# Patient Record
Sex: FEMALE | Race: WHITE | Employment: OTHER | URBAN - METROPOLITAN AREA
[De-identification: names, ages, dates, MRNs, and addresses within clinical notes are randomized per-mention and may not be internally consistent; named-entity substitution may affect disease eponyms.]

---

## 2017-05-23 ENCOUNTER — LAB REQUISITION (OUTPATIENT)
Dept: LAB | Facility: HOSPITAL | Age: 72
End: 2017-05-23
Payer: MEDICARE

## 2017-05-23 DIAGNOSIS — E78.00 PURE HYPERCHOLESTEROLEMIA: ICD-10-CM

## 2017-05-23 DIAGNOSIS — D51.8 OTHER VITAMIN B12 DEFICIENCY ANEMIAS: ICD-10-CM

## 2017-05-23 DIAGNOSIS — E55.9 VITAMIN D DEFICIENCY: ICD-10-CM

## 2017-05-23 DIAGNOSIS — R73.01 IMPAIRED FASTING GLUCOSE: ICD-10-CM

## 2017-05-23 PROCEDURE — 82306 VITAMIN D 25 HYDROXY: CPT | Performed by: INTERNAL MEDICINE

## 2017-05-23 PROCEDURE — 83036 HEMOGLOBIN GLYCOSYLATED A1C: CPT | Performed by: INTERNAL MEDICINE

## 2017-05-23 PROCEDURE — 85025 COMPLETE CBC W/AUTO DIFF WBC: CPT | Performed by: INTERNAL MEDICINE

## 2017-05-23 PROCEDURE — 80061 LIPID PANEL: CPT | Performed by: INTERNAL MEDICINE

## 2017-05-23 PROCEDURE — 84443 ASSAY THYROID STIM HORMONE: CPT | Performed by: INTERNAL MEDICINE

## 2017-05-23 PROCEDURE — 80053 COMPREHEN METABOLIC PANEL: CPT | Performed by: INTERNAL MEDICINE

## 2017-05-23 PROCEDURE — 82607 VITAMIN B-12: CPT | Performed by: INTERNAL MEDICINE

## 2017-05-30 ENCOUNTER — HOSPITAL ENCOUNTER (OUTPATIENT)
Dept: BONE DENSITY | Facility: HOSPITAL | Age: 72
Discharge: HOME | End: 2017-05-30
Attending: INTERNAL MEDICINE
Payer: MEDICARE

## 2017-05-30 DIAGNOSIS — M85.80 OSTEOPENIA: ICD-10-CM

## 2017-05-30 DIAGNOSIS — C50.411 BREAST CANCER OF UPPER-OUTER QUADRANT OF RIGHT FEMALE BREAST (HCC): ICD-10-CM

## 2017-05-30 DIAGNOSIS — Z78.0 ASYMPTOMATIC POSTMENOPAUSAL STATUS: ICD-10-CM

## 2017-05-30 PROCEDURE — 77080 DXA BONE DENSITY AXIAL: CPT | Performed by: INTERNAL MEDICINE

## 2017-06-16 ENCOUNTER — TELEPHONE (OUTPATIENT)
Dept: HEMATOLOGY/ONCOLOGY | Facility: HOSPITAL | Age: 72
End: 2017-06-16

## 2017-06-16 DIAGNOSIS — C50.411 BREAST CANCER OF UPPER-OUTER QUADRANT OF RIGHT FEMALE BREAST (HCC): Primary | ICD-10-CM

## 2017-06-16 NOTE — TELEPHONE ENCOUNTER
Patient needs order for bras faxed to [facility]  Fx# [phone].      It should say  :  prosthetic bras  QTY  6

## 2017-08-30 ENCOUNTER — TELEPHONE (OUTPATIENT)
Dept: HEMATOLOGY/ONCOLOGY | Facility: HOSPITAL | Age: 72
End: 2017-08-30

## 2017-08-30 DIAGNOSIS — C50.411 BREAST CANCER OF UPPER-OUTER QUADRANT OF RIGHT FEMALE BREAST (HCC): Primary | ICD-10-CM

## 2017-08-30 NOTE — TELEPHONE ENCOUNTER
Alberto Bains is asking for a mammogram order to be placed. She will have it in October but she weston like to schedule now.  Thanks

## 2017-09-06 ENCOUNTER — TELEPHONE (OUTPATIENT)
Dept: NEUROLOGY | Facility: CLINIC | Age: 72
End: 2017-09-06

## 2017-09-08 ENCOUNTER — OFFICE VISIT (OUTPATIENT)
Dept: NEUROLOGY | Facility: CLINIC | Age: 72
End: 2017-09-08

## 2017-09-08 DIAGNOSIS — G56.02 CARPAL TUNNEL SYNDROME OF LEFT WRIST: Primary | ICD-10-CM

## 2017-09-08 PROCEDURE — 95886 MUSC TEST DONE W/N TEST COMP: CPT | Performed by: OTHER

## 2017-09-08 PROCEDURE — 95910 NRV CNDJ TEST 7-8 STUDIES: CPT | Performed by: OTHER

## 2017-09-08 NOTE — PROCEDURES
[address]  Phone: [phone]  Fax: [phone] REPORT          Patient: Julissa Alatorre YOB: 1945  Patient ID: [id number] Hand Dominance: EMG–nerve conduction study reviewed marked prolongation of median motor distal latency absent median palmar sensory, absent median antidromic sensory responses and moderate chronic neurogenic motor unit potential changes in left APB muscle.   This is consis

## 2017-09-17 NOTE — H&P
[facility]    PATIENT'S NAME: Alycia Fine   ATTENDING PHYSICIAN: Mark Johnson MD   PATIENT ACCOUNT#:   [id number]    [facility]  MEDICAL RECORD #:   [id number]       YOB: 1945  ADMISSION DATE:       09/18/2017 endoscopies. MEDICATIONS:  Current home medications:  Aspirin 325 mg daily, Singulair 10 mg daily, Aleve 220 mg p.r.n., omeprazole 40 mg daily, Symbicort 160/4.5 inhaled b.i.d., Luvena prebiotic lubricant, VESIcare 5 mg daily.   Supplements:  Vitamin B12 Mark Johnson MD  d: 09/17/2017 11:50:07  t: 09/17/2017 13:36:02  [facility] [id number]  RICHI/

## 2017-09-18 ENCOUNTER — ANESTHESIA (OUTPATIENT)
Dept: SURGERY | Facility: HOSPITAL | Age: 72
End: 2017-09-18
Payer: MEDICARE

## 2017-09-18 ENCOUNTER — ANESTHESIA EVENT (OUTPATIENT)
Dept: SURGERY | Facility: HOSPITAL | Age: 72
End: 2017-09-18
Payer: MEDICARE

## 2017-09-18 ENCOUNTER — HOSPITAL ENCOUNTER (OUTPATIENT)
Facility: HOSPITAL | Age: 72
Setting detail: HOSPITAL OUTPATIENT SURGERY
Discharge: HOME | End: 2017-09-18
Attending: ORTHOPAEDIC SURGERY | Admitting: ORTHOPAEDIC SURGERY
Payer: MEDICARE

## 2017-09-18 ENCOUNTER — SURGERY (OUTPATIENT)
Age: 72
End: 2017-09-18

## 2017-09-18 VITALS
OXYGEN SATURATION: 93 % | BODY MASS INDEX: 44.9 KG/M2 | HEIGHT: 62 IN | TEMPERATURE: 97 F | SYSTOLIC BLOOD PRESSURE: 148 MMHG | DIASTOLIC BLOOD PRESSURE: 67 MMHG | RESPIRATION RATE: 18 BRPM | HEART RATE: 86 BPM | WEIGHT: 244 LBS

## 2017-09-18 DIAGNOSIS — G56.02 LEFT CARPAL TUNNEL SYNDROME: Primary | ICD-10-CM

## 2017-09-18 PROCEDURE — 01N50ZZ RELEASE MEDIAN NERVE, OPEN APPROACH: ICD-10-PCS | Performed by: ORTHOPAEDIC SURGERY

## 2017-09-18 RX ORDER — ONDANSETRON 2 MG/ML
INJECTION INTRAMUSCULAR; INTRAVENOUS AS NEEDED
Status: DISCONTINUED | OUTPATIENT
Start: 2017-09-18 | End: 2017-09-18 | Stop reason: SURG

## 2017-09-18 RX ORDER — MORPHINE SULFATE 2 MG/ML
2 INJECTION, SOLUTION INTRAMUSCULAR; INTRAVENOUS EVERY 10 MIN PRN
Status: DISCONTINUED | OUTPATIENT
Start: 2017-09-18 | End: 2017-09-18

## 2017-09-18 RX ORDER — SODIUM CHLORIDE, SODIUM LACTATE, POTASSIUM CHLORIDE, CALCIUM CHLORIDE 600; 310; 30; 20 MG/100ML; MG/100ML; MG/100ML; MG/100ML
INJECTION, SOLUTION INTRAVENOUS CONTINUOUS
Status: DISCONTINUED | OUTPATIENT
Start: 2017-09-18 | End: 2017-09-18

## 2017-09-18 RX ORDER — NALOXONE HYDROCHLORIDE 0.4 MG/ML
80 INJECTION, SOLUTION INTRAMUSCULAR; INTRAVENOUS; SUBCUTANEOUS AS NEEDED
Status: DISCONTINUED | OUTPATIENT
Start: 2017-09-18 | End: 2017-09-18

## 2017-09-18 RX ORDER — METOCLOPRAMIDE 10 MG/1
10 TABLET ORAL ONCE
Status: COMPLETED | OUTPATIENT
Start: 2017-09-18 | End: 2017-09-18

## 2017-09-18 RX ORDER — GLYCOPYRROLATE 0.2 MG/ML
INJECTION INTRAMUSCULAR; INTRAVENOUS AS NEEDED
Status: DISCONTINUED | OUTPATIENT
Start: 2017-09-18 | End: 2017-09-18 | Stop reason: SURG

## 2017-09-18 RX ORDER — HYDROMORPHONE HYDROCHLORIDE 1 MG/ML
0.6 INJECTION, SOLUTION INTRAMUSCULAR; INTRAVENOUS; SUBCUTANEOUS EVERY 5 MIN PRN
Status: DISCONTINUED | OUTPATIENT
Start: 2017-09-18 | End: 2017-09-18

## 2017-09-18 RX ORDER — HALOPERIDOL 5 MG/ML
0.25 INJECTION INTRAMUSCULAR ONCE AS NEEDED
Status: DISCONTINUED | OUTPATIENT
Start: 2017-09-18 | End: 2017-09-18

## 2017-09-18 RX ORDER — HYDROMORPHONE HYDROCHLORIDE 1 MG/ML
0.4 INJECTION, SOLUTION INTRAMUSCULAR; INTRAVENOUS; SUBCUTANEOUS EVERY 5 MIN PRN
Status: DISCONTINUED | OUTPATIENT
Start: 2017-09-18 | End: 2017-09-18

## 2017-09-18 RX ORDER — HYDROCODONE BITARTRATE AND ACETAMINOPHEN 5; 325 MG/1; MG/1
2 TABLET ORAL AS NEEDED
Status: DISCONTINUED | OUTPATIENT
Start: 2017-09-18 | End: 2017-09-18

## 2017-09-18 RX ORDER — HYDROMORPHONE HYDROCHLORIDE 1 MG/ML
0.2 INJECTION, SOLUTION INTRAMUSCULAR; INTRAVENOUS; SUBCUTANEOUS EVERY 5 MIN PRN
Status: DISCONTINUED | OUTPATIENT
Start: 2017-09-18 | End: 2017-09-18

## 2017-09-18 RX ORDER — MORPHINE SULFATE 10 MG/ML
6 INJECTION, SOLUTION INTRAMUSCULAR; INTRAVENOUS EVERY 10 MIN PRN
Status: DISCONTINUED | OUTPATIENT
Start: 2017-09-18 | End: 2017-09-18

## 2017-09-18 RX ORDER — MORPHINE SULFATE 4 MG/ML
4 INJECTION, SOLUTION INTRAMUSCULAR; INTRAVENOUS EVERY 10 MIN PRN
Status: DISCONTINUED | OUTPATIENT
Start: 2017-09-18 | End: 2017-09-18

## 2017-09-18 RX ORDER — DEXAMETHASONE SODIUM PHOSPHATE 4 MG/ML
VIAL (ML) INJECTION AS NEEDED
Status: DISCONTINUED | OUTPATIENT
Start: 2017-09-18 | End: 2017-09-18 | Stop reason: SURG

## 2017-09-18 RX ORDER — KETOROLAC TROMETHAMINE 30 MG/ML
INJECTION, SOLUTION INTRAMUSCULAR; INTRAVENOUS AS NEEDED
Status: DISCONTINUED | OUTPATIENT
Start: 2017-09-18 | End: 2017-09-18 | Stop reason: SURG

## 2017-09-18 RX ORDER — HYDROCODONE BITARTRATE AND ACETAMINOPHEN 5; 325 MG/1; MG/1
1 TABLET ORAL AS NEEDED
Status: DISCONTINUED | OUTPATIENT
Start: 2017-09-18 | End: 2017-09-18

## 2017-09-18 RX ORDER — LIDOCAINE HYDROCHLORIDE 5 MG/ML
INJECTION, SOLUTION INFILTRATION; INTRAVENOUS AS NEEDED
Status: DISCONTINUED | OUTPATIENT
Start: 2017-09-18 | End: 2017-09-18 | Stop reason: SURG

## 2017-09-18 RX ORDER — CHOLECALCIFEROL (VITAMIN D3) 125 MCG
500 CAPSULE ORAL DAILY
Status: CANCELLED | OUTPATIENT
Start: 2017-09-18

## 2017-09-18 RX ORDER — LIDOCAINE HYDROCHLORIDE 10 MG/ML
INJECTION, SOLUTION EPIDURAL; INFILTRATION; INTRACAUDAL; PERINEURAL AS NEEDED
Status: DISCONTINUED | OUTPATIENT
Start: 2017-09-18 | End: 2017-09-18 | Stop reason: SURG

## 2017-09-18 RX ORDER — MIDAZOLAM HYDROCHLORIDE 1 MG/ML
INJECTION INTRAMUSCULAR; INTRAVENOUS AS NEEDED
Status: DISCONTINUED | OUTPATIENT
Start: 2017-09-18 | End: 2017-09-18 | Stop reason: SURG

## 2017-09-18 RX ORDER — ONDANSETRON 2 MG/ML
4 INJECTION INTRAMUSCULAR; INTRAVENOUS ONCE AS NEEDED
Status: DISCONTINUED | OUTPATIENT
Start: 2017-09-18 | End: 2017-09-18

## 2017-09-18 RX ORDER — FAMOTIDINE 20 MG/1
20 TABLET ORAL ONCE
Status: DISCONTINUED | OUTPATIENT
Start: 2017-09-18 | End: 2017-09-18 | Stop reason: HOSPADM

## 2017-09-18 RX ADMIN — MIDAZOLAM HYDROCHLORIDE 2 MG: 1 INJECTION INTRAMUSCULAR; INTRAVENOUS at 10:48:00

## 2017-09-18 RX ADMIN — SODIUM CHLORIDE, SODIUM LACTATE, POTASSIUM CHLORIDE, CALCIUM CHLORIDE: 600; 310; 30; 20 INJECTION, SOLUTION INTRAVENOUS at 10:48:00

## 2017-09-18 RX ADMIN — DEXAMETHASONE SODIUM PHOSPHATE 4 MG: 4 MG/ML VIAL (ML) INJECTION at 11:19:00

## 2017-09-18 RX ADMIN — ONDANSETRON 4 MG: 2 INJECTION INTRAMUSCULAR; INTRAVENOUS at 11:35:00

## 2017-09-18 RX ADMIN — SODIUM CHLORIDE, SODIUM LACTATE, POTASSIUM CHLORIDE, CALCIUM CHLORIDE: 600; 310; 30; 20 INJECTION, SOLUTION INTRAVENOUS at 11:48:00

## 2017-09-18 RX ADMIN — GLYCOPYRROLATE 0.2 MG: 0.2 INJECTION INTRAMUSCULAR; INTRAVENOUS at 10:48:00

## 2017-09-18 RX ADMIN — LIDOCAINE HYDROCHLORIDE 50 MG: 10 INJECTION, SOLUTION EPIDURAL; INFILTRATION; INTRACAUDAL; PERINEURAL at 10:54:00

## 2017-09-18 RX ADMIN — KETOROLAC TROMETHAMINE 15 MG: 30 INJECTION, SOLUTION INTRAMUSCULAR; INTRAVENOUS at 11:35:00

## 2017-09-18 RX ADMIN — LIDOCAINE HYDROCHLORIDE 50 ML: 5 INJECTION, SOLUTION INFILTRATION; INTRAVENOUS at 11:01:00

## 2017-09-18 NOTE — ANESTHESIA PROCEDURE NOTES
Peripheral Block    Anesthesiologist:  Ken Garg  CRNA:  José Luis Llamas  Performed by:  CRNA and anesthesiologist  Patient Location:  OR  Start Time:  9/18/2017 10:50 AM  End Time:  9/18/2017 11:05 AM  Site Identification: surface landmarks    Hardik

## 2017-09-18 NOTE — ANESTHESIA PREPROCEDURE EVALUATION
Anesthesia PreOp Note    HPI:     Pancho Lemon is a 80year old female who presents for preoperative consultation requested by: Axel Diallo MD    Date of Surgery: 9/18/2017    Procedure(s):  WRIST CARPAL TUNNEL RELEASE  Indication: Left carpal tunne respiratory depressants including alcohol and sedatives   • Visual impairment        Past Surgical History:  1992: CHEMOTHERAPY Right  No date: CHOLECYSTECTOMY  05/25/2005: COLONOSCOPY      Comment: Perales's/constipation;                EGD/colonoscopy;di 9/18/2017 at Unknown time   VESICARE 5 MG Oral Tab Take 5 mg by mouth once daily. Disp:  Rfl: 1 9/18/2017 at Unknown time   Calcium Carbonate-Vitamin D 600-200 MG-UNIT Oral Cap Take  by mouth.  Disp:  Rfl:  9/13/2017   Naproxen Sodium (ALEVE) 220 MG Oral Ta 2\") and weight is 110.7 kg (244 lb). Her oral temperature is 97.9 °F (36.6 °C). Her blood pressure is 156/76 and her pulse is 74. Her respiration is 16 and oxygen saturation is 95%.     09/14/17  1126 09/18/17  0918   BP:  156/76   BP Location:  Left arm

## 2017-09-18 NOTE — BRIEF OP NOTE
[address] UNIT  Brief Op Note     [facility] Location: OR   [facility] [id number] MRN [id number]   Admission Date 9/18/2017 Operation Date 9/18/2017   Attending Physician Minal Guzman MD Operating Physician Ortega Stover MD

## 2017-09-18 NOTE — OPERATIVE REPORT
[facility]    PATIENT'S NAME: Colleen Umesh   ATTENDING PHYSICIAN: Mark Johnson MD   OPERATING PHYSICIAN: Mark Johnson MD   PATIENT ACCOUNT#:   [de-identified]    LOCATION:  [facility] 6 [facility] 10  MEDICAL RECORD #:   [id number]       DATE Buel Sprain was incised. A self-retaining retractor was placed. The transverse carpal ligament was promptly identified.   It was incised with a #15 knife blade to the point of entry into the carpal tunnel followed then by placement of a curved mosquito hemostat throu deep to the distal aspect of the volar forearm fascia shows that it was completely relaxed and was free of any significant compression.     The wound was then closed with 2 vertical mattress sutures of 3-0 nylon material.  The remaining closure was done wit

## 2017-09-18 NOTE — INTERVAL H&P NOTE
Pre-op Diagnosis: Left carpal tunnel syndrome     The above referenced H&P was reviewed by Tr Anderson MD on 9/18/2017, the patient was examined and no significant changes have occurred in the patient's condition since the H&P was performed.   I discussed

## 2017-09-18 NOTE — ANESTHESIA POSTPROCEDURE EVALUATION
Patient: Kavin Andrews    Procedure Summary     Date:  09/18/17 Room / Location:  [address] [place] [address] MAIN OR 11 / [address] [place] [address] MAIN OR    Anesthesia Start:  2634 Anesthesia Stop:  1878    Procedure:  WRIST CARPAL TUNNEL RELEASE (Left Wrist) Diagnosis:  (Left carpal tunnel synd

## 2017-10-19 ENCOUNTER — HOSPITAL ENCOUNTER (OUTPATIENT)
Dept: MAMMOGRAPHY | Facility: HOSPITAL | Age: 72
Discharge: HOME | End: 2017-10-19
Attending: INTERNAL MEDICINE
Payer: MEDICARE

## 2017-10-19 ENCOUNTER — HOSPITAL ENCOUNTER (OUTPATIENT)
Dept: ULTRASOUND IMAGING | Facility: HOSPITAL | Age: 72
Discharge: HOME | End: 2017-10-19
Attending: INTERNAL MEDICINE
Payer: MEDICARE

## 2017-10-19 DIAGNOSIS — C50.411 BREAST CANCER OF UPPER-OUTER QUADRANT OF RIGHT FEMALE BREAST (HCC): ICD-10-CM

## 2017-10-19 PROCEDURE — 77065 DX MAMMO INCL CAD UNI: CPT | Performed by: INTERNAL MEDICINE

## 2017-10-19 PROCEDURE — 76642 ULTRASOUND BREAST LIMITED: CPT | Performed by: INTERNAL MEDICINE

## 2017-10-25 ENCOUNTER — OFFICE VISIT (OUTPATIENT)
Dept: HEMATOLOGY/ONCOLOGY | Facility: HOSPITAL | Age: 72
End: 2017-10-25
Attending: INTERNAL MEDICINE
Payer: MEDICARE

## 2017-10-25 VITALS
HEART RATE: 95 BPM | RESPIRATION RATE: 16 BRPM | DIASTOLIC BLOOD PRESSURE: 50 MMHG | WEIGHT: 241 LBS | TEMPERATURE: 97 F | SYSTOLIC BLOOD PRESSURE: 133 MMHG | HEIGHT: 62 IN | BODY MASS INDEX: 44.35 KG/M2

## 2017-10-25 DIAGNOSIS — Z17.1 MALIGNANT NEOPLASM OF UPPER-OUTER QUADRANT OF RIGHT BREAST IN FEMALE, ESTROGEN RECEPTOR NEGATIVE (HCC): Primary | ICD-10-CM

## 2017-10-25 DIAGNOSIS — M15.9 PRIMARY OSTEOARTHRITIS INVOLVING MULTIPLE JOINTS: ICD-10-CM

## 2017-10-25 DIAGNOSIS — M85.80 OSTEOPENIA, UNSPECIFIED LOCATION: ICD-10-CM

## 2017-10-25 DIAGNOSIS — C50.411 MALIGNANT NEOPLASM OF UPPER-OUTER QUADRANT OF RIGHT BREAST IN FEMALE, ESTROGEN RECEPTOR NEGATIVE (HCC): Primary | ICD-10-CM

## 2017-10-25 PROCEDURE — 99214 OFFICE O/P EST MOD 30 MIN: CPT | Performed by: INTERNAL MEDICINE

## 2017-10-25 PROCEDURE — G0463 HOSPITAL OUTPT CLINIC VISIT: HCPCS | Performed by: INTERNAL MEDICINE

## 2017-10-25 NOTE — PROGRESS NOTES
LELAND Zimmerman is a 71year old female with a history of hormone receptor positive, infiltrating ductal, pT4 N1 M0 right breast cancer in 1992.   Patient was treated with mastectomy and node dissection, CAF x 6 cycles, radiation therapy, and hormo Gastrointestinal: Negative for constipation, diarrhea, nausea and vomiting. Per Dr. David Bennett - mercedes Perales's   Genitourinary: Negative for dysuria and hematuria. Musculoskeletal: Positive for arthralgias and joint swelling.  Negative for neck pain and • Arrhythmia     hx of rapid heartbeat    • Perales's esophagus 04/30/2012   • Perales's esophagus 2007    path consistent; EGD 07/17/2007; [facility]ison   • [facility] 04/30/2012 1992 - [id number]; Mastectomy/Chemo/Radiation. ,right breast   • Cheli 05/09/2012: GENERAL SLEEP STUDY      Comment: obesity, hypersomnolence snoring; sleep study;               obstructive sleep apnea, favorable response to                CPAP 15 cm of water / Vera Minus f/u 6/6/12  1993: [facility] NEEDLE LOCALIZATION W/ SPECIME Eyes: Conjunctivae and EOM are normal. Pupils are equal, round, and reactive to light. No scleral icterus. Neck: Normal range of motion. Neck supple.    Skin lesion removed anterior right neck   Cardiovascular: Normal rate, regular rhythm, normal heart so Patient has complaints of joint pains that do not suggest bone pain secondary to metastatic disease.   Patient received steroid injections in the both knees this week, will seek additional injections as needed in [place] because she is not ready to have TKA Scattered breast density is seen on prior studies. No definite focal mass identified. Parenchymal tissue primarily in   the retroareolar and upper outer quadrant.  There is a subtle subcentimeter area of increased density in the   retroareolar region when c TECHNIQUE:    Measurement of bone mineral density of the lumbar spine and hip was performed on a Hologic dual energy x-ray absorptiometry scanner. FINDINGS:          LEFT FEMORAL NECK               BMD:    0.641 gm/sq. cm.            T SCORE:         -1. 9 Bone mineral content in the lumbar spine is still in the normal range, significantly  increased 11.8% since the prior study. The FRAX calculated 10 year risk of any major osteoporotic fracture is 9.2%, hip fracture 1.5%.         Component <130 mg/dL 137 (H)   LDL Cholesterol Calc      0 - 99 mg/dL 126 (H)   HEMOGLOBIN A1c      4.0 - 6.0 % 5.5   Vitamin B12      181 - 914 pg/mL 860   TSH      0.45 - 5.33 uIU/mL 2.75   Vitamin D, 25OH, Total      ng/mL 35.9

## 2018-02-27 ENCOUNTER — TELEPHONE (OUTPATIENT)
Dept: HEMATOLOGY/ONCOLOGY | Facility: HOSPITAL | Age: 73
End: 2018-02-27

## 2018-02-27 DIAGNOSIS — Z17.1 MALIGNANT NEOPLASM OF UPPER-OUTER QUADRANT OF RIGHT BREAST IN FEMALE, ESTROGEN RECEPTOR NEGATIVE (HCC): Primary | ICD-10-CM

## 2018-02-27 DIAGNOSIS — C50.411 MALIGNANT NEOPLASM OF UPPER-OUTER QUADRANT OF RIGHT BREAST IN FEMALE, ESTROGEN RECEPTOR NEGATIVE (HCC): Primary | ICD-10-CM

## 2018-02-27 DIAGNOSIS — R92.8 FOLLOW-UP EXAMINATION OF ABNORMAL MAMMOGRAM: ICD-10-CM

## 2018-04-20 ENCOUNTER — HOSPITAL ENCOUNTER (OUTPATIENT)
Dept: MAMMOGRAPHY | Facility: HOSPITAL | Age: 73
Discharge: HOME | End: 2018-04-20
Attending: INTERNAL MEDICINE
Payer: MEDICARE

## 2018-04-20 DIAGNOSIS — Z17.1 MALIGNANT NEOPLASM OF UPPER-OUTER QUADRANT OF RIGHT BREAST IN FEMALE, ESTROGEN RECEPTOR NEGATIVE (HCC): ICD-10-CM

## 2018-04-20 DIAGNOSIS — C50.411 MALIGNANT NEOPLASM OF UPPER-OUTER QUADRANT OF RIGHT BREAST IN FEMALE, ESTROGEN RECEPTOR NEGATIVE (HCC): ICD-10-CM

## 2018-04-20 PROCEDURE — 77065 DX MAMMO INCL CAD UNI: CPT | Performed by: INTERNAL MEDICINE

## 2018-05-01 ENCOUNTER — LAB REQUISITION (OUTPATIENT)
Dept: LAB | Facility: HOSPITAL | Age: 73
End: 2018-05-01
Payer: MEDICARE

## 2018-05-01 DIAGNOSIS — R73.01 IMPAIRED FASTING GLUCOSE: ICD-10-CM

## 2018-05-01 DIAGNOSIS — D51.8 OTHER VITAMIN B12 DEFICIENCY ANEMIAS: ICD-10-CM

## 2018-05-01 DIAGNOSIS — Z72.89 OTHER PROBLEMS RELATED TO LIFESTYLE: ICD-10-CM

## 2018-05-01 DIAGNOSIS — E55.9 VITAMIN D DEFICIENCY: ICD-10-CM

## 2018-05-01 DIAGNOSIS — E78.00 PURE HYPERCHOLESTEROLEMIA: ICD-10-CM

## 2018-05-01 PROCEDURE — 86803 HEPATITIS C AB TEST: CPT | Performed by: INTERNAL MEDICINE

## 2018-05-01 PROCEDURE — 80053 COMPREHEN METABOLIC PANEL: CPT | Performed by: INTERNAL MEDICINE

## 2018-05-01 PROCEDURE — 85025 COMPLETE CBC W/AUTO DIFF WBC: CPT | Performed by: INTERNAL MEDICINE

## 2018-05-01 PROCEDURE — 82306 VITAMIN D 25 HYDROXY: CPT | Performed by: INTERNAL MEDICINE

## 2018-05-01 PROCEDURE — 80061 LIPID PANEL: CPT | Performed by: INTERNAL MEDICINE

## 2018-05-01 PROCEDURE — 84443 ASSAY THYROID STIM HORMONE: CPT | Performed by: INTERNAL MEDICINE

## 2018-05-01 PROCEDURE — 83036 HEMOGLOBIN GLYCOSYLATED A1C: CPT | Performed by: INTERNAL MEDICINE

## 2018-05-01 PROCEDURE — 82607 VITAMIN B-12: CPT | Performed by: INTERNAL MEDICINE

## 2018-07-19 ENCOUNTER — TELEPHONE (OUTPATIENT)
Dept: NEUROLOGY | Facility: CLINIC | Age: 73
End: 2018-07-19

## 2018-07-19 ENCOUNTER — PROCEDURE VISIT (OUTPATIENT)
Dept: NEUROLOGY | Facility: CLINIC | Age: 73
End: 2018-07-19
Payer: MEDICARE

## 2018-07-19 VITALS — HEIGHT: 62 IN | WEIGHT: 230 LBS | BODY MASS INDEX: 42.33 KG/M2

## 2018-07-19 DIAGNOSIS — G56.01 CARPAL TUNNEL SYNDROME OF RIGHT WRIST: Primary | ICD-10-CM

## 2018-07-19 PROCEDURE — 95909 NRV CNDJ TST 5-6 STUDIES: CPT | Performed by: OTHER

## 2018-07-19 PROCEDURE — 95886 MUSC TEST DONE W/N TEST COMP: CPT | Performed by: OTHER

## 2018-07-19 NOTE — TELEPHONE ENCOUNTER
Pt came in today for an EMG appointment. She states that she was here to see Dr. Maite Boss. After informing Pt that Dr. Maite Boss is in [address] [facility] today and her appointment was actually for Dr. Souza Chel she became very agitated.  She stated that when she originally

## 2018-07-19 NOTE — PROCEDURES
HISTORY:    Kaila Delacruz is a 71year old female with a complaint of numbness in the right hand, mostly involving the first 3 fingers. Especially at night. She already had history of carpal tunnel syndrome on the left that was operated on.     ERICA Wrist APB 4.79 6.9 100 5.73 Wrist - APB 8        Elbow APB 9.06 6.5 93.4 5.89 Elbow - Wrist 21 4.27 49   R ULNAR - ADM      Wrist ADM 3.02 10.2 100 6.51 Wrist - ADM 8        B. Elbow ADM 5.73 9.0 88.9 6.41 B. Elbow - Wrist 15.5 2.71 57      A. Elbow ADM 7.

## 2018-07-23 RX ORDER — ASPIRIN 81 MG/1
81 TABLET, CHEWABLE ORAL DAILY
Status: ON HOLD | COMMUNITY
End: 2018-08-01

## 2018-07-23 RX ORDER — TOLTERODINE TARTRATE 1 MG/1
40 TABLET, EXTENDED RELEASE ORAL DAILY
Status: ON HOLD | COMMUNITY
End: 2018-08-01 | Stop reason: CLARIF

## 2018-08-01 ENCOUNTER — SURGERY (OUTPATIENT)
Age: 73
End: 2018-08-01

## 2018-08-01 ENCOUNTER — ANESTHESIA EVENT (OUTPATIENT)
Dept: SURGERY | Facility: HOSPITAL | Age: 73
End: 2018-08-01
Payer: MEDICARE

## 2018-08-01 ENCOUNTER — HOSPITAL ENCOUNTER (OUTPATIENT)
Facility: HOSPITAL | Age: 73
Setting detail: HOSPITAL OUTPATIENT SURGERY
Discharge: HOME | End: 2018-08-01
Attending: ORTHOPAEDIC SURGERY | Admitting: ORTHOPAEDIC SURGERY
Payer: MEDICARE

## 2018-08-01 ENCOUNTER — ANESTHESIA (OUTPATIENT)
Dept: SURGERY | Facility: HOSPITAL | Age: 73
End: 2018-08-01
Payer: MEDICARE

## 2018-08-01 VITALS
DIASTOLIC BLOOD PRESSURE: 43 MMHG | SYSTOLIC BLOOD PRESSURE: 138 MMHG | OXYGEN SATURATION: 93 % | HEIGHT: 62 IN | WEIGHT: 243.81 LBS | RESPIRATION RATE: 16 BRPM | TEMPERATURE: 98 F | HEART RATE: 80 BPM | BODY MASS INDEX: 44.87 KG/M2

## 2018-08-01 DIAGNOSIS — G56.01 RIGHT CARPAL TUNNEL SYNDROME: Primary | ICD-10-CM

## 2018-08-01 PROCEDURE — 01N50ZZ RELEASE MEDIAN NERVE, OPEN APPROACH: ICD-10-PCS | Performed by: ORTHOPAEDIC SURGERY

## 2018-08-01 RX ORDER — ACETAMINOPHEN 325 MG/1
650 TABLET ORAL EVERY 4 HOURS PRN
Status: DISCONTINUED | OUTPATIENT
Start: 2018-08-01 | End: 2018-08-01

## 2018-08-01 RX ORDER — LIDOCAINE HYDROCHLORIDE 10 MG/ML
INJECTION, SOLUTION EPIDURAL; INFILTRATION; INTRACAUDAL; PERINEURAL AS NEEDED
Status: DISCONTINUED | OUTPATIENT
Start: 2018-08-01 | End: 2018-08-01 | Stop reason: SURG

## 2018-08-01 RX ORDER — MORPHINE SULFATE 4 MG/ML
4 INJECTION, SOLUTION INTRAMUSCULAR; INTRAVENOUS EVERY 10 MIN PRN
Status: DISCONTINUED | OUTPATIENT
Start: 2018-08-01 | End: 2018-08-01

## 2018-08-01 RX ORDER — SODIUM CHLORIDE, SODIUM LACTATE, POTASSIUM CHLORIDE, CALCIUM CHLORIDE 600; 310; 30; 20 MG/100ML; MG/100ML; MG/100ML; MG/100ML
INJECTION, SOLUTION INTRAVENOUS CONTINUOUS
Status: DISCONTINUED | OUTPATIENT
Start: 2018-08-01 | End: 2018-08-01

## 2018-08-01 RX ORDER — ONDANSETRON 2 MG/ML
4 INJECTION INTRAMUSCULAR; INTRAVENOUS EVERY 6 HOURS PRN
Status: DISCONTINUED | OUTPATIENT
Start: 2018-08-01 | End: 2018-08-01

## 2018-08-01 RX ORDER — MIDAZOLAM HYDROCHLORIDE 1 MG/ML
INJECTION INTRAMUSCULAR; INTRAVENOUS AS NEEDED
Status: DISCONTINUED | OUTPATIENT
Start: 2018-08-01 | End: 2018-08-01 | Stop reason: SURG

## 2018-08-01 RX ORDER — MORPHINE SULFATE 10 MG/ML
6 INJECTION, SOLUTION INTRAMUSCULAR; INTRAVENOUS EVERY 10 MIN PRN
Status: DISCONTINUED | OUTPATIENT
Start: 2018-08-01 | End: 2018-08-01

## 2018-08-01 RX ORDER — METOCLOPRAMIDE 10 MG/1
10 TABLET ORAL ONCE
Status: COMPLETED | OUTPATIENT
Start: 2018-08-01 | End: 2018-08-01

## 2018-08-01 RX ORDER — FAMOTIDINE 20 MG/1
20 TABLET ORAL ONCE
Status: DISCONTINUED | OUTPATIENT
Start: 2018-08-01 | End: 2018-08-01 | Stop reason: HOSPADM

## 2018-08-01 RX ORDER — HALOPERIDOL 5 MG/ML
0.25 INJECTION INTRAMUSCULAR ONCE AS NEEDED
Status: DISCONTINUED | OUTPATIENT
Start: 2018-08-01 | End: 2018-08-01

## 2018-08-01 RX ORDER — MORPHINE SULFATE 4 MG/ML
2 INJECTION, SOLUTION INTRAMUSCULAR; INTRAVENOUS EVERY 10 MIN PRN
Status: DISCONTINUED | OUTPATIENT
Start: 2018-08-01 | End: 2018-08-01

## 2018-08-01 RX ORDER — HYDROCODONE BITARTRATE AND ACETAMINOPHEN 5; 325 MG/1; MG/1
1 TABLET ORAL AS NEEDED
Status: DISCONTINUED | OUTPATIENT
Start: 2018-08-01 | End: 2018-08-01

## 2018-08-01 RX ORDER — HYDROCODONE BITARTRATE AND ACETAMINOPHEN 5; 325 MG/1; MG/1
2 TABLET ORAL AS NEEDED
Status: DISCONTINUED | OUTPATIENT
Start: 2018-08-01 | End: 2018-08-01

## 2018-08-01 RX ORDER — HYDROCODONE BITARTRATE AND ACETAMINOPHEN 5; 325 MG/1; MG/1
2 TABLET ORAL EVERY 4 HOURS PRN
Status: DISCONTINUED | OUTPATIENT
Start: 2018-08-01 | End: 2018-08-01

## 2018-08-01 RX ORDER — HYDROCODONE BITARTRATE AND ACETAMINOPHEN 5; 325 MG/1; MG/1
1 TABLET ORAL EVERY 4 HOURS PRN
Status: DISCONTINUED | OUTPATIENT
Start: 2018-08-01 | End: 2018-08-01

## 2018-08-01 RX ORDER — ONDANSETRON 2 MG/ML
4 INJECTION INTRAMUSCULAR; INTRAVENOUS ONCE AS NEEDED
Status: DISCONTINUED | OUTPATIENT
Start: 2018-08-01 | End: 2018-08-01

## 2018-08-01 RX ORDER — NALOXONE HYDROCHLORIDE 0.4 MG/ML
80 INJECTION, SOLUTION INTRAMUSCULAR; INTRAVENOUS; SUBCUTANEOUS AS NEEDED
Status: DISCONTINUED | OUTPATIENT
Start: 2018-08-01 | End: 2018-08-01

## 2018-08-01 RX ADMIN — MIDAZOLAM HYDROCHLORIDE 2 MG: 1 INJECTION INTRAMUSCULAR; INTRAVENOUS at 07:27:00

## 2018-08-01 RX ADMIN — SODIUM CHLORIDE, SODIUM LACTATE, POTASSIUM CHLORIDE, CALCIUM CHLORIDE: 600; 310; 30; 20 INJECTION, SOLUTION INTRAVENOUS at 07:27:00

## 2018-08-01 RX ADMIN — LIDOCAINE HYDROCHLORIDE 40 MG: 10 INJECTION, SOLUTION EPIDURAL; INFILTRATION; INTRACAUDAL; PERINEURAL at 07:36:00

## 2018-08-01 NOTE — OPERATIVE REPORT
[facility]    PATIENT'S NAME: Cindy Colindres   ATTENDING PHYSICIAN: Mark Johnson MD   OPERATING PHYSICIAN: Mark Johnson MD   PATIENT ACCOUNT#:   [de-identified]    LOCATION:  [address] 10  MEDICAL RECORD #:   [id number]       DATE Lazarus Heys outlined with a skin marker. Incision was made in line with skin markings, carried down through a thin layer of subcutaneous fat tissue. Bleeding points encountered were controlled by electrocautery.   The palmar fascia was divided and gave prompt iden a short-arm wrist splint with Velcro closures was applied. The IV regional anesthetic was reversed as dressings were applied. Time of total tourniquet inflation was 33 minutes. There were no specimens obtained or referred to Pathology.   The estimated

## 2018-08-01 NOTE — ANESTHESIA PROCEDURE NOTES
Peripheral Block    Anesthesiologist:  Estefania Enriquez  Performed by:   Anesthesiologist  Patient Location:  OR  Start Time:  8/1/2018 7:35 AM  End Time:  8/1/2018 7:43 AM  Preanesthetic Checklist: 2 patient identifers, IV checked, risks and benefits discu

## 2018-08-01 NOTE — ANESTHESIA PREPROCEDURE EVALUATION
Anesthesia PreOp Note    HPI:     Peggy Zimmerman is a 71year old female who presents for preoperative consultation requested by: Deandre Le MD    Date of Surgery: 8/1/2018    Procedure(s):  WRIST CARPAL TUNNEL RELEASE  Indication: Right carpal tunne Sleep apnea 06/06/2012    CPAP titration; weight reduction, CPAP 15 cm of water, aboidance of respiratory depressants including alcohol and sedatives   • Visual impairment        Past Surgical History:  No date: [place]: CHEMOTHERAPY Ri every four days, per pt's medication list Disp:  Rfl:  7/28/2018   Omeprazole 40 MG Oral Capsule Delayed Release Take 40 mg by mouth daily. Disp:  Rfl:  8/1/2018 at 0500   aspirin 325 MG Oral Tab Take  by mouth.  Disp:  Rfl:  7/27/2018   Montelukast [place] per week         Comment: 3 drinks daily    Drug use: No    Sexual activity: Not on file     Other Topics Concern   None on file     Social History Narrative   None on file       Available pre-op labs reviewed. Vital Signs:   Body mass index is

## 2018-08-01 NOTE — ANESTHESIA POSTPROCEDURE EVALUATION
Patient: Amaya Mckeon    Procedure Summary     Date:  08/01/18 Room / Location:  [address] [place] [address] MAIN OR 05 / [address] [place] [address] MAIN OR    Anesthesia Start:  7687 Anesthesia Stop:  1837    Procedure:  WRIST CARPAL TUNNEL RELEASE (Right Wrist) Diagnosis:  (Right carpal tunnel sy

## 2018-08-01 NOTE — BRIEF OP NOTE
[address] UNIT  Brief Op Note     Kaila Delacruz Location: OR   CSN [id number] MRN [id number]   Admission Date 8/1/2018 Operation Date 8/1/2018   Attending Physician Rebecca Hendrix MD Operating Physician Geno Jimenes MD

## 2018-08-01 NOTE — H&P
History & Physical Examination    Patient Name: Grecia Wiley  MRN: [id number]  [facility]: [id number]  YOB: 1945    Diagnosis: RIGHT CARPAL TUNNEL SYNDROME    Present Illness: 72 Y/O FEMALE WITH PAINFUL NUMBNESS AND PARESTHESIAS OF THE RIGHT HA mg 20 mg Oral Once   Metoclopramide HCl (REGLAN) tab 10 mg 10 mg Oral Once       Allergies:   Adhesive Tape             Levaquin [Levofloxa*    RASH  Sulfa Antibiotics       FEVER  Morphine                NAUSEA ONLY    Comment:Other reaction(s): \"doesn't COLONOSCOPY      Comment: change in bowel habits / Perales's; EGD                colonoscopy biopsy; diverticula sigmoid colon,                internal hemorrhoids, mild gastritis, histal                hernia with reflux, mild esophagitis, no Any changes noted above.     Leticia Keller  8/1/2018  6:53 AM

## 2018-08-29 ENCOUNTER — HOSPITAL ENCOUNTER (OUTPATIENT)
Dept: GENERAL RADIOLOGY | Age: 73
Discharge: HOME | End: 2018-08-29
Attending: ORTHOPAEDIC SURGERY
Payer: MEDICARE

## 2018-08-29 ENCOUNTER — TELEPHONE (OUTPATIENT)
Dept: HEMATOLOGY/ONCOLOGY | Facility: HOSPITAL | Age: 73
End: 2018-08-29

## 2018-08-29 DIAGNOSIS — M25.569 KNEE PAIN: ICD-10-CM

## 2018-08-29 DIAGNOSIS — R92.8 CATEGORY 3 MAMMOGRAPHY RESULT WITH SHORT FOLLOW-UP INTERVAL SUGGESTED FOR PROBABLY BENIGN FINDING: ICD-10-CM

## 2018-08-29 DIAGNOSIS — C50.411 MALIGNANT NEOPLASM OF UPPER-OUTER QUADRANT OF RIGHT BREAST IN FEMALE, ESTROGEN RECEPTOR NEGATIVE (HCC): Primary | ICD-10-CM

## 2018-08-29 DIAGNOSIS — Z17.1 MALIGNANT NEOPLASM OF UPPER-OUTER QUADRANT OF RIGHT BREAST IN FEMALE, ESTROGEN RECEPTOR NEGATIVE (HCC): Primary | ICD-10-CM

## 2018-08-29 PROCEDURE — 73564 X-RAY EXAM KNEE 4 OR MORE: CPT | Performed by: ORTHOPAEDIC SURGERY

## 2018-08-29 NOTE — TELEPHONE ENCOUNTER
Spoke with Carol Ann Tenorio-- let her know that it was recommended she have diag mammo left breast in 6 mo from mammo on 4/20/18. Order placed. Carol Ann Tenorio verbalized understanding.

## 2018-08-29 NOTE — TELEPHONE ENCOUNTER
Lizet Khan would like to speak with the nurse concerning her mammogram. She needs clarification on when she should get her next mammography.  Lizet Khan can be reached at [phone] Please Advise Thanks

## 2018-09-10 ENCOUNTER — ORDER TRANSCRIPTION (OUTPATIENT)
Dept: SLEEP CENTER | Age: 73
End: 2018-09-10

## 2018-09-10 DIAGNOSIS — G47.33 OSA (OBSTRUCTIVE SLEEP APNEA): Primary | ICD-10-CM

## 2018-09-13 ENCOUNTER — OFFICE VISIT (OUTPATIENT)
Dept: SLEEP CENTER | Age: 73
End: 2018-09-13
Attending: INTERNAL MEDICINE
Payer: MEDICARE

## 2018-09-13 DIAGNOSIS — G47.33 OSA (OBSTRUCTIVE SLEEP APNEA): ICD-10-CM

## 2018-09-13 DIAGNOSIS — Z76.89 SLEEP CONCERN: Primary | ICD-10-CM

## 2018-09-13 PROCEDURE — 95811 POLYSOM 6/>YRS CPAP 4/> PARM: CPT

## 2018-09-17 NOTE — PROCEDURES
[address] [facility]  Accredited by the [facility] of [facility] Medicine ([facility])    PATIENT'S NAME: Onzhang Alcira   ATTENDING PHYSICIAN: Caio Kuhn MD   REFERRING PHYSICIAN: Walter De Luna.  Rashad Kuhn MD   PATIENT ACCOUNT #: [id number] LOCATION: S combined arousal index of 6.3 events per hour. There were no significant periodic limb movements. The lowest desaturation was to 84%.   The average heart rate was 83 beats per minute, and there was no significant bradycardia, asystole, nor atrial fibrilla of this study. Dictated By Chika Bermudez MD  d: 09/16/2018 18:18:19  t: 09/16/2018 19:11:08  [facility] [id number]  [facility]/    cc: Tristian Cm.  Tal Colindres MD

## 2018-10-09 ENCOUNTER — TELEPHONE (OUTPATIENT)
Dept: HEMATOLOGY/ONCOLOGY | Facility: HOSPITAL | Age: 73
End: 2018-10-09

## 2018-10-09 DIAGNOSIS — Z90.10 HISTORY OF MASTECTOMY: Primary | ICD-10-CM

## 2018-10-09 NOTE — TELEPHONE ENCOUNTER
Maria Antonia Deleon asking  prosthesis for right breast and 3 bras. Fax order to [phone] [facility].  alyce

## 2018-10-16 ENCOUNTER — LAB REQUISITION (OUTPATIENT)
Dept: LAB | Facility: HOSPITAL | Age: 73
End: 2018-10-16
Payer: MEDICARE

## 2018-10-16 DIAGNOSIS — R73.01 IMPAIRED FASTING GLUCOSE: ICD-10-CM

## 2018-10-16 DIAGNOSIS — E78.00 PURE HYPERCHOLESTEROLEMIA: ICD-10-CM

## 2018-10-16 PROCEDURE — 84460 ALANINE AMINO (ALT) (SGPT): CPT | Performed by: INTERNAL MEDICINE

## 2018-10-16 PROCEDURE — 80048 BASIC METABOLIC PNL TOTAL CA: CPT | Performed by: INTERNAL MEDICINE

## 2018-10-16 PROCEDURE — 80061 LIPID PANEL: CPT | Performed by: INTERNAL MEDICINE

## 2018-10-22 ENCOUNTER — HOSPITAL ENCOUNTER (OUTPATIENT)
Dept: MAMMOGRAPHY | Facility: HOSPITAL | Age: 73
Discharge: HOME | End: 2018-10-22
Attending: INTERNAL MEDICINE
Payer: MEDICARE

## 2018-10-22 DIAGNOSIS — R92.8 CATEGORY 3 MAMMOGRAPHY RESULT WITH SHORT FOLLOW-UP INTERVAL SUGGESTED FOR PROBABLY BENIGN FINDING: ICD-10-CM

## 2018-10-22 DIAGNOSIS — Z17.1 MALIGNANT NEOPLASM OF UPPER-OUTER QUADRANT OF RIGHT BREAST IN FEMALE, ESTROGEN RECEPTOR NEGATIVE (HCC): ICD-10-CM

## 2018-10-22 DIAGNOSIS — C50.411 MALIGNANT NEOPLASM OF UPPER-OUTER QUADRANT OF RIGHT BREAST IN FEMALE, ESTROGEN RECEPTOR NEGATIVE (HCC): ICD-10-CM

## 2018-10-22 PROCEDURE — 77061 BREAST TOMOSYNTHESIS UNI: CPT | Performed by: INTERNAL MEDICINE

## 2018-10-22 PROCEDURE — 77065 DX MAMMO INCL CAD UNI: CPT | Performed by: INTERNAL MEDICINE

## 2018-10-25 ENCOUNTER — OFFICE VISIT (OUTPATIENT)
Dept: HEMATOLOGY/ONCOLOGY | Facility: HOSPITAL | Age: 73
End: 2018-10-25
Attending: INTERNAL MEDICINE
Payer: MEDICARE

## 2018-10-25 VITALS
HEIGHT: 62 IN | DIASTOLIC BLOOD PRESSURE: 53 MMHG | BODY MASS INDEX: 44.9 KG/M2 | TEMPERATURE: 97 F | SYSTOLIC BLOOD PRESSURE: 120 MMHG | RESPIRATION RATE: 18 BRPM | HEART RATE: 95 BPM | WEIGHT: 244 LBS

## 2018-10-25 DIAGNOSIS — M15.9 PRIMARY OSTEOARTHRITIS INVOLVING MULTIPLE JOINTS: ICD-10-CM

## 2018-10-25 DIAGNOSIS — C50.411 MALIGNANT NEOPLASM OF UPPER-OUTER QUADRANT OF RIGHT BREAST IN FEMALE, ESTROGEN RECEPTOR NEGATIVE (HCC): Primary | ICD-10-CM

## 2018-10-25 DIAGNOSIS — Z17.1 MALIGNANT NEOPLASM OF UPPER-OUTER QUADRANT OF RIGHT BREAST IN FEMALE, ESTROGEN RECEPTOR NEGATIVE (HCC): Primary | ICD-10-CM

## 2018-10-25 DIAGNOSIS — M85.89 OSTEOPENIA OF MULTIPLE SITES: ICD-10-CM

## 2018-10-25 PROCEDURE — 99214 OFFICE O/P EST MOD 30 MIN: CPT | Performed by: INTERNAL MEDICINE

## 2018-11-05 NOTE — PROGRESS NOTES
LELAND Andrews is a 71year old female with a history of hormone receptor positive, infiltrating ductal, pT4 N1 M0 right breast cancer in 1992.   Patient was treated with mastectomy and node dissection, CAF x 6 cycles, radiation therapy, and hor Magnifying glasses - bifocal lens   Respiratory: Negative for cough, chest tightness and shortness of breath. Cardiovascular: Positive for leg swelling. Negative for chest pain and palpitations.    Gastrointestinal: Negative for constipation, diarrh OCUVITE (OCUVITE) Oral Tab Take 1 tablet by mouth daily with breakfast. Disp:  Rfl:      Allergies:     Sulfa Antibiotics       FEVER  Adhesive Tape             Levaquin [Levofloxa*    RASH  Morphine                NAUSEA ONLY    Comment:Other reaction(s): Perales's/constipation; EGD/colonoscopy;diverticulosis, internal hemorrhoids, gastritis, hiatal hernia, Perales's esophagus - continue PPI therapy    • COLONOSCOPY  06/28/2011    change in bowel habits / Perales's; EGD colonoscopy biopsy; diverticula sigm Other Topics      Concerns:        Not on file    Social History Narrative      Not on file    Family History   Problem Relation Age of Onset   • Heart Disease Other         grandfather   • Stroke Other         aunt   • Breast Cancer Sister 66        my ASSESSMENT/PLAN:   Malignant neoplasm of upper-outer quadrant of right breast in female, estrogen receptor negative (hcc)  (primary encounter diagnosis)  Osteopenia of multiple sites  Primary osteoarthritis involving multiple joints     Breast cancer o DESHAWN CHANCE DGTL UNI W CAD LT, 9/18/2015, 14:31. [facility]. [facility], [facility] MERON HOANGTL UNI W CAD LT, 9/11/2014, 13:28. [facility]. [facility], [facility] MERON DGTL UNI W CAD LT, 9/09/2013, 10:50.   [facility], DESHAWN CH[facility]   D The previously seen probably benign asymmetries in the left retroareolar breast are not significantly changed. Recommend continued followup in 12 months with left breast mammography to document 2 years of stability.        BI-RADS CATEGORY:     DIAGNOSTIC C Post menopausal women and men age 48 and older presenting with the following should be considered for treatment:  * A hip or vertebral (clinical or morphometric) fracture  * T score < -2.5 at the femoral neck or spine after appropriate evaluation to exclud Left knee: There is a marked medial and lateral joint compartment narrowing with sclerosis and spurring. This is more pronounced than on the right.  There is fairly marked retropatellar narrowing sclerosis and spurring of   b

## 2019-05-21 ENCOUNTER — LAB REQUISITION (OUTPATIENT)
Dept: LAB | Facility: HOSPITAL | Age: 74
End: 2019-05-21
Payer: MEDICARE

## 2019-05-21 DIAGNOSIS — R73.01 IMPAIRED FASTING GLUCOSE: ICD-10-CM

## 2019-05-21 DIAGNOSIS — E78.00 PURE HYPERCHOLESTEROLEMIA: ICD-10-CM

## 2019-05-21 DIAGNOSIS — E55.9 VITAMIN D DEFICIENCY: ICD-10-CM

## 2019-05-21 DIAGNOSIS — D51.8 OTHER VITAMIN B12 DEFICIENCY ANEMIAS: ICD-10-CM

## 2019-05-21 PROCEDURE — 82306 VITAMIN D 25 HYDROXY: CPT | Performed by: INTERNAL MEDICINE

## 2019-05-21 PROCEDURE — 80053 COMPREHEN METABOLIC PANEL: CPT | Performed by: INTERNAL MEDICINE

## 2019-05-21 PROCEDURE — 85025 COMPLETE CBC W/AUTO DIFF WBC: CPT | Performed by: INTERNAL MEDICINE

## 2019-05-21 PROCEDURE — 83036 HEMOGLOBIN GLYCOSYLATED A1C: CPT | Performed by: INTERNAL MEDICINE

## 2019-05-21 PROCEDURE — 82607 VITAMIN B-12: CPT | Performed by: INTERNAL MEDICINE

## 2019-05-21 PROCEDURE — 84443 ASSAY THYROID STIM HORMONE: CPT | Performed by: INTERNAL MEDICINE

## 2019-05-21 PROCEDURE — 80061 LIPID PANEL: CPT | Performed by: INTERNAL MEDICINE

## 2019-05-28 ENCOUNTER — LAB REQUISITION (OUTPATIENT)
Dept: LAB | Facility: HOSPITAL | Age: 74
End: 2019-05-28
Payer: MEDICARE

## 2019-05-28 DIAGNOSIS — D75.89 OTHER SPECIFIED DISEASES OF BLOOD AND BLOOD-FORMING ORGANS: ICD-10-CM

## 2019-05-28 PROCEDURE — 82746 ASSAY OF FOLIC ACID SERUM: CPT | Performed by: INTERNAL MEDICINE

## 2019-08-22 PROBLEM — G89.29 CHRONIC RIGHT SHOULDER PAIN: Status: ACTIVE | Noted: 2019-08-22

## 2019-08-22 PROBLEM — M75.41 IMPINGEMENT SYNDROME OF RIGHT SHOULDER: Status: ACTIVE | Noted: 2019-08-22

## 2019-08-22 PROBLEM — M25.511 CHRONIC RIGHT SHOULDER PAIN: Status: ACTIVE | Noted: 2019-08-22

## 2019-08-22 PROBLEM — M19.019 ARTHRITIS OF SHOULDER REGION: Status: ACTIVE | Noted: 2019-08-22

## 2019-09-16 PROBLEM — M79.89 SOFT TISSUE MASS: Status: ACTIVE | Noted: 2019-09-16

## 2019-10-10 ENCOUNTER — TELEPHONE (OUTPATIENT)
Dept: HEMATOLOGY/ONCOLOGY | Facility: HOSPITAL | Age: 74
End: 2019-10-10

## 2019-10-10 DIAGNOSIS — N64.59 ABNORMAL BREAST FINDING: ICD-10-CM

## 2019-10-10 DIAGNOSIS — R92.8 CATEGORY 3 MAMMOGRAPHY RESULT WITH SHORT FOLLOW-UP INTERVAL SUGGESTED FOR PROBABLY BENIGN FINDING: Primary | ICD-10-CM

## 2019-10-10 NOTE — TELEPHONE ENCOUNTER
Shirin Sharma called to ask that she get a mammo order put in by Dr. Jonathan Mckinney.  Please advise

## 2019-10-18 ENCOUNTER — LAB REQUISITION (OUTPATIENT)
Dept: LAB | Facility: HOSPITAL | Age: 74
End: 2019-10-18
Payer: MEDICARE

## 2019-10-18 DIAGNOSIS — R73.01 IMPAIRED FASTING GLUCOSE: ICD-10-CM

## 2019-10-18 DIAGNOSIS — E78.00 PURE HYPERCHOLESTEROLEMIA, UNSPECIFIED: ICD-10-CM

## 2019-10-18 PROCEDURE — 84460 ALANINE AMINO (ALT) (SGPT): CPT | Performed by: INTERNAL MEDICINE

## 2019-10-18 PROCEDURE — 80061 LIPID PANEL: CPT | Performed by: INTERNAL MEDICINE

## 2019-10-18 PROCEDURE — 83036 HEMOGLOBIN GLYCOSYLATED A1C: CPT | Performed by: INTERNAL MEDICINE

## 2019-10-18 PROCEDURE — 80048 BASIC METABOLIC PNL TOTAL CA: CPT | Performed by: INTERNAL MEDICINE

## 2019-10-23 ENCOUNTER — HOSPITAL ENCOUNTER (OUTPATIENT)
Dept: MAMMOGRAPHY | Facility: HOSPITAL | Age: 74
Discharge: HOME | End: 2019-10-23
Attending: INTERNAL MEDICINE
Payer: MEDICARE

## 2019-10-23 DIAGNOSIS — R92.8 CATEGORY 3 MAMMOGRAPHY RESULT WITH SHORT FOLLOW-UP INTERVAL SUGGESTED FOR PROBABLY BENIGN FINDING: ICD-10-CM

## 2019-10-23 DIAGNOSIS — N64.59 ABNORMAL BREAST FINDING: ICD-10-CM

## 2019-10-23 PROCEDURE — 77065 DX MAMMO INCL CAD UNI: CPT | Performed by: INTERNAL MEDICINE

## 2019-10-23 PROCEDURE — 77061 BREAST TOMOSYNTHESIS UNI: CPT | Performed by: INTERNAL MEDICINE

## 2019-10-24 ENCOUNTER — OFFICE VISIT (OUTPATIENT)
Dept: HEMATOLOGY/ONCOLOGY | Facility: HOSPITAL | Age: 74
End: 2019-10-24
Attending: INTERNAL MEDICINE
Payer: MEDICARE

## 2019-10-24 VITALS
WEIGHT: 233 LBS | HEIGHT: 62 IN | DIASTOLIC BLOOD PRESSURE: 48 MMHG | HEART RATE: 82 BPM | TEMPERATURE: 97 F | OXYGEN SATURATION: 96 % | BODY MASS INDEX: 42.88 KG/M2 | RESPIRATION RATE: 18 BRPM | SYSTOLIC BLOOD PRESSURE: 127 MMHG

## 2019-10-24 DIAGNOSIS — Z17.1 MALIGNANT NEOPLASM OF UPPER-OUTER QUADRANT OF RIGHT BREAST IN FEMALE, ESTROGEN RECEPTOR NEGATIVE (HCC): Primary | ICD-10-CM

## 2019-10-24 DIAGNOSIS — K21.9 GASTROESOPHAGEAL REFLUX DISEASE WITHOUT ESOPHAGITIS: ICD-10-CM

## 2019-10-24 DIAGNOSIS — M85.89 OSTEOPENIA OF MULTIPLE SITES: ICD-10-CM

## 2019-10-24 DIAGNOSIS — Z90.11 STATUS POST MASTECTOMY, RIGHT: ICD-10-CM

## 2019-10-24 DIAGNOSIS — C50.411 MALIGNANT NEOPLASM OF UPPER-OUTER QUADRANT OF RIGHT BREAST IN FEMALE, ESTROGEN RECEPTOR NEGATIVE (HCC): Primary | ICD-10-CM

## 2019-10-24 PROCEDURE — 99213 OFFICE O/P EST LOW 20 MIN: CPT | Performed by: INTERNAL MEDICINE

## 2019-10-25 ENCOUNTER — TELEPHONE (OUTPATIENT)
Dept: HEMATOLOGY/ONCOLOGY | Facility: HOSPITAL | Age: 74
End: 2019-10-25

## 2019-10-25 DIAGNOSIS — Z78.0 ASYMPTOMATIC MENOPAUSE: ICD-10-CM

## 2019-10-25 DIAGNOSIS — M85.80 OSTEOPENIA DETERMINED BY X-RAY: Primary | ICD-10-CM

## 2019-10-25 NOTE — TELEPHONE ENCOUNTER
Central scheduling called to say that the dexa scan was not passing medical necessity and they need a new order on.  Please advise

## 2019-10-26 PROBLEM — Z90.11 STATUS POST MASTECTOMY, RIGHT: Status: ACTIVE | Noted: 2019-10-26

## 2019-10-26 PROBLEM — M85.80 OSTEOPENIA DETERMINED BY X-RAY: Status: ACTIVE | Noted: 2019-10-26

## 2019-10-26 NOTE — PROGRESS NOTES
HPI   10/24/2019  Maria De Jesus Mcclure is a 68year old female with a history of hormone receptor positive, infiltrating ductal, pT4 N1 M0 right breast cancer in 1992.   Patient was treated with mastectomy and node dissection, CAF x 6 cycles, radiation therap Constitutional: Negative for activity change, appetite change, chills and fever. HENT: Negative for congestion and dental problem. Eyes: Positive for visual disturbance.         Magnifying glasses - bifocal lens   Respiratory: Negative for cough, chest Montelukast Sodium (SINGULAIR) 10 MG Oral Tab, Take  by mouth., Disp: , Rfl:   Calcium Carbonate-Vitamin D 600-200 MG-UNIT Oral Cap, Take  by mouth., Disp: , Rfl:   Naproxen Sodium (ALEVE) 220 MG Oral Tab, Take  by mouth., Disp: , Rfl:   SYMBICORT 160-4.5 CPAP titration; weight reduction, CPAP 15 cm of water, aboidance of respiratory depressants including alcohol and sedatives   • Visual impairment      Past Surgical History:   Procedure Laterality Date   • CARPAL TUNNEL RELEASE     • CHEMOTHERAPY Right 19 Tobacco comment: quit 25 yrs ago    Substance and Sexual Activity      Alcohol use:  Yes        Alcohol/week: 2.5 standard drinks        Types: 3 Standard drinks or equivalent per week        Comment: 3 drinks daily      Drug use: No      Sexual activit Neck: Normal range of motion. Neck supple. Skin lesion removed anterior right neck   Cardiovascular: Normal rate, regular rhythm, normal heart sounds and intact distal pulses.    Pulmonary/Chest: Effort normal and breath sounds normal. No respiratory dist Patient has complaints of joint pains that do not suggest bone pain secondary to metastatic disease.   Patient has received previous steroid injections in the both knees may seek additional injections in [place] because she is not ready to have TKA at this COMPARISON: EMA, X KNEE MN 3 VIEW RT, 9/22/2011, 14:32. INDICATIONS:  Chronic pain bilateral knees, no trauma. TECHNIQUE:     3 views were obtained.      FINDINGS:          BONES:             Right knee: When compared to the earlier study from 2011 ther

## 2019-11-21 ENCOUNTER — HOSPITAL ENCOUNTER (OUTPATIENT)
Dept: BONE DENSITY | Facility: HOSPITAL | Age: 74
Discharge: HOME | End: 2019-11-21
Attending: INTERNAL MEDICINE
Payer: MEDICARE

## 2019-11-21 DIAGNOSIS — Z78.0 ASYMPTOMATIC MENOPAUSE: ICD-10-CM

## 2019-11-21 DIAGNOSIS — M85.80 OSTEOPENIA DETERMINED BY X-RAY: ICD-10-CM

## 2019-11-21 PROCEDURE — 77080 DXA BONE DENSITY AXIAL: CPT | Performed by: INTERNAL MEDICINE

## 2019-12-11 ENCOUNTER — TELEPHONE (OUTPATIENT)
Dept: HEMATOLOGY/ONCOLOGY | Facility: HOSPITAL | Age: 74
End: 2019-12-11

## 2019-12-11 NOTE — TELEPHONE ENCOUNTER
Patient will be leaving to go to [place] Saturday she and her  remain healthy. She will increase her vitamin D by taking an additional thousand units with her Caltrate with D twice a day. She is aware of her bone density study results.   Unfortunat

## 2020-01-17 NOTE — PROGRESS NOTES
Dr. Pace Mask informed about the mastectomy on the right breast with lymph node involvement and start IV on the right The patient is a 20y Male complaining of testicular pain.

## 2020-08-08 ENCOUNTER — HOSPITAL ENCOUNTER (OUTPATIENT)
Dept: MRI IMAGING | Facility: HOSPITAL | Age: 75
Discharge: HOME | End: 2020-08-08
Attending: ORTHOPAEDIC SURGERY
Payer: MEDICARE

## 2020-08-08 DIAGNOSIS — G89.29 CHRONIC RIGHT SHOULDER PAIN: ICD-10-CM

## 2020-08-08 DIAGNOSIS — M75.41 IMPINGEMENT SYNDROME OF RIGHT SHOULDER: ICD-10-CM

## 2020-08-08 DIAGNOSIS — M25.511 CHRONIC RIGHT SHOULDER PAIN: ICD-10-CM

## 2020-08-08 PROCEDURE — 73221 MRI JOINT UPR EXTREM W/O DYE: CPT | Performed by: ORTHOPAEDIC SURGERY

## 2020-08-18 PROBLEM — M75.101 NONTRAUMATIC TEAR OF RIGHT ROTATOR CUFF: Status: ACTIVE | Noted: 2020-08-18

## 2020-09-18 ENCOUNTER — TELEPHONE (OUTPATIENT)
Dept: HEMATOLOGY/ONCOLOGY | Facility: HOSPITAL | Age: 75
End: 2020-09-18

## 2020-09-18 DIAGNOSIS — Z12.31 ENCOUNTER FOR SCREENING MAMMOGRAM FOR MALIGNANT NEOPLASM OF BREAST: Primary | ICD-10-CM

## 2020-10-24 ENCOUNTER — HOSPITAL ENCOUNTER (OUTPATIENT)
Dept: MAMMOGRAPHY | Facility: HOSPITAL | Age: 75
Discharge: HOME | End: 2020-10-24
Attending: INTERNAL MEDICINE
Payer: MEDICARE

## 2020-10-24 DIAGNOSIS — Z12.31 ENCOUNTER FOR SCREENING MAMMOGRAM FOR MALIGNANT NEOPLASM OF BREAST: ICD-10-CM

## 2020-10-24 PROCEDURE — 77063 BREAST TOMOSYNTHESIS BI: CPT | Performed by: INTERNAL MEDICINE

## 2020-10-24 PROCEDURE — 77067 SCR MAMMO BI INCL CAD: CPT | Performed by: INTERNAL MEDICINE

## 2020-10-26 ENCOUNTER — TELEPHONE (OUTPATIENT)
Dept: HEMATOLOGY/ONCOLOGY | Facility: HOSPITAL | Age: 75
End: 2020-10-26

## 2020-10-29 ENCOUNTER — OFFICE VISIT (OUTPATIENT)
Dept: HEMATOLOGY/ONCOLOGY | Facility: HOSPITAL | Age: 75
End: 2020-10-29
Attending: INTERNAL MEDICINE
Payer: MEDICARE

## 2020-10-29 VITALS
DIASTOLIC BLOOD PRESSURE: 57 MMHG | HEIGHT: 62 IN | BODY MASS INDEX: 39.38 KG/M2 | WEIGHT: 214 LBS | RESPIRATION RATE: 16 BRPM | OXYGEN SATURATION: 97 % | SYSTOLIC BLOOD PRESSURE: 123 MMHG | HEART RATE: 90 BPM

## 2020-10-29 DIAGNOSIS — E78.2 MIXED HYPERLIPIDEMIA: ICD-10-CM

## 2020-10-29 DIAGNOSIS — R53.83 FATIGUE, UNSPECIFIED TYPE: ICD-10-CM

## 2020-10-29 DIAGNOSIS — M85.89 OSTEOPENIA OF MULTIPLE SITES: ICD-10-CM

## 2020-10-29 DIAGNOSIS — C50.411 MALIGNANT NEOPLASM OF UPPER-OUTER QUADRANT OF RIGHT BREAST IN FEMALE, ESTROGEN RECEPTOR NEGATIVE (HCC): Primary | ICD-10-CM

## 2020-10-29 DIAGNOSIS — Z17.1 MALIGNANT NEOPLASM OF UPPER-OUTER QUADRANT OF RIGHT BREAST IN FEMALE, ESTROGEN RECEPTOR NEGATIVE (HCC): Primary | ICD-10-CM

## 2020-10-29 DIAGNOSIS — R00.2 PALPITATIONS: ICD-10-CM

## 2020-10-29 DIAGNOSIS — Z90.11 STATUS POST MASTECTOMY, RIGHT: ICD-10-CM

## 2020-10-29 DIAGNOSIS — Z92.21 PERSONAL HISTORY OF ANTINEOPLASTIC CHEMOTHERAPY: ICD-10-CM

## 2020-10-29 DIAGNOSIS — Z51.81 ENCOUNTER FOR THERAPEUTIC DRUG MONITORING: ICD-10-CM

## 2020-10-29 PROCEDURE — 99214 OFFICE O/P EST MOD 30 MIN: CPT | Performed by: INTERNAL MEDICINE

## 2020-10-29 RX ORDER — MULTIVIT-MIN/IRON/FOLIC ACID/K 18-600-40
1 CAPSULE ORAL DAILY
COMMUNITY

## 2020-11-02 ENCOUNTER — TELEPHONE (OUTPATIENT)
Dept: RADIATION ONCOLOGY | Facility: HOSPITAL | Age: 75
End: 2020-11-02

## 2020-11-02 NOTE — TELEPHONE ENCOUNTER
Alberto Bains called. She would like to request an order for an EKG. She would also like to request an order for a new prothesis. Alberto Bains would like that to be sent on to [facility] in [place]. Please call pt once the orders are in the system.

## 2020-11-02 NOTE — TELEPHONE ENCOUNTER
Order placed and sent for right breast prosthesis and bras and faxed to [facility] in [place]. Message sent to Dr Andriy Shore re order for EKG. Awaiting response. Patient advised.

## 2020-11-04 PROBLEM — R53.83 FATIGUE: Status: ACTIVE | Noted: 2020-11-04

## 2020-11-04 PROBLEM — Z51.81 ENCOUNTER FOR THERAPEUTIC DRUG MONITORING: Status: ACTIVE | Noted: 2020-11-04

## 2020-11-04 PROBLEM — Z92.21 PERSONAL HISTORY OF ANTINEOPLASTIC CHEMOTHERAPY: Status: ACTIVE | Noted: 2020-11-04

## 2020-11-04 PROBLEM — R00.2 PALPITATIONS: Status: ACTIVE | Noted: 2020-11-04

## 2020-11-04 NOTE — PROGRESS NOTES
HPI   10/29/2020  Miranda De La Cruz is a 69year old female with a history of hormone receptor positive, infiltrating ductal, pT4 N1 M0 right breast cancer in 1992.   Patient was treated with mastectomy and node dissection, CAF x 6 cycles, radiation therap supraclavicular 4500 cGY         Review of Systems:     Review of Systems   Constitutional: Negative for activity change, appetite change, chills and fever. HENT: Negative for congestion and dental problem. Eyes: Positive for visual disturbance. Oral Cap Take  by mouth. • Naproxen Sodium (ALEVE) 220 MG Oral Tab Take  by mouth. • SYMBICORT 160-4.5 MCG/ACT Inhalation Aerosol Inhale into the lungs 2 (two) times daily as needed.          Allergies:     Sulfa Antibiotics       FEVER  Adhesive Ta Past Surgical History:   Procedure Laterality Date   • CARPAL TUNNEL RELEASE     • CHEMOTHERAPY Right 1992   • CHOLECYSTECTOMY     • COLONOSCOPY  05/25/2005    Perales's/constipation; EGD/colonoscopy;diverticulosis, internal hemorrhoids, gastritis, hia Standard drinks or equivalent per week        Comment: 3 drinks daily      Drug use: No      Sexual activity: Not on file    Lifestyle      Physical activity        Days per week: Not on file        Minutes per session: Not on file      Stress: Not on file normal. No scleral icterus. Neck: Normal range of motion. Neck supple. Skin lesion removed anterior right neck   Cardiovascular: Normal rate, regular rhythm, normal heart sounds and intact distal pulses.    Pulmonary/Chest: Effort normal and breath soun intermittent palpitations and previous chemotherapy with CAF. Patient has complaints of joint pains that do not suggest bone pain secondary to metastatic disease. Bone density survey 6/18/14 revealed osteopenia.   She was advised by Dr. Johnson Garay to ta

## 2020-11-14 ENCOUNTER — APPOINTMENT (OUTPATIENT)
Dept: LAB | Facility: HOSPITAL | Age: 75
End: 2020-11-14
Attending: INTERNAL MEDICINE
Payer: MEDICARE

## 2020-11-14 ENCOUNTER — HOSPITAL ENCOUNTER (OUTPATIENT)
Dept: CV DIAGNOSTICS | Facility: HOSPITAL | Age: 75
Discharge: HOME | End: 2020-11-14
Attending: INTERNAL MEDICINE
Payer: MEDICARE

## 2020-11-14 DIAGNOSIS — Z92.21 PERSONAL HISTORY OF ANTINEOPLASTIC CHEMOTHERAPY: ICD-10-CM

## 2020-11-14 DIAGNOSIS — R00.2 PALPITATIONS: ICD-10-CM

## 2020-11-14 DIAGNOSIS — Z51.81 ENCOUNTER FOR THERAPEUTIC DRUG MONITORING: ICD-10-CM

## 2020-11-14 DIAGNOSIS — R53.83 FATIGUE, UNSPECIFIED TYPE: ICD-10-CM

## 2020-11-14 PROCEDURE — 36415 COLL VENOUS BLD VENIPUNCTURE: CPT

## 2020-11-14 PROCEDURE — 93306 TTE W/DOPPLER COMPLETE: CPT | Performed by: INTERNAL MEDICINE

## 2020-11-14 PROCEDURE — 80053 COMPREHEN METABOLIC PANEL: CPT

## 2020-11-14 PROCEDURE — 84443 ASSAY THYROID STIM HORMONE: CPT

## 2020-11-14 PROCEDURE — 80061 LIPID PANEL: CPT

## 2020-11-14 PROCEDURE — 85025 COMPLETE CBC W/AUTO DIFF WBC: CPT

## 2020-11-14 PROCEDURE — 93010 ELECTROCARDIOGRAM REPORT: CPT | Performed by: INTERNAL MEDICINE

## 2020-11-14 PROCEDURE — 93005 ELECTROCARDIOGRAM TRACING: CPT

## 2021-02-07 ENCOUNTER — IMMUNIZATION (OUTPATIENT)
Dept: LAB | Age: 76
End: 2021-02-07

## 2021-02-07 DIAGNOSIS — Z23 NEED FOR VACCINATION: Primary | ICD-10-CM

## 2021-02-07 PROCEDURE — 0001A COVID 19 PFIZER-BIONTECH: CPT

## 2021-02-07 PROCEDURE — 91300 COVID 19 PFIZER-BIONTECH: CPT

## 2021-02-28 ENCOUNTER — IMMUNIZATION (OUTPATIENT)
Dept: LAB | Age: 76
End: 2021-02-28

## 2021-02-28 DIAGNOSIS — Z23 NEED FOR VACCINATION: Primary | ICD-10-CM

## 2021-02-28 PROCEDURE — 91300 COVID 19 PFIZER-BIONTECH: CPT

## 2021-02-28 PROCEDURE — 0002A COVID 19 PFIZER-BIONTECH: CPT

## 2021-03-03 DIAGNOSIS — Z23 NEED FOR VACCINATION: ICD-10-CM

## 2021-03-08 ENCOUNTER — TELEPHONE (OUTPATIENT)
Dept: HEMATOLOGY/ONCOLOGY | Facility: HOSPITAL | Age: 76
End: 2021-03-08

## 2021-07-06 ENCOUNTER — LAB ENCOUNTER (OUTPATIENT)
Dept: LAB | Facility: HOSPITAL | Age: 76
End: 2021-07-06
Attending: INTERNAL MEDICINE
Payer: MEDICARE

## 2021-07-06 DIAGNOSIS — E78.00 PURE HYPERCHOLESTEROLEMIA: Primary | ICD-10-CM

## 2021-07-06 DIAGNOSIS — D51.8 OTHER VITAMIN B12 DEFICIENCY ANEMIA: ICD-10-CM

## 2021-07-06 DIAGNOSIS — N39.0 URINARY TRACT INFECTION, SITE NOT SPECIFIED: ICD-10-CM

## 2021-07-06 DIAGNOSIS — R73.01 IMPAIRED FASTING GLUCOSE: ICD-10-CM

## 2021-07-06 DIAGNOSIS — E55.9 VITAMIN D DEFICIENCY: ICD-10-CM

## 2021-07-06 LAB
ALBUMIN SERPL-MCNC: 3.6 G/DL (ref 3.4–5)
ALBUMIN/GLOB SERPL: 1.1 {RATIO} (ref 1–2)
ALP LIVER SERPL-CCNC: 77 U/L
ALT SERPL-CCNC: 21 U/L
ANION GAP SERPL CALC-SCNC: 5 MMOL/L (ref 0–18)
AST SERPL-CCNC: 16 U/L (ref 15–37)
BASOPHILS # BLD AUTO: 0.03 X10(3) UL (ref 0–0.2)
BASOPHILS NFR BLD AUTO: 0.5 %
BILIRUB SERPL-MCNC: 0.7 MG/DL (ref 0.1–2)
BILIRUB UR QL: NEGATIVE
BUN BLD-MCNC: 16 MG/DL (ref 7–18)
BUN/CREAT SERPL: 22.5 (ref 10–20)
CALCIUM BLD-MCNC: 9 MG/DL (ref 8.5–10.1)
CHLORIDE SERPL-SCNC: 104 MMOL/L (ref 98–112)
CHOLEST SMN-MCNC: 247 MG/DL (ref ?–200)
CLARITY UR: CLEAR
CO2 SERPL-SCNC: 30 MMOL/L (ref 21–32)
COLOR UR: YELLOW
CREAT BLD-MCNC: 0.71 MG/DL
DEPRECATED RDW RBC AUTO: 56.9 FL (ref 35.1–46.3)
EOSINOPHIL # BLD AUTO: 0.16 X10(3) UL (ref 0–0.7)
EOSINOPHIL NFR BLD AUTO: 2.8 %
ERYTHROCYTE [DISTWIDTH] IN BLOOD BY AUTOMATED COUNT: 14.6 % (ref 11–15)
EST. AVERAGE GLUCOSE BLD GHB EST-MCNC: 111 MG/DL (ref 68–126)
GLOBULIN PLAS-MCNC: 3.4 G/DL (ref 2.8–4.4)
GLUCOSE BLD-MCNC: 87 MG/DL (ref 70–99)
GLUCOSE UR-MCNC: NEGATIVE MG/DL
HBA1C MFR BLD HPLC: 5.5 % (ref ?–5.7)
HCT VFR BLD AUTO: 40.9 %
HDLC SERPL-MCNC: 96 MG/DL (ref 40–59)
HGB BLD-MCNC: 13.2 G/DL
HGB UR QL STRIP.AUTO: NEGATIVE
IMM GRANULOCYTES # BLD AUTO: 0.01 X10(3) UL (ref 0–1)
IMM GRANULOCYTES NFR BLD: 0.2 %
KETONES UR-MCNC: NEGATIVE MG/DL
LDLC SERPL CALC-MCNC: 141 MG/DL (ref ?–100)
LEUKOCYTE ESTERASE UR QL STRIP.AUTO: NEGATIVE
LYMPHOCYTES # BLD AUTO: 0.93 X10(3) UL (ref 1–4)
LYMPHOCYTES NFR BLD AUTO: 16.5 %
M PROTEIN MFR SERPL ELPH: 7 G/DL (ref 6.4–8.2)
MCH RBC QN AUTO: 33.6 PG (ref 26–34)
MCHC RBC AUTO-ENTMCNC: 32.3 G/DL (ref 31–37)
MCV RBC AUTO: 104.1 FL
MONOCYTES # BLD AUTO: 0.55 X10(3) UL (ref 0.1–1)
MONOCYTES NFR BLD AUTO: 9.7 %
NEUTROPHILS # BLD AUTO: 3.97 X10 (3) UL (ref 1.5–7.7)
NEUTROPHILS # BLD AUTO: 3.97 X10(3) UL (ref 1.5–7.7)
NEUTROPHILS NFR BLD AUTO: 70.3 %
NITRITE UR QL STRIP.AUTO: NEGATIVE
NONHDLC SERPL-MCNC: 151 MG/DL (ref ?–130)
OSMOLALITY SERPL CALC.SUM OF ELEC: 289 MOSM/KG (ref 275–295)
PATIENT FASTING Y/N/NP: YES
PATIENT FASTING Y/N/NP: YES
PH UR: 7 [PH] (ref 5–8)
PLATELET # BLD AUTO: 314 10(3)UL (ref 150–450)
POTASSIUM SERPL-SCNC: 4.1 MMOL/L (ref 3.5–5.1)
PROT UR-MCNC: NEGATIVE MG/DL
RBC # BLD AUTO: 3.93 X10(6)UL
SODIUM SERPL-SCNC: 139 MMOL/L (ref 136–145)
SP GR UR STRIP: 1.02 (ref 1–1.03)
TRIGL SERPL-MCNC: 64 MG/DL (ref 30–149)
TSI SER-ACNC: 1.67 MIU/ML (ref 0.36–3.74)
UROBILINOGEN UR STRIP-ACNC: <2
VIT B12 SERPL-MCNC: 881 PG/ML (ref 193–986)
VLDLC SERPL CALC-MCNC: 12 MG/DL (ref 0–30)
WBC # BLD AUTO: 5.7 X10(3) UL (ref 4–11)

## 2021-07-06 PROCEDURE — 84443 ASSAY THYROID STIM HORMONE: CPT

## 2021-07-06 PROCEDURE — 80061 LIPID PANEL: CPT

## 2021-07-06 PROCEDURE — 83036 HEMOGLOBIN GLYCOSYLATED A1C: CPT

## 2021-07-06 PROCEDURE — 82607 VITAMIN B-12: CPT

## 2021-07-06 PROCEDURE — 85025 COMPLETE CBC W/AUTO DIFF WBC: CPT

## 2021-07-06 PROCEDURE — 80053 COMPREHEN METABOLIC PANEL: CPT

## 2021-07-06 PROCEDURE — 81003 URINALYSIS AUTO W/O SCOPE: CPT

## 2021-07-06 PROCEDURE — 82306 VITAMIN D 25 HYDROXY: CPT

## 2021-07-06 PROCEDURE — 36415 COLL VENOUS BLD VENIPUNCTURE: CPT

## 2021-07-07 LAB — 25(OH)D3 SERPL-MCNC: 61.9 NG/ML (ref 30–100)

## 2021-08-17 ENCOUNTER — TELEPHONE (OUTPATIENT)
Dept: HEMATOLOGY/ONCOLOGY | Facility: HOSPITAL | Age: 76
End: 2021-08-17

## 2021-08-17 DIAGNOSIS — Z90.11 STATUS POST MASTECTOMY, RIGHT: Primary | ICD-10-CM

## 2021-08-17 DIAGNOSIS — Z12.31 ENCOUNTER FOR SCREENING MAMMOGRAM FOR MALIGNANT NEOPLASM OF BREAST: ICD-10-CM

## 2021-08-17 NOTE — TELEPHONE ENCOUNTER
Patient needs a prescription written for 3 bras, the prescription should be sent to [facility], phone [phone], fax [phone].  She is eligible for 6, and she has had 3 so far this year according to her records

## 2021-08-17 NOTE — TELEPHONE ENCOUNTER
Patient is leaving for [place] on 9/4/21. Patient requested if Dr. Radha Becker could put the mammogram order in the chart AND please mail the order to her home address so the patient can get the mammogram while [place]. Thank you.

## 2021-08-17 NOTE — TELEPHONE ENCOUNTER
Called patient and entered orders per Dr. Saritha Up. faxed DME- mastectomy bras to [facility] and emailed order for mammogram to patient at her home address.

## 2022-03-24 ENCOUNTER — HOSPITAL ENCOUNTER (OUTPATIENT)
Dept: MAMMOGRAPHY | Facility: HOSPITAL | Age: 77
Discharge: HOME | End: 2022-03-24
Attending: INTERNAL MEDICINE
Payer: MEDICARE

## 2022-03-24 DIAGNOSIS — Z12.31 ENCOUNTER FOR SCREENING MAMMOGRAM FOR MALIGNANT NEOPLASM OF BREAST: ICD-10-CM

## 2022-03-24 DIAGNOSIS — Z90.11 STATUS POST MASTECTOMY, RIGHT: ICD-10-CM

## 2022-03-24 PROCEDURE — 77063 BREAST TOMOSYNTHESIS BI: CPT | Performed by: INTERNAL MEDICINE

## 2022-03-24 PROCEDURE — 77067 SCR MAMMO BI INCL CAD: CPT | Performed by: INTERNAL MEDICINE

## 2022-06-21 ENCOUNTER — OFFICE VISIT (OUTPATIENT)
Dept: HEMATOLOGY/ONCOLOGY | Facility: HOSPITAL | Age: 77
End: 2022-06-21
Attending: INTERNAL MEDICINE
Payer: MEDICARE

## 2022-06-21 VITALS
RESPIRATION RATE: 16 BRPM | TEMPERATURE: 98 F | OXYGEN SATURATION: 96 % | DIASTOLIC BLOOD PRESSURE: 51 MMHG | SYSTOLIC BLOOD PRESSURE: 151 MMHG | WEIGHT: 205 LBS | BODY MASS INDEX: 38.71 KG/M2 | HEIGHT: 61 IN | HEART RATE: 77 BPM

## 2022-06-21 DIAGNOSIS — Z90.11 STATUS POST MASTECTOMY, RIGHT: ICD-10-CM

## 2022-06-21 DIAGNOSIS — Z78.0 ASYMPTOMATIC MENOPAUSE: ICD-10-CM

## 2022-06-21 DIAGNOSIS — Z17.1 MALIGNANT NEOPLASM OF UPPER-OUTER QUADRANT OF RIGHT BREAST IN FEMALE, ESTROGEN RECEPTOR NEGATIVE (HCC): Primary | ICD-10-CM

## 2022-06-21 DIAGNOSIS — C50.411 MALIGNANT NEOPLASM OF UPPER-OUTER QUADRANT OF RIGHT BREAST IN FEMALE, ESTROGEN RECEPTOR NEGATIVE (HCC): Primary | ICD-10-CM

## 2022-06-21 PROBLEM — Z51.81 ENCOUNTER FOR THERAPEUTIC DRUG MONITORING: Status: RESOLVED | Noted: 2020-11-04 | Resolved: 2022-06-21

## 2022-06-21 PROBLEM — R00.2 PALPITATIONS: Status: RESOLVED | Noted: 2020-11-04 | Resolved: 2022-06-21

## 2022-06-21 PROBLEM — Z92.21 PERSONAL HISTORY OF ANTINEOPLASTIC CHEMOTHERAPY: Status: RESOLVED | Noted: 2020-11-04 | Resolved: 2022-06-21

## 2022-06-21 PROCEDURE — 99213 OFFICE O/P EST LOW 20 MIN: CPT | Performed by: INTERNAL MEDICINE

## 2022-07-25 ENCOUNTER — LAB ENCOUNTER (OUTPATIENT)
Dept: LAB | Facility: HOSPITAL | Age: 77
End: 2022-07-25
Attending: INTERNAL MEDICINE
Payer: MEDICARE

## 2022-07-25 DIAGNOSIS — E78.00 PURE HYPERCHOLESTEROLEMIA: Primary | ICD-10-CM

## 2022-07-25 DIAGNOSIS — D51.8 OTHER VITAMIN B12 DEFICIENCY ANEMIA: ICD-10-CM

## 2022-07-25 DIAGNOSIS — R73.01 IMPAIRED FASTING GLUCOSE: ICD-10-CM

## 2022-07-25 DIAGNOSIS — E55.9 VITAMIN D DEFICIENCY: ICD-10-CM

## 2022-07-25 DIAGNOSIS — R35.89 POLYURIA: ICD-10-CM

## 2022-07-25 LAB
ALBUMIN SERPL-MCNC: 3.8 G/DL (ref 3.4–5)
ALBUMIN/GLOB SERPL: 1.1 {RATIO} (ref 1–2)
ALP LIVER SERPL-CCNC: 76 U/L
ALT SERPL-CCNC: 23 U/L
ANION GAP SERPL CALC-SCNC: 5 MMOL/L (ref 0–18)
AST SERPL-CCNC: 17 U/L (ref 15–37)
BASOPHILS # BLD AUTO: 0.04 X10(3) UL (ref 0–0.2)
BASOPHILS NFR BLD AUTO: 0.7 %
BILIRUB SERPL-MCNC: 0.8 MG/DL (ref 0.1–2)
BILIRUB UR QL: NEGATIVE
BUN BLD-MCNC: 15 MG/DL (ref 7–18)
BUN/CREAT SERPL: 20 (ref 10–20)
CALCIUM BLD-MCNC: 9.5 MG/DL (ref 8.5–10.1)
CHLORIDE SERPL-SCNC: 104 MMOL/L (ref 98–112)
CHOLEST SERPL-MCNC: 226 MG/DL (ref ?–200)
CLARITY UR: CLEAR
CO2 SERPL-SCNC: 30 MMOL/L (ref 21–32)
COLOR UR: YELLOW
CREAT BLD-MCNC: 0.75 MG/DL
DEPRECATED RDW RBC AUTO: 53.9 FL (ref 35.1–46.3)
EOSINOPHIL # BLD AUTO: 0.16 X10(3) UL (ref 0–0.7)
EOSINOPHIL NFR BLD AUTO: 2.8 %
ERYTHROCYTE [DISTWIDTH] IN BLOOD BY AUTOMATED COUNT: 14.5 % (ref 11–15)
EST. AVERAGE GLUCOSE BLD GHB EST-MCNC: 105 MG/DL (ref 68–126)
FASTING PATIENT LIPID ANSWER: YES
FASTING STATUS PATIENT QL REPORTED: YES
GLOBULIN PLAS-MCNC: 3.4 G/DL (ref 2.8–4.4)
GLUCOSE BLD-MCNC: 85 MG/DL (ref 70–99)
GLUCOSE UR-MCNC: NEGATIVE MG/DL
HBA1C MFR BLD: 5.3 % (ref ?–5.7)
HCT VFR BLD AUTO: 40.8 %
HDLC SERPL-MCNC: 95 MG/DL (ref 40–59)
HGB BLD-MCNC: 13.1 G/DL
HGB UR QL STRIP.AUTO: NEGATIVE
IMM GRANULOCYTES # BLD AUTO: 0.02 X10(3) UL (ref 0–1)
IMM GRANULOCYTES NFR BLD: 0.4 %
KETONES UR-MCNC: NEGATIVE MG/DL
LDLC SERPL CALC-MCNC: 123 MG/DL (ref ?–100)
LEUKOCYTE ESTERASE UR QL STRIP.AUTO: NEGATIVE
LYMPHOCYTES # BLD AUTO: 1.31 X10(3) UL (ref 1–4)
LYMPHOCYTES NFR BLD AUTO: 23.2 %
MCH RBC QN AUTO: 33 PG (ref 26–34)
MCHC RBC AUTO-ENTMCNC: 32.1 G/DL (ref 31–37)
MCV RBC AUTO: 102.8 FL
MONOCYTES # BLD AUTO: 0.66 X10(3) UL (ref 0.1–1)
MONOCYTES NFR BLD AUTO: 11.7 %
NEUTROPHILS # BLD AUTO: 3.45 X10 (3) UL (ref 1.5–7.7)
NEUTROPHILS # BLD AUTO: 3.45 X10(3) UL (ref 1.5–7.7)
NEUTROPHILS NFR BLD AUTO: 61.2 %
NITRITE UR QL STRIP.AUTO: NEGATIVE
NONHDLC SERPL-MCNC: 131 MG/DL (ref ?–130)
OSMOLALITY SERPL CALC.SUM OF ELEC: 288 MOSM/KG (ref 275–295)
PH UR: 7.5 [PH] (ref 5–8)
PLATELET # BLD AUTO: 311 10(3)UL (ref 150–450)
POTASSIUM SERPL-SCNC: 4.4 MMOL/L (ref 3.5–5.1)
PROT SERPL-MCNC: 7.2 G/DL (ref 6.4–8.2)
PROT UR-MCNC: NEGATIVE MG/DL
RBC # BLD AUTO: 3.97 X10(6)UL
SODIUM SERPL-SCNC: 139 MMOL/L (ref 136–145)
SP GR UR STRIP: 1.01 (ref 1–1.03)
TRIGL SERPL-MCNC: 48 MG/DL (ref 30–149)
TSI SER-ACNC: 1.84 MIU/ML (ref 0.36–3.74)
UROBILINOGEN UR STRIP-ACNC: 0.2
VIT B12 SERPL-MCNC: 1120 PG/ML (ref 193–986)
VIT D+METAB SERPL-MCNC: 64.1 NG/ML (ref 30–100)
VLDLC SERPL CALC-MCNC: 8 MG/DL (ref 0–30)
WBC # BLD AUTO: 5.6 X10(3) UL (ref 4–11)

## 2022-07-25 PROCEDURE — 83036 HEMOGLOBIN GLYCOSYLATED A1C: CPT

## 2022-07-25 PROCEDURE — 81003 URINALYSIS AUTO W/O SCOPE: CPT

## 2022-07-25 PROCEDURE — 80053 COMPREHEN METABOLIC PANEL: CPT

## 2022-07-25 PROCEDURE — 80061 LIPID PANEL: CPT

## 2022-07-25 PROCEDURE — 84443 ASSAY THYROID STIM HORMONE: CPT

## 2022-07-25 PROCEDURE — 82306 VITAMIN D 25 HYDROXY: CPT

## 2022-07-25 PROCEDURE — 85025 COMPLETE CBC W/AUTO DIFF WBC: CPT

## 2022-07-25 PROCEDURE — 82607 VITAMIN B-12: CPT

## 2022-07-25 PROCEDURE — 36415 COLL VENOUS BLD VENIPUNCTURE: CPT

## 2022-09-06 PROBLEM — M25.561 ACUTE PAIN OF RIGHT KNEE: Status: ACTIVE | Noted: 2022-09-06

## 2022-09-07 ENCOUNTER — HOSPITAL ENCOUNTER (OUTPATIENT)
Dept: BONE DENSITY | Age: 77
Discharge: HOME | End: 2022-09-07
Attending: INTERNAL MEDICINE
Payer: MEDICARE

## 2022-09-07 DIAGNOSIS — Z78.0 ASYMPTOMATIC MENOPAUSE: ICD-10-CM

## 2022-09-07 PROCEDURE — 77080 DXA BONE DENSITY AXIAL: CPT | Performed by: INTERNAL MEDICINE

## 2022-09-30 ENCOUNTER — OFFICE VISIT (OUTPATIENT)
Dept: UROLOGY | Facility: HOSPITAL | Age: 77
End: 2022-09-30
Attending: OBSTETRICS & GYNECOLOGY

## 2022-09-30 VITALS — HEIGHT: 62 IN | BODY MASS INDEX: 36.8 KG/M2 | RESPIRATION RATE: 18 BRPM | WEIGHT: 200 LBS

## 2022-09-30 DIAGNOSIS — R35.1 NOCTURIA: ICD-10-CM

## 2022-09-30 DIAGNOSIS — N39.41 URGE URINARY INCONTINENCE: Primary | ICD-10-CM

## 2022-09-30 DIAGNOSIS — N95.2 POSTMENOPAUSAL ATROPHIC VAGINITIS: ICD-10-CM

## 2022-09-30 DIAGNOSIS — N39.3 FEMALE STRESS INCONTINENCE: ICD-10-CM

## 2022-09-30 LAB
BLOOD URINE: NEGATIVE
CONTROL RUN WITHIN 24 HOURS?: YES
LEUKOCYTE ESTERASE URINE: NEGATIVE
NITRITE URINE: NEGATIVE

## 2022-09-30 PROCEDURE — 51701 INSERT BLADDER CATHETER: CPT

## 2022-09-30 PROCEDURE — 99212 OFFICE O/P EST SF 10 MIN: CPT

## 2022-09-30 PROCEDURE — 81002 URINALYSIS NONAUTO W/O SCOPE: CPT | Performed by: OBSTETRICS & GYNECOLOGY

## 2022-09-30 PROCEDURE — 87086 URINE CULTURE/COLONY COUNT: CPT | Performed by: OBSTETRICS & GYNECOLOGY

## 2022-09-30 RX ORDER — ESTRADIOL 0.1 MG/G
CREAM VAGINAL
Qty: 42.5 G | Refills: 3 | Status: SHIPPED | OUTPATIENT
Start: 2022-09-30

## 2022-09-30 RX ORDER — MIRABEGRON 50 MG/1
50 TABLET, FILM COATED, EXTENDED RELEASE ORAL DAILY
Qty: 30 TABLET | Refills: 11 | Status: SHIPPED | OUTPATIENT
Start: 2022-09-30 | End: 2022-10-30

## 2022-09-30 NOTE — PATIENT INSTRUCTIONS
PRETTY [address] [place] [address] PELVIC MEDICINE    Fingertip Application Method for Estrogen Vaginal Cream    1. Wash you hands with soap and water and dry thoroughly. 2.  Squeeze out enough cream from the tube to cover 1/2 of your index finger. (See figure 1)    3. Locate the vaginal opening (See figure 2). Immediately above the vagina is the urethra (a small opening where urine is eliminated from your body). The urethra may not be as easily identified as the vagina because the opening is much smaller, however, use the diagram to determine its approximate location. 4.  Carefully spread the cream onto the external vaginal/urethral area (See figure 2). As the cream is spread, some may be gently inserted into the vagina; however, it is not necessary to push the cream high into your vagina.

## 2022-10-14 ENCOUNTER — HOSPITAL ENCOUNTER (OUTPATIENT)
Dept: GENERAL RADIOLOGY | Age: 77
Discharge: HOME | End: 2022-10-14
Attending: INTERNAL MEDICINE
Payer: MEDICARE

## 2022-10-14 ENCOUNTER — LAB ENCOUNTER (OUTPATIENT)
Dept: LAB | Age: 77
End: 2022-10-14
Attending: INTERNAL MEDICINE
Payer: MEDICARE

## 2022-10-14 DIAGNOSIS — R05.9 COUGH IN ADULT: ICD-10-CM

## 2022-10-14 DIAGNOSIS — Z20.822 SUSPECTED 2019 NOVEL CORONAVIRUS INFECTION: ICD-10-CM

## 2022-10-14 DIAGNOSIS — Z20.822 SUSPECTED 2019 NOVEL CORONAVIRUS INFECTION: Primary | ICD-10-CM

## 2022-10-14 PROCEDURE — 71046 X-RAY EXAM CHEST 2 VIEWS: CPT | Performed by: INTERNAL MEDICINE

## 2022-10-15 LAB — SARS-COV-2 RNA RESP QL NAA+PROBE: NOT DETECTED

## 2022-11-04 ENCOUNTER — OFFICE VISIT (OUTPATIENT)
Dept: OTOLARYNGOLOGY | Facility: CLINIC | Age: 77
End: 2022-11-04
Payer: MEDICARE

## 2022-11-04 VITALS — BODY MASS INDEX: 36.8 KG/M2 | WEIGHT: 200 LBS | HEIGHT: 62 IN

## 2022-11-04 DIAGNOSIS — J35.8 TONSILLAR CYST: Primary | ICD-10-CM

## 2022-11-04 DIAGNOSIS — B37.0 ORAL THRUSH: ICD-10-CM

## 2022-11-04 PROCEDURE — 99203 OFFICE O/P NEW LOW 30 MIN: CPT | Performed by: SPECIALIST

## 2022-11-04 RX ORDER — VITS A,C,E/LUTEIN/MINERALS 300MCG-200
1 TABLET ORAL
COMMUNITY

## 2022-11-04 NOTE — PATIENT INSTRUCTIONS
Yes that you seen your tongue is a prominent circumvallate papillae. You also have an incidental left tonsil cyst which is of no concern. Your oral thrush is resolved. You can restart your Singulair.

## 2022-11-11 ENCOUNTER — VIRTUAL PHONE E/M (OUTPATIENT)
Dept: UROLOGY | Facility: HOSPITAL | Age: 77
End: 2022-11-11
Attending: STUDENT IN AN ORGANIZED HEALTH CARE EDUCATION/TRAINING PROGRAM
Payer: MEDICARE

## 2022-11-11 VITALS — HEIGHT: 61 IN | WEIGHT: 187 LBS | BODY MASS INDEX: 35.3 KG/M2

## 2022-11-11 DIAGNOSIS — N95.2 POSTMENOPAUSAL ATROPHIC VAGINITIS: ICD-10-CM

## 2022-11-11 DIAGNOSIS — R35.1 NOCTURIA: ICD-10-CM

## 2022-11-11 DIAGNOSIS — N39.41 URGE URINARY INCONTINENCE: Primary | ICD-10-CM

## 2023-05-04 ENCOUNTER — TELEPHONE (OUTPATIENT)
Dept: HEMATOLOGY/ONCOLOGY | Facility: HOSPITAL | Age: 78
End: 2023-05-04

## 2023-05-15 ENCOUNTER — VIRTUAL PHONE E/M (OUTPATIENT)
Dept: UROLOGY | Facility: HOSPITAL | Age: 78
End: 2023-05-15
Attending: PHYSICIAN ASSISTANT
Payer: MEDICARE

## 2023-05-15 VITALS — BODY MASS INDEX: 35.3 KG/M2 | HEIGHT: 61 IN | WEIGHT: 187 LBS

## 2023-05-15 DIAGNOSIS — R35.1 NOCTURIA: ICD-10-CM

## 2023-05-15 DIAGNOSIS — N39.41 URGE URINARY INCONTINENCE: Primary | ICD-10-CM

## 2023-05-15 DIAGNOSIS — N95.2 POSTMENOPAUSAL ATROPHIC VAGINITIS: ICD-10-CM

## 2023-06-01 ENCOUNTER — HOSPITAL ENCOUNTER (OUTPATIENT)
Dept: CT IMAGING | Age: 78
Discharge: HOME | End: 2023-06-01
Attending: INTERNAL MEDICINE
Payer: MEDICARE

## 2023-06-01 DIAGNOSIS — R06.09 DOE (DYSPNEA ON EXERTION): ICD-10-CM

## 2023-06-01 DIAGNOSIS — R93.89 ABNORMAL CXR: ICD-10-CM

## 2023-06-01 PROCEDURE — 71250 CT THORAX DX C-: CPT | Performed by: INTERNAL MEDICINE

## 2023-06-03 ENCOUNTER — HOSPITAL ENCOUNTER (OUTPATIENT)
Dept: MAMMOGRAPHY | Facility: HOSPITAL | Age: 78
Discharge: HOME | End: 2023-06-03
Attending: INTERNAL MEDICINE
Payer: MEDICARE

## 2023-06-03 DIAGNOSIS — C50.411 MALIGNANT NEOPLASM OF UPPER-OUTER QUADRANT OF RIGHT BREAST IN FEMALE, ESTROGEN RECEPTOR NEGATIVE (HCC): ICD-10-CM

## 2023-06-03 DIAGNOSIS — Z17.1 MALIGNANT NEOPLASM OF UPPER-OUTER QUADRANT OF RIGHT BREAST IN FEMALE, ESTROGEN RECEPTOR NEGATIVE (HCC): ICD-10-CM

## 2023-06-03 DIAGNOSIS — Z90.11 STATUS POST MASTECTOMY, RIGHT: ICD-10-CM

## 2023-06-03 DIAGNOSIS — Z12.31 SCREENING MAMMOGRAM, ENCOUNTER FOR: ICD-10-CM

## 2023-06-03 DIAGNOSIS — Z85.3 HISTORY OF CANCER OF RIGHT BREAST: ICD-10-CM

## 2023-06-03 PROCEDURE — 77067 SCR MAMMO BI INCL CAD: CPT | Performed by: INTERNAL MEDICINE

## 2023-06-03 PROCEDURE — 77063 BREAST TOMOSYNTHESIS BI: CPT | Performed by: INTERNAL MEDICINE

## 2023-06-15 ENCOUNTER — APPOINTMENT (OUTPATIENT)
Dept: HEMATOLOGY/ONCOLOGY | Facility: HOSPITAL | Age: 78
End: 2023-06-15
Attending: THORACIC SURGERY (CARDIOTHORACIC VASCULAR SURGERY)
Payer: MEDICARE

## 2023-06-15 ENCOUNTER — HOSPITAL ENCOUNTER (OUTPATIENT)
Dept: NUCLEAR MEDICINE | Facility: HOSPITAL | Age: 78
Discharge: HOME | End: 2023-06-15
Attending: INTERNAL MEDICINE
Payer: MEDICARE

## 2023-06-15 DIAGNOSIS — J98.59 MEDIASTINAL MASS: ICD-10-CM

## 2023-06-15 LAB — GLUCOSE BLDC GLUCOMTR-MCNC: 88 MG/DL (ref 70–99)

## 2023-06-15 PROCEDURE — 78815 PET IMAGE W/CT SKULL-THIGH: CPT | Performed by: INTERNAL MEDICINE

## 2023-06-15 PROCEDURE — 82962 GLUCOSE BLOOD TEST: CPT

## 2023-06-16 ENCOUNTER — OFFICE VISIT (OUTPATIENT)
Dept: HEMATOLOGY/ONCOLOGY | Facility: HOSPITAL | Age: 78
End: 2023-06-16
Attending: THORACIC SURGERY (CARDIOTHORACIC VASCULAR SURGERY)
Payer: MEDICARE

## 2023-06-16 VITALS
WEIGHT: 193.81 LBS | OXYGEN SATURATION: 94 % | SYSTOLIC BLOOD PRESSURE: 130 MMHG | TEMPERATURE: 98 F | DIASTOLIC BLOOD PRESSURE: 46 MMHG | HEART RATE: 90 BPM | BODY MASS INDEX: 35.66 KG/M2 | HEIGHT: 61.75 IN | RESPIRATION RATE: 16 BRPM

## 2023-06-16 DIAGNOSIS — J98.59 MEDIASTINAL MASS: Primary | ICD-10-CM

## 2023-06-16 RX ORDER — ALBUTEROL SULFATE 90 UG/1
2 AEROSOL, METERED RESPIRATORY (INHALATION) EVERY 4 HOURS PRN
COMMUNITY
Start: 2023-06-05

## 2023-06-20 ENCOUNTER — OFFICE VISIT (OUTPATIENT)
Dept: HEMATOLOGY/ONCOLOGY | Facility: HOSPITAL | Age: 78
End: 2023-06-20
Attending: INTERNAL MEDICINE
Payer: MEDICARE

## 2023-06-20 VITALS
HEIGHT: 61.73 IN | RESPIRATION RATE: 16 BRPM | TEMPERATURE: 98 F | DIASTOLIC BLOOD PRESSURE: 47 MMHG | WEIGHT: 197.88 LBS | BODY MASS INDEX: 36.42 KG/M2 | OXYGEN SATURATION: 94 % | HEART RATE: 71 BPM | SYSTOLIC BLOOD PRESSURE: 147 MMHG

## 2023-06-20 DIAGNOSIS — J98.59 MEDIASTINAL MASS: ICD-10-CM

## 2023-06-20 DIAGNOSIS — Z17.1 MALIGNANT NEOPLASM OF UPPER-OUTER QUADRANT OF RIGHT BREAST IN FEMALE, ESTROGEN RECEPTOR NEGATIVE (HCC): Primary | ICD-10-CM

## 2023-06-20 DIAGNOSIS — Z12.31 ENCOUNTER FOR SCREENING MAMMOGRAM FOR MALIGNANT NEOPLASM OF BREAST: ICD-10-CM

## 2023-06-20 DIAGNOSIS — Z90.11 STATUS POST MASTECTOMY, RIGHT: ICD-10-CM

## 2023-06-20 DIAGNOSIS — C50.411 MALIGNANT NEOPLASM OF UPPER-OUTER QUADRANT OF RIGHT BREAST IN FEMALE, ESTROGEN RECEPTOR NEGATIVE (HCC): Primary | ICD-10-CM

## 2023-06-20 PROCEDURE — 99214 OFFICE O/P EST MOD 30 MIN: CPT | Performed by: INTERNAL MEDICINE

## 2023-06-30 ENCOUNTER — OFFICE VISIT (OUTPATIENT)
Dept: UROLOGY | Facility: HOSPITAL | Age: 78
End: 2023-06-30
Attending: OBSTETRICS & GYNECOLOGY
Payer: MEDICARE

## 2023-06-30 VITALS
BODY MASS INDEX: 35.51 KG/M2 | HEIGHT: 61.75 IN | DIASTOLIC BLOOD PRESSURE: 86 MMHG | SYSTOLIC BLOOD PRESSURE: 136 MMHG | WEIGHT: 193 LBS | RESPIRATION RATE: 16 BRPM

## 2023-06-30 DIAGNOSIS — N39.41 URGE URINARY INCONTINENCE: Primary | ICD-10-CM

## 2023-06-30 DIAGNOSIS — N95.2 POSTMENOPAUSAL ATROPHIC VAGINITIS: ICD-10-CM

## 2023-06-30 DIAGNOSIS — R35.1 NOCTURIA: ICD-10-CM

## 2023-06-30 PROCEDURE — 99212 OFFICE O/P EST SF 10 MIN: CPT

## 2023-06-30 RX ORDER — FLUTICASONE FUROATE, UMECLIDINIUM BROMIDE AND VILANTEROL TRIFENATATE 100; 62.5; 25 UG/1; UG/1; UG/1
1 POWDER RESPIRATORY (INHALATION) DAILY
COMMUNITY
Start: 2023-06-22

## 2023-08-21 ENCOUNTER — LAB ENCOUNTER (OUTPATIENT)
Dept: LAB | Age: 78
End: 2023-08-21
Attending: INTERNAL MEDICINE
Payer: MEDICARE

## 2023-08-21 DIAGNOSIS — E55.9 VITAMIN D DEFICIENCY: ICD-10-CM

## 2023-08-21 DIAGNOSIS — E78.00 PURE HYPERCHOLESTEROLEMIA: Primary | ICD-10-CM

## 2023-08-21 DIAGNOSIS — R35.89 POLYURIA: ICD-10-CM

## 2023-08-21 LAB
ALBUMIN SERPL-MCNC: 3.6 G/DL (ref 3.4–5)
ALBUMIN/GLOB SERPL: 1.2 {RATIO} (ref 1–2)
ALP LIVER SERPL-CCNC: 67 U/L
ALT SERPL-CCNC: 18 U/L
ANION GAP SERPL CALC-SCNC: 7 MMOL/L (ref 0–18)
AST SERPL-CCNC: 21 U/L (ref 15–37)
BASOPHILS # BLD AUTO: 0.04 X10(3) UL (ref 0–0.2)
BASOPHILS NFR BLD AUTO: 0.9 %
BILIRUB SERPL-MCNC: 0.5 MG/DL (ref 0.1–2)
BILIRUB UR QL: NEGATIVE
BUN BLD-MCNC: 14 MG/DL (ref 7–18)
BUN/CREAT SERPL: 17.7 (ref 10–20)
CALCIUM BLD-MCNC: 9.4 MG/DL (ref 8.5–10.1)
CHLORIDE SERPL-SCNC: 105 MMOL/L (ref 98–112)
CHOLEST SERPL-MCNC: 196 MG/DL (ref ?–200)
CLARITY UR: CLEAR
CO2 SERPL-SCNC: 28 MMOL/L (ref 21–32)
CREAT BLD-MCNC: 0.79 MG/DL
DEPRECATED RDW RBC AUTO: 56.4 FL (ref 35.1–46.3)
EGFRCR SERPLBLD CKD-EPI 2021: 77 ML/MIN/1.73M2 (ref 60–?)
EOSINOPHIL # BLD AUTO: 0.13 X10(3) UL (ref 0–0.7)
EOSINOPHIL NFR BLD AUTO: 2.8 %
ERYTHROCYTE [DISTWIDTH] IN BLOOD BY AUTOMATED COUNT: 15.1 % (ref 11–15)
FASTING PATIENT LIPID ANSWER: YES
FASTING STATUS PATIENT QL REPORTED: YES
GLOBULIN PLAS-MCNC: 3.1 G/DL (ref 2.8–4.4)
GLUCOSE BLD-MCNC: 85 MG/DL (ref 70–99)
GLUCOSE UR-MCNC: NORMAL MG/DL
HCT VFR BLD AUTO: 38 %
HDLC SERPL-MCNC: 85 MG/DL (ref 40–59)
HGB BLD-MCNC: 12.4 G/DL
HGB UR QL STRIP.AUTO: NEGATIVE
IMM GRANULOCYTES # BLD AUTO: 0.01 X10(3) UL (ref 0–1)
IMM GRANULOCYTES NFR BLD: 0.2 %
KETONES UR-MCNC: NEGATIVE MG/DL
LDLC SERPL CALC-MCNC: 102 MG/DL (ref ?–100)
LEUKOCYTE ESTERASE UR QL STRIP.AUTO: NEGATIVE
LYMPHOCYTES # BLD AUTO: 0.85 X10(3) UL (ref 1–4)
LYMPHOCYTES NFR BLD AUTO: 18.3 %
MCH RBC QN AUTO: 33.2 PG (ref 26–34)
MCHC RBC AUTO-ENTMCNC: 32.6 G/DL (ref 31–37)
MCV RBC AUTO: 101.6 FL
MONOCYTES # BLD AUTO: 0.6 X10(3) UL (ref 0.1–1)
MONOCYTES NFR BLD AUTO: 12.9 %
NEUTROPHILS # BLD AUTO: 3.02 X10 (3) UL (ref 1.5–7.7)
NEUTROPHILS # BLD AUTO: 3.02 X10(3) UL (ref 1.5–7.7)
NEUTROPHILS NFR BLD AUTO: 64.9 %
NITRITE UR QL STRIP.AUTO: NEGATIVE
NONHDLC SERPL-MCNC: 111 MG/DL (ref ?–130)
OSMOLALITY SERPL CALC.SUM OF ELEC: 290 MOSM/KG (ref 275–295)
PH UR: 7 [PH] (ref 5–8)
PLATELET # BLD AUTO: 297 10(3)UL (ref 150–450)
POTASSIUM SERPL-SCNC: 4.5 MMOL/L (ref 3.5–5.1)
PROT SERPL-MCNC: 6.7 G/DL (ref 6.4–8.2)
PROT UR-MCNC: NEGATIVE MG/DL
RBC # BLD AUTO: 3.74 X10(6)UL
SODIUM SERPL-SCNC: 140 MMOL/L (ref 136–145)
SP GR UR STRIP: 1.02 (ref 1–1.03)
TRIGL SERPL-MCNC: 48 MG/DL (ref 30–149)
TSI SER-ACNC: 1.55 MIU/ML (ref 0.36–3.74)
UROBILINOGEN UR STRIP-ACNC: NORMAL
VIT D+METAB SERPL-MCNC: 68.3 NG/ML (ref 30–100)
VLDLC SERPL CALC-MCNC: 8 MG/DL (ref 0–30)
WBC # BLD AUTO: 4.7 X10(3) UL (ref 4–11)

## 2023-08-21 PROCEDURE — 80061 LIPID PANEL: CPT

## 2023-08-21 PROCEDURE — 81003 URINALYSIS AUTO W/O SCOPE: CPT

## 2023-08-21 PROCEDURE — 84443 ASSAY THYROID STIM HORMONE: CPT

## 2023-08-21 PROCEDURE — 36415 COLL VENOUS BLD VENIPUNCTURE: CPT

## 2023-08-21 PROCEDURE — 80053 COMPREHEN METABOLIC PANEL: CPT

## 2023-08-21 PROCEDURE — 82306 VITAMIN D 25 HYDROXY: CPT

## 2023-08-21 PROCEDURE — 85025 COMPLETE CBC W/AUTO DIFF WBC: CPT

## 2023-09-11 ENCOUNTER — VIRTUAL PHONE E/M (OUTPATIENT)
Dept: UROLOGY | Facility: HOSPITAL | Age: 78
End: 2023-09-11
Attending: PHYSICIAN ASSISTANT
Payer: MEDICARE

## 2023-09-11 VITALS — WEIGHT: 189 LBS | HEIGHT: 61 IN | BODY MASS INDEX: 35.68 KG/M2

## 2023-09-11 DIAGNOSIS — N95.2 POSTMENOPAUSAL ATROPHIC VAGINITIS: ICD-10-CM

## 2023-09-11 DIAGNOSIS — R35.1 NOCTURIA: ICD-10-CM

## 2023-09-11 DIAGNOSIS — N39.41 URGE URINARY INCONTINENCE: Primary | ICD-10-CM

## 2023-09-11 RX ORDER — VIBEGRON 75 MG/1
75 TABLET, FILM COATED ORAL DAILY
Qty: 90 TABLET | Refills: 3 | Status: SHIPPED | OUTPATIENT
Start: 2023-09-11 | End: 2023-10-11

## 2023-09-22 ENCOUNTER — HOSPITAL ENCOUNTER (OUTPATIENT)
Dept: CT IMAGING | Age: 78
Discharge: HOME | End: 2023-09-22
Attending: STUDENT IN AN ORGANIZED HEALTH CARE EDUCATION/TRAINING PROGRAM
Payer: MEDICARE

## 2023-09-22 DIAGNOSIS — J98.59 MEDIASTINAL MASS: ICD-10-CM

## 2023-09-22 LAB
CREAT BLD-MCNC: 0.7 MG/DL
EGFRCR SERPLBLD CKD-EPI 2021: 90 ML/MIN/1.73M2 (ref 60–?)

## 2023-09-22 PROCEDURE — 71260 CT THORAX DX C+: CPT | Performed by: STUDENT IN AN ORGANIZED HEALTH CARE EDUCATION/TRAINING PROGRAM

## 2023-09-22 PROCEDURE — 82565 ASSAY OF CREATININE: CPT

## 2023-09-28 ENCOUNTER — OFFICE VISIT (OUTPATIENT)
Dept: HEMATOLOGY/ONCOLOGY | Facility: HOSPITAL | Age: 78
End: 2023-09-28
Attending: THORACIC SURGERY (CARDIOTHORACIC VASCULAR SURGERY)
Payer: MEDICARE

## 2023-09-28 VITALS
RESPIRATION RATE: 20 BRPM | SYSTOLIC BLOOD PRESSURE: 143 MMHG | HEIGHT: 61.73 IN | TEMPERATURE: 98 F | OXYGEN SATURATION: 92 % | WEIGHT: 196.38 LBS | DIASTOLIC BLOOD PRESSURE: 51 MMHG | BODY MASS INDEX: 36.14 KG/M2 | HEART RATE: 82 BPM

## 2023-09-28 DIAGNOSIS — J98.59 MEDIASTINAL MASS: Primary | ICD-10-CM

## 2023-09-28 RX ORDER — MIRABEGRON 8 MG/8ML
GRANULE, FOR SUSPENSION, EXTENDED RELEASE ORAL
COMMUNITY

## 2023-11-27 ENCOUNTER — VIRTUAL PHONE E/M (OUTPATIENT)
Dept: UROLOGY | Facility: HOSPITAL | Age: 78
End: 2023-11-27
Attending: PHYSICIAN ASSISTANT
Payer: MEDICARE

## 2023-11-27 VITALS — WEIGHT: 192 LBS | HEIGHT: 62 IN | BODY MASS INDEX: 35.33 KG/M2

## 2023-11-27 DIAGNOSIS — N95.2 POSTMENOPAUSAL ATROPHIC VAGINITIS: ICD-10-CM

## 2023-11-27 DIAGNOSIS — R35.1 NOCTURIA: ICD-10-CM

## 2023-11-27 DIAGNOSIS — N39.41 URGE URINARY INCONTINENCE: Primary | ICD-10-CM

## 2023-11-30 ENCOUNTER — TELEPHONE (OUTPATIENT)
Dept: HEMATOLOGY/ONCOLOGY | Facility: HOSPITAL | Age: 78
End: 2023-11-30

## 2024-01-03 ENCOUNTER — TELEPHONE (OUTPATIENT)
Dept: UROLOGY | Facility: HOSPITAL | Age: 79
End: 2024-01-03

## 2024-01-03 RX ORDER — ESTRADIOL 0.1 MG/G
CREAM VAGINAL
Qty: 42.5 G | Refills: 3 | Status: SHIPPED | OUTPATIENT
Start: 2024-01-03

## 2024-01-03 NOTE — TELEPHONE ENCOUNTER
Incoming TC from patient requesting refill on her Estrace cream. Patient seen in our clinic multiple times within the past year. Discussed with EDUAR Segura Estrace vaginal cream, 42.5g x 3 refills. Sent to patient's preferred pharmacy, GoodRx coupon added within electronic order. Encouraged patient to call clinic with additional questions or concerns.

## 2024-01-31 ENCOUNTER — ORDER TRANSCRIPTION (OUTPATIENT)
Dept: ADMINISTRATIVE | Facility: HOSPITAL | Age: 79
End: 2024-01-31

## 2024-01-31 DIAGNOSIS — Z12.31 ENCOUNTER FOR SCREENING MAMMOGRAM FOR MALIGNANT NEOPLASM OF BREAST: Primary | ICD-10-CM

## 2024-02-02 DIAGNOSIS — Z12.31 ENCOUNTER FOR SCREENING MAMMOGRAM FOR MALIGNANT NEOPLASM OF BREAST: Primary | ICD-10-CM

## 2024-06-27 ENCOUNTER — APPOINTMENT (OUTPATIENT)
Dept: HEMATOLOGY/ONCOLOGY | Facility: HOSPITAL | Age: 79
End: 2024-06-27
Attending: PHYSICIAN ASSISTANT
Payer: MEDICARE

## 2024-07-17 ENCOUNTER — HOSPITAL ENCOUNTER (OUTPATIENT)
Dept: MAMMOGRAPHY | Facility: HOSPITAL | Age: 79
Discharge: HOME | End: 2024-07-17
Attending: INTERNAL MEDICINE
Payer: MEDICARE

## 2024-07-17 DIAGNOSIS — Z12.31 ENCOUNTER FOR SCREENING MAMMOGRAM FOR MALIGNANT NEOPLASM OF BREAST: ICD-10-CM

## 2024-07-17 PROCEDURE — 77063 BREAST TOMOSYNTHESIS BI: CPT | Performed by: INTERNAL MEDICINE

## 2024-07-17 PROCEDURE — 77067 SCR MAMMO BI INCL CAD: CPT | Performed by: INTERNAL MEDICINE

## 2024-07-30 ENCOUNTER — OFFICE VISIT (OUTPATIENT)
Dept: HEMATOLOGY/ONCOLOGY | Facility: HOSPITAL | Age: 79
End: 2024-07-30
Attending: INTERNAL MEDICINE
Payer: MEDICARE

## 2024-07-30 VITALS
HEART RATE: 88 BPM | TEMPERATURE: 98 F | DIASTOLIC BLOOD PRESSURE: 50 MMHG | HEIGHT: 61 IN | SYSTOLIC BLOOD PRESSURE: 137 MMHG | RESPIRATION RATE: 18 BRPM | BODY MASS INDEX: 36.06 KG/M2 | WEIGHT: 191 LBS | OXYGEN SATURATION: 93 %

## 2024-07-30 DIAGNOSIS — C50.411 MALIGNANT NEOPLASM OF UPPER-OUTER QUADRANT OF RIGHT BREAST IN FEMALE, ESTROGEN RECEPTOR NEGATIVE (HCC): Primary | ICD-10-CM

## 2024-07-30 DIAGNOSIS — J98.59 MEDIASTINAL MASS: ICD-10-CM

## 2024-07-30 DIAGNOSIS — Z12.31 SCREENING MAMMOGRAM FOR BREAST CANCER: ICD-10-CM

## 2024-07-30 DIAGNOSIS — Z90.11 STATUS POST MASTECTOMY, RIGHT: ICD-10-CM

## 2024-07-30 DIAGNOSIS — Z17.1 MALIGNANT NEOPLASM OF UPPER-OUTER QUADRANT OF RIGHT BREAST IN FEMALE, ESTROGEN RECEPTOR NEGATIVE (HCC): Primary | ICD-10-CM

## 2024-07-30 PROCEDURE — 99214 OFFICE O/P EST MOD 30 MIN: CPT | Performed by: INTERNAL MEDICINE

## 2024-07-30 RX ORDER — AZITHROMYCIN 250 MG/1
250 TABLET, FILM COATED ORAL DAILY
Status: ON HOLD | COMMUNITY

## 2024-07-30 RX ORDER — VIBEGRON 75 MG/1
1 TABLET, FILM COATED ORAL DAILY
Status: ON HOLD | COMMUNITY

## 2024-07-30 RX ORDER — FLUTICASONE PROPIONATE 50 MCG
2 SPRAY, SUSPENSION (ML) NASAL DAILY
Status: ON HOLD | COMMUNITY
Start: 2024-07-15

## 2024-07-30 NOTE — PROGRESS NOTES
Hematology Oncology Progress Note    Patient Name: Annmarie Levin   YOB: 1945   Medical Record Number: [id number]     Date for Visit: 7/30/2024     Chief Complaint:  Breast cancer    Oncologic History:  78 year old with right IDC [id number] (4/11) s/p mastectomy/ALD in 1992 CAF x 6 cycles, radiation therapy, and ? hormonal therapy (Er status unclear per report)    Interval History:  Here for follow up and review imaging. Feeling well. No acute issues or complaints.   No breast or chest wall changes. No new lumps or bumps.     PMH/PSH: Breast cancer, Perales's esophagus, hyperlipidemia, GERD, osteoarthritis, osteopenia, skin cancers, sleep apnea on CPAP  Family History: Paternal cousin breast cancer 78 and sister with breast cancer 78  Social History:  passed away last year    Current Outpatient Medications:     GEMTESA 75 MG Oral Tab, Take 1 tablet by mouth daily., Disp: , Rfl:     azithromycin 250 MG Oral Tab, Take 1 tablet (250 mg total) by mouth daily., Disp: , Rfl:     fluticasone propionate 50 MCG/ACT Nasal Suspension, 2 sprays by Nasal route daily., Disp: , Rfl:     SYMBICORT 160-4.5 MCG/ACT Inhalation Aerosol, Inhale 2 puffs into the lungs 2 (two) times daily., Disp: , Rfl:     desonide 0.05 % External Cream, APPLY TO SCALY AREAS ON FACE TWICE A DAY UNTIL CLEAR *NOT COVERED PER INSURANCE, Disp: , Rfl:     albuterol 108 (90 Base) MCG/ACT Inhalation Aero Soln, Inhale 2 puffs into the lungs every 4 (four) hours as needed., Disp: , Rfl:     PATIENT SUPPLIED MEDICATION, 2L of O2 as needed, Disp: , Rfl:     multivitamin Oral Tab, Take 1 tablet by mouth daily with breakfast., Disp: , Rfl:     estradiol (ESTRACE) 0.1 MG/GM Vaginal Cream, Apply 1/2 gram vaginally 2 times per week. Use at bedtime., Disp: 42.5 g, Rfl: 3    Cholecalciferol (VITAMIN D) 50 MCG (2000 UT) Oral Cap, Take 1 capsule (2,000 Units total) by mouth daily., Disp: , Rfl:     Vitamin B-12 (VITAMIN B12) 500 MCG Oral Tab, Take 1  tablet (500 mcg total) by mouth daily., Disp: , Rfl:     Multiple Vitamins-Minerals (MULTI FOR HER 50+) Oral Tab, Take  by mouth daily., Disp: , Rfl:     Vaginal Moisturizer (LUVENA PREBIOTIC LUBRICANT VA), Place  vaginally. Indications: One every four days, per pt's medication list, Disp: , Rfl:     Omeprazole 40 MG Oral Capsule Delayed Release, Take 1 capsule (40 mg total) by mouth daily., Disp: , Rfl:     aspirin 325 MG Oral Tab, Take  by mouth., Disp: , Rfl:     montelukast 10 MG Oral Tab, Take by mouth., Disp: , Rfl:     Calcium Carbonate-Vitamin D 600-200 MG-UNIT Oral Cap, Take  by mouth., Disp: , Rfl:     Mirabegron ER (MYRBETRIQ) 8 MG/ML Oral Suspension Reconstituted ER, Take by mouth. (Patient not taking: Reported on 7/30/2024), Disp: , Rfl:     Review of Systems:oncology specific ROS negative except as per HPI    Vital Signs:  /50 (BP Location: Left arm, Patient Position: Sitting, Cuff Size: large)   Pulse 88   Temp 98 °F (36.7 °C) (Oral)   Resp 18   Ht 1.549 m (5' 1\")   Wt 86.6 kg (191 lb)   LMP 10/18/1991 (Approximate)   SpO2 93%   BMI 36.09 kg/m²     Physical Exam:  General: alert and oriented x 3, not in acute distress.  Chest: non labored breathing.   Breast: right mastectomy with chronic vascular changes, left breast normal  Extremities: no edema, cyanosis, or bruising  Neurological: motor strength grossly intact, MA4E  Psych: appropriate mood and affect      Labs:  Lab Results   Component Value Date    WBC 4.7 08/21/2023    RBC 3.74 (L) 08/21/2023    HGB 12.4 08/21/2023    HCT 38.0 08/21/2023    .6 (H) 08/21/2023    MCH 33.2 08/21/2023    MCHC 32.6 08/21/2023    RDW 15.1 (H) 08/21/2023    .0 08/21/2023    MPV 9.6 05/01/2018     Lab Results   Component Value Date     08/21/2023    K 4.5 08/21/2023     08/21/2023    CO2 28.0 08/21/2023    BUN 14 08/21/2023    CREATSERUM 0.79 08/21/2023    GLU 85 08/21/2023    CA 9.4 08/21/2023    ALKPHO 67 08/21/2023    ALT 18  08/21/2023    AST 21 08/21/2023    BILT 0.5 08/21/2023    ALB 3.6 08/21/2023    TP 6.7 08/21/2023      Radiology:  [facility] ANAMARIA 2D+3D SCREENING LEFT (CPT=77067-52/61698)    Result Date: 7/18/2024  CONCLUSION:   No mammographic evidence of malignancy.  BI-RADS CATEGORY:   DIAGNOSTIC CATEGORY 2--BENIGN FINDING:   RECOMMENDATIONS:  ROUTINE MAMMOGRAM AND CLINICAL EVALUATION IN 12 MONTHS.       PLEASE NOTE: NORMAL MAMMOGRAM DOES NOT EXCLUDE THE POSSIBILITY OF BREAST CANCER.  A CLINICALLY SUSPICIOUS PALPABLE LUMP SHOULD BE BIOPSIED.   For patients over the age of 40, the target due date for the patient's next mammogram has been entered into a reminder system.   Patient received a discharge summary from the technologist after completion of exam.  Breast marker legend used on images  Triangle = Palpable lump [place] = Skin tag or mole BB = Nipple Linear aayush = Scar Square = Pain    Dictated by (CST): Levon Lee MD on 7/18/2024 at 9:43 AM     Finalized by (CST): Levon Lee MD on 7/18/2024 at 9:45 AM              Impression and Plan:    History of right breast IDC, unknown receptor status, [id number] s/p mastectomy/ALD, chemo and radiation in 1992  - no evidence of disease. Continue screening left mammogram annually - due in 7/2025.   - mastectomy bras and new prosthesis Rx provided.     Mediastinal mass  - non FDG avid on PET 6/2023, unlikely lymphoma or breast ca.  - plan for surveillance per pulm and thoracic.  - she does have evidence of fibrosis from prior radiation    Macrocytosis- B12 normal, improving with reduction in ETOH    Discharge from oncology clinic  Patient to follow up with her PCP for continued surveillance         Montserrat Allen MD  Hematology Oncology

## 2024-08-02 ENCOUNTER — OFFICE VISIT (OUTPATIENT)
Dept: UROLOGY | Facility: HOSPITAL | Age: 79
End: 2024-08-02
Attending: PHYSICIAN ASSISTANT
Payer: MEDICARE

## 2024-08-02 VITALS — BODY MASS INDEX: 34.23 KG/M2 | HEIGHT: 62 IN | WEIGHT: 186 LBS | RESPIRATION RATE: 18 BRPM

## 2024-08-02 DIAGNOSIS — R35.1 NOCTURIA: ICD-10-CM

## 2024-08-02 DIAGNOSIS — N39.3 FEMALE STRESS INCONTINENCE: ICD-10-CM

## 2024-08-02 DIAGNOSIS — N39.41 URGE URINARY INCONTINENCE: Primary | ICD-10-CM

## 2024-08-02 DIAGNOSIS — N95.2 POSTMENOPAUSAL ATROPHIC VAGINITIS: ICD-10-CM

## 2024-08-02 PROCEDURE — 99212 OFFICE O/P EST SF 10 MIN: CPT

## 2024-08-02 RX ORDER — TIOTROPIUM BROMIDE 18 UG/1
CAPSULE ORAL; RESPIRATORY (INHALATION) DAILY
Status: ON HOLD | COMMUNITY

## 2024-08-02 NOTE — PROGRESS NOTES
Patient presents to follow up UUI    She is currently using Gemtesa 75 mg daily     She reports 25% improvement - reports Gemtesa best medication she has tried , wants to continue   Denies no significant side effects   Using vaginal estrogen cream twice a week  Bowels constipated, diet controlled   Denies s/sx of UTI     PRIOR TREATMENTS:    Kegels  Detrol LA  Vesicare  Myrbetriq - minimal improvement    Urogynecology Summary:  Urogynecology Summary  Prolapse: No  VALENTÍN: Yes  Urge Incontinence: No  Nocturia Frequency: 1  Frequency: 2 - 3 hours  Incomplete emptying: No  Constipation: Yes (prunes and fiber in diet)  Wears pad day?: 4  Activities are limited by UI/POP?: No    Resp 18   Ht 62\"   Wt 186 lb (84.4 kg)   LMP 10/18/1991 (Approximate)   BMI 34.02 kg/m²     GENERAL:  NAD  HEAD: NC/AT  EYES: symmetric bilaterally. No icterus. Sclera w/o injection  NECK: no masses  LUNGS:  Normal resp effort  ABDOMEN:  Soft, no mass  MUSK: normal gait, ROM wnl. No edema  PSYCH: A&Ox3. Recent and remote memory intact    PELVIC EXAM:  Ext. Gen: +atrophy, no lesions  Urethra: +atrophy, nontender  Bladder:+fullness, nontender  Vagina: +atrophy  Cervix: no bleeding, no lesions, nontender  Uterus: +mobile  Adnexa:no masses, nontender  Perineum: nontender  Anus: wnl  Rectum: defer     PELVIS FLOOR NEUROMUSCULAR FUNCTION:  Strength:  1 and Unable to hold greater than 3 sec  Perineal Sensation:  Normal        PELVIC SUPPORT:  [place]:  0  Ant:  0  Post:  0    Impression/Plan:    ICD-10-CM    1. Urge urinary incontinence  N39.41       2. Nocturia  R35.1       3. Postmenopausal atrophic vaginitis  N95.2       4. Female stress incontinence  N39.3           Discussion Items:   Discussed dietary and behavioral modifications for mgmt of urinary symptoms  Discussed weight management and benefits of weight loss on urinary symptoms  Reviewed AUA/SUFU guidelines on mgmt of non-neurogenic OAB  Discussed pharmacologic and nonpharmacologic mgmt  options of urinary symptoms - reviewed risks, benefits, alternatives, and goals of treatment  Discussed specific risks related to OAB meds including, but not limited to dry mouth, constipation, blurry vision, cognitive changes, and BP elevation.  Discussed mgmt of vulvovaginal atrophy with vaginal estrogen cream. Reviewed associated benefits, risks, alternatives, and goals. Recommend low dose twice weekly mgmt       Treatment Plan, Non-surgical:   Continue Gemtesa 75 mg daily  Continue vaginal estrogen cream twice weekly  Bladder diet/drill  Bowel regimen reviewed  Call with s/sx of UTI  All questions answered  She understands and agrees to plan    Return in about 1 year (around 8/2/2025) for UUI, sooner prn .    Jojo Rogers PA-C    The 21st Century Cures Act makes medical notes like these available to patients in the interest of transparency. However, be advised this is a medical document. It is intended as peer to peer communication. It is written in medical language and may contain abbreviations or verbiage that are unfamiliar. It may appear blunt or direct. Medical documents are intended to carry relevant information, facts as evident, and the clinical opinion of the practitioner.

## 2024-08-04 ENCOUNTER — HOSPITAL ENCOUNTER (INPATIENT)
Facility: HOSPITAL | Age: 79
LOS: 10 days | Discharge: HOME HEALTH | End: 2024-08-15
Attending: EMERGENCY MEDICINE | Admitting: STUDENT IN AN ORGANIZED HEALTH CARE EDUCATION/TRAINING PROGRAM
Payer: MEDICARE

## 2024-08-04 ENCOUNTER — APPOINTMENT (OUTPATIENT)
Dept: GENERAL RADIOLOGY | Facility: HOSPITAL | Age: 79
End: 2024-08-04
Attending: EMERGENCY MEDICINE
Payer: MEDICARE

## 2024-08-04 ENCOUNTER — APPOINTMENT (OUTPATIENT)
Dept: CT IMAGING | Facility: HOSPITAL | Age: 79
End: 2024-08-04
Attending: EMERGENCY MEDICINE
Payer: MEDICARE

## 2024-08-04 DIAGNOSIS — I48.0 PAROXYSMAL A-FIB (HCC): ICD-10-CM

## 2024-08-04 DIAGNOSIS — J93.9 PNEUMOTHORAX, UNSPECIFIED TYPE: Primary | ICD-10-CM

## 2024-08-04 DIAGNOSIS — J93.9 PNEUMOTHORAX, RIGHT: ICD-10-CM

## 2024-08-04 LAB
ANION GAP SERPL CALC-SCNC: 5 MMOL/L (ref 0–18)
BASOPHILS # BLD AUTO: 0.03 X10(3) UL (ref 0–0.2)
BASOPHILS NFR BLD AUTO: 0.4 %
BNP SERPL-MCNC: 96 PG/ML
BUN BLD-MCNC: 12 MG/DL (ref 9–23)
BUN/CREAT SERPL: 16.9 (ref 10–20)
CALCIUM BLD-MCNC: 10 MG/DL (ref 8.7–10.4)
CHLORIDE SERPL-SCNC: 106 MMOL/L (ref 98–112)
CO2 SERPL-SCNC: 30 MMOL/L (ref 21–32)
CREAT BLD-MCNC: 0.71 MG/DL
D DIMER PPP FEU-MCNC: 0.81 UG/ML FEU (ref ?–0.77)
DEPRECATED RDW RBC AUTO: 62.4 FL (ref 35.1–46.3)
EGFRCR SERPLBLD CKD-EPI 2021: 88 ML/MIN/1.73M2 (ref 60–?)
EOSINOPHIL # BLD AUTO: 0.05 X10(3) UL (ref 0–0.7)
EOSINOPHIL NFR BLD AUTO: 0.7 %
ERYTHROCYTE [DISTWIDTH] IN BLOOD BY AUTOMATED COUNT: 16.3 % (ref 11–15)
GLUCOSE BLD-MCNC: 106 MG/DL (ref 70–99)
HCT VFR BLD AUTO: 38.3 %
HGB BLD-MCNC: 12.5 G/DL
IMM GRANULOCYTES # BLD AUTO: 0.02 X10(3) UL (ref 0–1)
IMM GRANULOCYTES NFR BLD: 0.3 %
LYMPHOCYTES # BLD AUTO: 1.13 X10(3) UL (ref 1–4)
LYMPHOCYTES NFR BLD AUTO: 15.8 %
MCH RBC QN AUTO: 34.1 PG (ref 26–34)
MCHC RBC AUTO-ENTMCNC: 32.6 G/DL (ref 31–37)
MCV RBC AUTO: 104.4 FL
MONOCYTES # BLD AUTO: 0.74 X10(3) UL (ref 0.1–1)
MONOCYTES NFR BLD AUTO: 10.3 %
NEUTROPHILS # BLD AUTO: 5.19 X10 (3) UL (ref 1.5–7.7)
NEUTROPHILS # BLD AUTO: 5.19 X10(3) UL (ref 1.5–7.7)
NEUTROPHILS NFR BLD AUTO: 72.5 %
OSMOLALITY SERPL CALC.SUM OF ELEC: 292 MOSM/KG (ref 275–295)
PLATELET # BLD AUTO: 284 10(3)UL (ref 150–450)
POTASSIUM SERPL-SCNC: 4.1 MMOL/L (ref 3.5–5.1)
RBC # BLD AUTO: 3.67 X10(6)UL
SODIUM SERPL-SCNC: 141 MMOL/L (ref 136–145)
TROPONIN I SERPL HS-MCNC: 9 NG/L
WBC # BLD AUTO: 7.2 X10(3) UL (ref 4–11)

## 2024-08-04 PROCEDURE — 71045 X-RAY EXAM CHEST 1 VIEW: CPT | Performed by: EMERGENCY MEDICINE

## 2024-08-04 PROCEDURE — 99223 1ST HOSP IP/OBS HIGH 75: CPT | Performed by: HOSPITALIST

## 2024-08-04 RX ORDER — HYDROCODONE BITARTRATE AND ACETAMINOPHEN 5; 325 MG/1; MG/1
1 TABLET ORAL ONCE
Status: COMPLETED | OUTPATIENT
Start: 2024-08-04 | End: 2024-08-04

## 2024-08-05 ENCOUNTER — APPOINTMENT (OUTPATIENT)
Dept: GENERAL RADIOLOGY | Facility: HOSPITAL | Age: 79
End: 2024-08-05
Attending: INTERNAL MEDICINE
Payer: MEDICARE

## 2024-08-05 LAB
ANION GAP SERPL CALC-SCNC: 1 MMOL/L (ref 0–18)
ATRIAL RATE: 96 BPM
BASOPHILS # BLD AUTO: 0.04 X10(3) UL (ref 0–0.2)
BASOPHILS NFR BLD AUTO: 0.6 %
BUN BLD-MCNC: 12 MG/DL (ref 9–23)
BUN/CREAT SERPL: 17.6 (ref 10–20)
CALCIUM BLD-MCNC: 9.2 MG/DL (ref 8.7–10.4)
CHLORIDE SERPL-SCNC: 106 MMOL/L (ref 98–112)
CO2 SERPL-SCNC: 29 MMOL/L (ref 21–32)
CREAT BLD-MCNC: 0.68 MG/DL
DEPRECATED RDW RBC AUTO: 59.8 FL (ref 35.1–46.3)
EGFRCR SERPLBLD CKD-EPI 2021: 90 ML/MIN/1.73M2 (ref 60–?)
EOSINOPHIL # BLD AUTO: 0.02 X10(3) UL (ref 0–0.7)
EOSINOPHIL NFR BLD AUTO: 0.3 %
ERYTHROCYTE [DISTWIDTH] IN BLOOD BY AUTOMATED COUNT: 16.1 % (ref 11–15)
GLUCOSE BLD-MCNC: 118 MG/DL (ref 70–99)
HCT VFR BLD AUTO: 33 %
HGB BLD-MCNC: 11.1 G/DL
IMM GRANULOCYTES # BLD AUTO: 0.02 X10(3) UL (ref 0–1)
IMM GRANULOCYTES NFR BLD: 0.3 %
LYMPHOCYTES # BLD AUTO: 0.73 X10(3) UL (ref 1–4)
LYMPHOCYTES NFR BLD AUTO: 10.8 %
MCH RBC QN AUTO: 33.9 PG (ref 26–34)
MCHC RBC AUTO-ENTMCNC: 33.6 G/DL (ref 31–37)
MCV RBC AUTO: 100.9 FL
MONOCYTES # BLD AUTO: 0.54 X10(3) UL (ref 0.1–1)
MONOCYTES NFR BLD AUTO: 8 %
NEUTROPHILS # BLD AUTO: 5.39 X10 (3) UL (ref 1.5–7.7)
NEUTROPHILS # BLD AUTO: 5.39 X10(3) UL (ref 1.5–7.7)
NEUTROPHILS NFR BLD AUTO: 80 %
OSMOLALITY SERPL CALC.SUM OF ELEC: 283 MOSM/KG (ref 275–295)
P AXIS: 77 DEGREES
P-R INTERVAL: 164 MS
PLATELET # BLD AUTO: 247 10(3)UL (ref 150–450)
POTASSIUM SERPL-SCNC: 4.3 MMOL/L (ref 3.5–5.1)
Q-T INTERVAL: 346 MS
QRS DURATION: 96 MS
QTC CALCULATION (BEZET): 437 MS
R AXIS: -48 DEGREES
RBC # BLD AUTO: 3.27 X10(6)UL
SODIUM SERPL-SCNC: 136 MMOL/L (ref 136–145)
T AXIS: 83 DEGREES
VENTRICULAR RATE: 96 BPM
WBC # BLD AUTO: 6.7 X10(3) UL (ref 4–11)

## 2024-08-05 PROCEDURE — 99233 SBSQ HOSP IP/OBS HIGH 50: CPT | Performed by: HOSPITALIST

## 2024-08-05 PROCEDURE — 71045 X-RAY EXAM CHEST 1 VIEW: CPT | Performed by: INTERNAL MEDICINE

## 2024-08-05 RX ORDER — IPRATROPIUM BROMIDE AND ALBUTEROL SULFATE 2.5; .5 MG/3ML; MG/3ML
3 SOLUTION RESPIRATORY (INHALATION) EVERY 6 HOURS PRN
Status: DISCONTINUED | OUTPATIENT
Start: 2024-08-05 | End: 2024-08-13

## 2024-08-05 RX ORDER — POLYETHYLENE GLYCOL 3350 17 G/17G
17 POWDER, FOR SOLUTION ORAL DAILY
Status: DISCONTINUED | OUTPATIENT
Start: 2024-08-05 | End: 2024-08-05

## 2024-08-05 RX ORDER — MAGNESIUM HYDROXIDE/ALUMINUM HYDROXICE/SIMETHICONE 120; 1200; 1200 MG/30ML; MG/30ML; MG/30ML
30 SUSPENSION ORAL 4 TIMES DAILY PRN
Status: DISCONTINUED | OUTPATIENT
Start: 2024-08-05 | End: 2024-08-15

## 2024-08-05 RX ORDER — ONDANSETRON 2 MG/ML
4 INJECTION INTRAMUSCULAR; INTRAVENOUS EVERY 6 HOURS PRN
Status: DISCONTINUED | OUTPATIENT
Start: 2024-08-05 | End: 2024-08-12

## 2024-08-05 RX ORDER — HYDRALAZINE HYDROCHLORIDE 20 MG/ML
10 INJECTION INTRAMUSCULAR; INTRAVENOUS EVERY 4 HOURS PRN
Status: DISCONTINUED | OUTPATIENT
Start: 2024-08-05 | End: 2024-08-15

## 2024-08-05 RX ORDER — IPRATROPIUM BROMIDE AND ALBUTEROL SULFATE 2.5; .5 MG/3ML; MG/3ML
3 SOLUTION RESPIRATORY (INHALATION)
Status: DISCONTINUED | OUTPATIENT
Start: 2024-08-05 | End: 2024-08-07

## 2024-08-05 RX ORDER — HYDROMORPHONE HYDROCHLORIDE 1 MG/ML
0.5 INJECTION, SOLUTION INTRAMUSCULAR; INTRAVENOUS; SUBCUTANEOUS EVERY 2 HOUR PRN
Status: DISCONTINUED | OUTPATIENT
Start: 2024-08-05 | End: 2024-08-12

## 2024-08-05 RX ORDER — HEPARIN SODIUM 5000 [USP'U]/ML
5000 INJECTION, SOLUTION INTRAVENOUS; SUBCUTANEOUS EVERY 12 HOURS SCHEDULED
Status: DISCONTINUED | OUTPATIENT
Start: 2024-08-05 | End: 2024-08-12

## 2024-08-05 RX ORDER — METHYLPREDNISOLONE SODIUM SUCCINATE 40 MG/ML
40 INJECTION, POWDER, LYOPHILIZED, FOR SOLUTION INTRAMUSCULAR; INTRAVENOUS EVERY 12 HOURS
Status: DISCONTINUED | OUTPATIENT
Start: 2024-08-05 | End: 2024-08-06

## 2024-08-05 RX ORDER — MONTELUKAST SODIUM 10 MG/1
10 TABLET ORAL NIGHTLY
Status: DISCONTINUED | OUTPATIENT
Start: 2024-08-05 | End: 2024-08-15

## 2024-08-05 RX ORDER — POLYETHYLENE GLYCOL 3350 17 G/17G
17 POWDER, FOR SOLUTION ORAL DAILY PRN
Status: DISCONTINUED | OUTPATIENT
Start: 2024-08-05 | End: 2024-08-08

## 2024-08-05 RX ORDER — FLUTICASONE PROPIONATE AND SALMETEROL 500; 50 UG/1; UG/1
1 POWDER RESPIRATORY (INHALATION) 2 TIMES DAILY
Status: DISCONTINUED | OUTPATIENT
Start: 2024-08-05 | End: 2024-08-15

## 2024-08-05 RX ORDER — ACETAMINOPHEN 325 MG/1
650 TABLET ORAL EVERY 6 HOURS PRN
Status: DISCONTINUED | OUTPATIENT
Start: 2024-08-05 | End: 2024-08-12

## 2024-08-05 RX ORDER — ZOLPIDEM TARTRATE 5 MG/1
5 TABLET ORAL NIGHTLY PRN
Status: DISCONTINUED | OUTPATIENT
Start: 2024-08-05 | End: 2024-08-15

## 2024-08-05 RX ORDER — HYDROCODONE BITARTRATE AND ACETAMINOPHEN 10; 325 MG/1; MG/1
1 TABLET ORAL EVERY 4 HOURS PRN
Status: DISCONTINUED | OUTPATIENT
Start: 2024-08-05 | End: 2024-08-12

## 2024-08-05 RX ORDER — KETOROLAC TROMETHAMINE 15 MG/ML
15 INJECTION, SOLUTION INTRAMUSCULAR; INTRAVENOUS EVERY 6 HOURS PRN
Status: DISPENSED | OUTPATIENT
Start: 2024-08-05 | End: 2024-08-07

## 2024-08-05 RX ORDER — PANTOPRAZOLE SODIUM 40 MG/1
40 TABLET, DELAYED RELEASE ORAL
Status: DISCONTINUED | OUTPATIENT
Start: 2024-08-05 | End: 2024-08-15

## 2024-08-05 NOTE — ED QUICK NOTES
Orders for admission, patient is aware of plan and ready to go upstairs. Any questions, please call ED ALBERT Cheung at extension [phone].     Patient Covid vaccination status: Fully vaccinated     COVID Test Ordered in ED: None    COVID Suspicion at Admission: N/A    Running Infusions:  None    Mental Status/LOC at time of transport: AxOx4    Other pertinent information: Chest tube  CIWA score: N/A   NIH score:  N/A

## 2024-08-05 NOTE — PHYSICAL THERAPY NOTE
PHYSICAL THERAPY EVALUATION - INPATIENT     Room Number: 511/511-A  Evaluation Date: 2024  Type of Evaluation: Initial   Physician Order: PT Eval and Treat    Presenting Problem: Pneumothorax  Co-Morbidities : COPD on 2L O2, OA, breast cancer S/P R mastectomy  Reason for Therapy: Mobility Dysfunction and Discharge Planning    PHYSICAL THERAPY ASSESSMENT   Patient is a 78 year old female admitted 2024 for pneumothorax.  Prior to admission, patient's baseline is Pato with a cane.  Patient is currently functioning near baseline with bed mobility, transfers, and gait.  Patient is requiring supervision using a rolling walker as a result of the following impairments: decreased endurance/aerobic capacity, decreased muscular endurance, and increased O2 needs from baseline.  Physical Therapy will continue to follow for duration of hospitalization.    Patient will benefit from continued skilled PT Services at discharge to promote prior level of function.  Anticipate patient will return home with home health PT.    PLAN  PT Treatment Plan: Body mechanics;Endurance;Energy conservation;Patient education;Gait training;Strengthening;Transfer training  Rehab Potential : Good  Frequency (Obs): 3-5x/week    PHYSICAL THERAPY MEDICAL/SOCIAL HISTORY     Problem List  Principal Problem:    Pneumothorax, unspecified type      HOME SITUATION  Home Situation  Type of Home: Condo  Home Layout: One level  Lives With: Alone  Drives: Yes  Patient Owned Equipment: Rolling walker;Cane  Patient Regularly Uses: Home O2 (2L PRN)     Prior Level of [place]: Pato with cane, previous use of rolling walker from L TKA 2024    SUBJECTIVE  \"I hope this chest tube doesn't go home with me.\"    PHYSICAL THERAPY EXAMINATION   OBJECTIVE  Precautions: Limb alert - right;Chest tube to suction  Fall Risk: Standard fall risk    WEIGHT BEARING RESTRICTION  Weight Bearing Restriction: None                PAIN ASSESSMENT  Ratin  Location: chest tube  site  Management Techniques: Body mechanics;Activity promotion    COGNITION  Overall Cognitive Status:  WFL - within functional limits    RANGE OF MOTION AND STRENGTH ASSESSMENT  Upper extremity ROM and strength are within functional limits  Ernestine, not MMT'ed 2/2 chest tube discomfort  Lower extremity ROM is within functional limits  BLEs  Lower extremity strength is within functional limits  BLEs    BALANCE  Static Sitting: Normal  Dynamic Sitting: Normal  Static Standing: Normal  Dynamic Standing: Good      ACTIVITY TOLERANCE  Pulse: 86                      O2 WALK  Oxygen Therapy  SPO2% on Oxygen at Rest: 96  At rest oxygen flow (liters per minute): 3  SPO2% Ambulation on Oxygen: 89  Ambulation oxygen flow (liters per minute): 3    AM-PAC '6-Clicks' INPATIENT SHORT FORM - BASIC MOBILITY  How much difficulty does the patient currently have...  Patient Difficulty: Turning over in bed (including adjusting bedclothes, sheets and blankets)?: None   Patient Difficulty: Sitting down on and standing up from a chair with arms (e.g., wheelchair, bedside commode, etc.): A Little   Patient Difficulty: Moving from lying on back to sitting on the side of the bed?: None   How much help from another person does the patient currently need...   Help from Another: Moving to and from a bed to a chair (including a wheelchair)?: A Little   Help from Another: Need to walk in hospital room?: A Little   Help from Another: Climbing 3-5 steps with a railing?: A Little     AM-PAC Score:  Raw Score: 20   Approx Degree of Impairment: 35.83%   Standardized Score (AM-PAC Scale): 47.67   CMS Modifier (G-Code): CJ    FUNCTIONAL ABILITY STATUS  Functional Mobility/Gait Assessment  Gait Assistance: Supervision  Distance (ft): 15'+50'  Assistive Device: Rolling walker  Pattern: Within Functional Limits  Supine to Sit: independent  Sit to Stand: stand-by assist    Exercise/Education Provided:  Bed mobility  Body mechanics  Energy  conservation  Functional activity tolerated  Gait training  Posture  Transfer training    Skilled Therapy Provided: Pt tolerating household distances of gait on 3L, c/o discomfort at chest tube insertion site. Patient seen for evaluation in coordination with occupational therapy for ADL assessment/education. Patient received semi-fowlers in bed, agreeable to physical therapy evaluation. Vital signs monitored as noted above, patient experiencing dyspnea on exertion, SpO2 89% lowest during gait . Education with patient provided verbally on physical therapy plan of care, physiological benefits of out of bed mobility, energy conservation/activity pacing, and breathing technique. Next session anticipate to progress gait.    Patient history and/or personal factors that may impact the plan of care include limited social support and co-morbidities (COPD on 2L O2, OA, breast cancer S/P R mastectomy, L TKA 2/2024) affecting endurance, O2 needs . Based on the physical therapy examination of the noted systems and functional activity/participation limitations, the patient presentation is evolving given the patient demonstrates worsening of previously stable condition and demonstrates worsening of co-morbidities.      The patient's Approx Degree of Impairment: 35.83% has been calculated based on documentation in the [facility] '6 clicks' Inpatient Basic Mobility Short Form.  Research supports that patients with this level of impairment may benefit from no services, however anticipate would benefit from home-health PT for aerobic conditioning post hospitalization.  Final disposition will be made by interdisciplinary medical team.    Patient End of Session: Up in chair;Needs met;Call light within reach;RN aware of session/findings;All patient questions and concerns addressed    CURRENT GOALS  Goals to be met by: 8/15/24  Patient Goal Patient's self-stated goal is: return home safely, decrease pain   Goal #1 Patient is able to  demonstrate supine - sit EOB @ level: independent     Goal #1   Current Status    Goal #2 Patient is able to demonstrate transfers EOB to/from BSC at assistance level: modified independent with  least restrictive assistive device     Goal #2  Current Status    Goal #3 Patient is able to ambulate 150 feet with assist device:  least restrictive assistive device  at assistance level: modified independent   Goal #3   Current Status    Goal #4 Patient is able to demonstrate transfers sit to/from stand at assistance level: Pato with rolling walker    Goal #4   Current Status    Goal #5 Patient to demonstrate independence with home activity/exercise instructions provided to patient in preparation for discharge.   Goal #5   Current Status    Goal #6    Goal #6  Current Status      Patient Evaluation Complexity Level:  History Moderate - 1 or 2 personal factors and/or co-morbidities   Examination of body systems Moderate - addressing a total of 3 or more elements   Clinical Presentation  Moderate - Evolving   Clinical Decision Making  Moderate Complexity     Gait Training: 15 minutes      Rhea Cardenas, PT, DPT  [facility]  Rehab Services - Physical Therapy  [id number]

## 2024-08-05 NOTE — PLAN OF CARE
Pt alert and oriented, ambulated within the room, up to the chair and in the bathroom with staff. Pt updated on plan of care. Frequent rounding by staff. Call light within reach.   Problem: Patient Centered Care  Goal: Patient preferences are identified and integrated in the patient's plan of care  Description: Interventions:  - What would you like us to know as we care for you?   - Provide timely, complete, and accurate information to patient/family  - Incorporate patient and family knowledge, values, beliefs, and cultural backgrounds into the planning and delivery of care  - Encourage patient/family to participate in care and decision-making at the level they choose  - Honor patient and family perspectives and choices  Outcome: Progressing     Problem: Patient/Family Goals  Goal: Patient/Family Long Term Goal  Description: Patient's Long Term Goal: discharge    Interventions:  - - Monitor vitals  - Monitor appropriate labs  - Pain management  - Follow MD order  - Diagnostics per order  - Update/Informing patient and family on plan of care  - Discharge planning  - See additional Care Plan goals for specific interventions  Outcome: Progressing  Goal: Patient/Family Short Term Goal  Description: Patient's Short Term Goal: feel better    Interventions:   - - Monitor vitals  - Monitor appropriate labs  - Pain management  - Follow MD order  - Diagnostics per order  - Update/Informing patient and family on plan of care  - Discharge planning  - See additional Care Plan goals for specific interventions  Outcome: Progressing     Problem: PAIN - ADULT  Goal: Verbalizes/displays adequate comfort level or patient's stated pain goal  Description: INTERVENTIONS:  - Encourage pt to monitor pain and request assistance  - Assess pain using appropriate pain scale  - Administer analgesics based on type and severity of pain and evaluate response  - Implement non-pharmacological measures as appropriate and evaluate response  - Consider  cultural and social influences on pain and pain management  - Manage/alleviate anxiety  - Utilize distraction and/or relaxation techniques  - Monitor for opioid side effects  - Notify MD/LIP if interventions unsuccessful or patient reports new pain  - Anticipate increased pain with activity and pre-medicate as appropriate  Outcome: Progressing     Problem: CARDIOVASCULAR - ADULT  Goal: Maintains optimal cardiac output and hemodynamic stability  Description: INTERVENTIONS:  - Monitor vital signs, rhythm, and trends  - Monitor for bleeding, hypotension and signs of decreased cardiac output  - Evaluate effectiveness of vasoactive medications to optimize hemodynamic stability  - Monitor arterial and/or venous puncture sites for bleeding and/or hematoma  - Assess quality of pulses, skin color and temperature  - Assess for signs of decreased coronary artery perfusion - ex. Angina  - Evaluate fluid balance, assess for edema, trend weights  Outcome: Progressing     Problem: RESPIRATORY - ADULT  Goal: Achieves optimal ventilation and oxygenation  Description: INTERVENTIONS:  - Assess for changes in respiratory status  - Assess for changes in mentation and behavior  - Position to facilitate oxygenation and minimize respiratory effort  - Oxygen supplementation based on oxygen saturation or ABGs  - Provide Smoking Cessation handout, if applicable  - Encourage broncho-pulmonary hygiene including cough, deep breathe, Incentive Spirometry  - Assess the need for suctioning and perform as needed  - Assess and instruct to report SOB or any respiratory difficulty  - Respiratory Therapy support as indicated  - Manage/alleviate anxiety  - Monitor for signs/symptoms of CO2 retention  Outcome: Progressing

## 2024-08-05 NOTE — RESPIRATORY THERAPY NOTE
NADJA ASSESSMENT:    Pt does have a previous diagnosis of NADJA. Pt does routinely use a CPAP device at home.   CPAP INITIATION:    Pt to be placed on CPAP: yes  Pt refused: no  CPAP settings: auto  Interface: Full face    Patient not sure about the home settings, using full face mask, home o2 prn, but here now 3lpm o2    After 3pm Dr. Balderrama D/C'd the cpap order.

## 2024-08-05 NOTE — DISCHARGE INSTRUCTIONS
Sometimes managing your health at home requires assistance.  The [facility] team has recognized your preference to use [facility].  They can be reached by phone at [phone].  The fax number for your reference is [phone]..  A representative from the home health agency will contact you or your family to schedule your first visit.       Thoracic Surgery Post-Operative Appointment     Follow up appointment scheduled: with Dr. Orona on August 22nd at 2:15pm located at the [facility].  Thank you.      Thoracic Surgery Discharge Instructions     Pain Management  You will be sent home with a prescription for pain medicine.  This will likely be a narcotic pain medicine such as Oxycodone or Norco (hydrocodone/acetaminophen).  If no contraindications, start with extra strength acetaminophen (Tylenol) scheduled, and use narcotics for breakthrough pain. Ice packs can be helpful as well for surface level, skin incision pain.      - Take extra strength acetaminophen (Tylenol) 500 mg every 4 to 6 hours, as long as you have no known liver conditions.   - **Max dose for acetaminophen (Tylenol) is 4000 mg per day. If taking Norco, please note that each tab contains 325 mg of acetaminophen (Tylenol).  - Do NOT take any NSAIDS such as Ibuprofen (Advil).     Restrictions  Upon discharge home, do not get in an airplane or soak in a tub/pool until after your first follow up to see me in the office. You may shower, washing incisions gently with soap and water.    No dietary restrictions. If taking prescription pain medications you may become constipated. Fluids, fiber and over the counter stool softeners are helpful for this.    Other than that, no activity restrictions. You should attempt to be as active as possible.  What ever you feel up to doing, you can do. Walking is strongly encouraged. You may drive (not within 4 hours of taking prescription pain medications).     Exercises  Do  range of motion exercises twice a day with the arm on the side of your operation. The easiest is to \"walk\" your hand up the wall with your fingers. Eventually you should be able to get your arm straight up over your head.    You will be sent home with your breathing \"toys\" -- like your incentive spirometer. Use this frequently at home. 10 times per hour while awake, if possible. If watching TV, use it at every commercial break.    Follow-up Appointment  My office will reach out to you regarding a follow up appointment, if not already scheduled. Appointment will be approximately 1-2 weeks after discharge.     If you are experiencing any of these symptoms, please call our office at [phone]. Office hours are Monday through Friday, 8 am to 4:30 pm.  If you are calling the office after hours, please call the Thoracic Surgery On-Call number [phone], and state that you are a patient from [place] or [facility].      - Persistent fevers of 101.5 or greater  - Worsening pain  - Worsening shortness of breath  - Signs of infection in your wound such as drainage, worsening of redness, swelling or     pain.  - Anything else that concerns you.

## 2024-08-05 NOTE — PROGRESS NOTES
[facility]  part of [facility]    Progress Note    Annmarie Levin Patient Status:  Inpatient    1945 MRN [id number]   Location [facility] Attending Carlos Dominguez MD   Hosp Day # 0 PCP LLOYD ANDREWS MD     Chief Complaint:     Pneumothorax    Subjective:   Subjective:    Patient seen and examined  Endorsing SOB  No fevers chills.     Objective:   Blood pressure 118/47, pulse 71, temperature 97.6 °F (36.4 °C), temperature source Oral, resp. rate 20, height 5' 2\" (1.575 m), weight 189 lb 6.4 oz (85.9 kg), last menstrual period 10/18/1991, SpO2 98%.  Physical Exam    General: Patient is alert and oriented x3 does not appear to be in acute distress at this time  HEENT: EOMI PERRLA, atraumatic normocephalic  Cardiac: S1-S2 appreciated  Lungs: diminished air entry R lung.  Abdomen: Soft nontender nondistended positive bowel sounds  Ext: Peripheral pulses are positive  Neuro: No focal deficits noted  Psych: Normal mood  Skin: No rashes noted  MSK: Full range of motion intact      Results:   Lab Results   Component Value Date    WBC 6.7 2024    HGB 11.1 (L) 2024    HCT 33.0 (L) 2024    .0 2024    CREATSERUM 0.68 2024    BUN 12 2024     2024    K 4.3 2024     2024    CO2 29.0 2024     (H) 2024    CA 9.2 2024    ALB 3.6 2023    ALKPHO 67 2023    BILT 0.5 2023    TP 6.7 2023    AST 21 2023    ALT 18 2023    TSH 1.550 2023    DDIMER 0.81 (H) 2024    TROPHS 9 2024    B12 1,120 (H) 2022       XR CHEST AP PORTABLE  (CPT=71045)    Result Date: 2024  CONCLUSION:  1. Cardiomegaly.  Tortuous thoracic aorta.  Mediastinal configuration unchanged 2. Diffuse chronic pulmonary interstitial prominence redemonstrated. 3. Small right apical pneumothorax measuring less than 10% with right apical percutaneous pigtail catheter in the  pleural space.  Chronic right apical pleural parenchymal scarring with traction bronchiectasis noted on prior chest CT from 9/22/2023.  4. Recommend follow-up to resolution to exclude bronchopleural fistula.    Dictated by (CST): Earl Barber MD on 8/05/2024 at 11:38 AM     Finalized by (CST): Earl Barber MD on 8/05/2024 at 11:45 AM          XR CHEST AP PORTABLE  (CPT=71045)    Result Date: 8/5/2024  CONCLUSION:  1. Right-sided pigtail chest tube in place with re-expansion of right lung. 2. Chronically elevated right hemidiaphragm. 3. Right apical pleural parenchymal scarring. 4. Atherosclerotic calcification aorta. 5. Right mastectomy and axillary dissection.  1.   Dictated by (CST): Roland Wallace MD on 8/05/2024 at 11:39 AM     Finalized by (CST): Roland Wallace MD on 8/05/2024 at 11:43 AM          XR CHEST AP PORTABLE  (CPT=71045)    Result Date: 8/5/2024  CONCLUSION:  1. Large right pneumothorax.  No major discrepancy with preliminary [facility] radiology report.  Dictated by (CST): Roland Wallace MD on 8/05/2024 at 10:41 AM     Finalized by (CST): Roland Wallace MD on 8/05/2024 at 10:43 AM         EKG 12 Lead    Result Date: 8/5/2024  Normal sinus rhythm Incomplete right bundle branch block Left anterior fascicular block Moderate voltage criteria for LVH, may be normal variant ( R in aVL , Romel product ) Septal infarct , age undetermined Abnormal ECG No previous ECGs found in [facility]     Assessment & Plan:      Spontaneous pneumothorax  -Chest tube to underwater seal, pulmonary consulted, will use Norco/Toradol as needed for pain control.  Continue supplemental O2     COPD  -Start DuoNebs every 6 hours and as needed on Solu-Medrol 40 mg IV twice daily considering possibility of mild exacerbation triggering increased cough     Obesity with obstructive sleep apnea  -BMI 34.  Patient counseled regarding diet and excise, consider CPAP at night.     Prophylaxis  -Subcutaneous heparin     CODE  STATUS  Full     Global A/P  -repeat CXR shows less than 10%  -ddimer elevated.  -CT reviewed  -charleen monitor closely.  -Reviewed previous consultant notes  -Reviewed CBC, BMP, Mag, and Phos  -Reviewed tests ordered  -Repeat labs in am  -MDM: High, severe exacerbation of chronic illness posing a threat to life. IV medications requiring close inpatient monitoring.         **Certification      PHYSICIAN Certification of Need for Inpatient Hospitalization - Initial Certification    Patient will require inpatient services that will reasonably be expected to span two midnight's based on the clinical documentation in H+P.   Based on patients current state of illness, I anticipate that, after discharge, patient will require TBD.      Carlos Dominguez MD

## 2024-08-05 NOTE — ED INITIAL ASSESSMENT (HPI)
Pt presents to ed with c/o SOB. Pt reports she has been feeling SOB and chest pressure since 10am.  Pt speaking in short sentences.     Pt arrives with O2 tank from home, on 2L baseline

## 2024-08-05 NOTE — HOME CARE LIAISON
[facility] Spoke w/ patient who is agreeable with [facility]. Contact information placed on AVS.

## 2024-08-05 NOTE — ED PROVIDER NOTES
Patient Seen in: [facility] Emergency Department    History     Chief Complaint   Patient presents with    Shortness Of Breath    Chest Pain Angina     Stated Complaint: SOB    HPI    Patient complains of shortness of breath that began today 10 am..  min cough.  R sided chest pain.   no fever or chills.  no calf pain o, + feet swelling.  no recent long car ride or plane ride.   History of copd and r breast cacner.    Symptoms worse with activity.    Symptoms improved with nothing.     Past Medical History:    Arrhythmia    hx of rapid heartbeat     Perales's esophagus    Perales's esophagus    path consistent; EGD 07/17/2007; Mohamed Sait    Breast cancer ([facility])    1992 - [id number];  Mastectomy/Chemo/Radiation.,right breast    Breast cancer of upper-outer quadrant of right female breast ([facility])    1992 - [id number];  Mastectomy/Chemo/Radiation     COPD (chronic obstructive pulmonary disease) ([facility])    Environmental allergies    Esophageal reflux    History of stomach ulcers    hx    Hyperlipidemia    Malignant neoplasm of upper-outer quadrant of right breast in female, estrogen receptor negative ([facility])    1992 - [id number];  Mastectomy/Chemo/Radiation     Mixed hyperlipidemia    Organic cardiac disease    Osteoarthritis    bilateral knee    Osteoarthritis of left knee    Synvisc-One injection, Mark Johnson    Osteoarthritis of right foot    steroid injection of subtalar joint of the right foot under fluoroscopy guidance; Mark Johnson    Osteoarthritis of right knee    Synvisc-One, Mark Johnson    Osteopenia of multiple sites    PONV (postoperative nausea and vomiting)    Rosacea    Skin cancer    multiple carcinomas of the skin    Sleep apnea    CPAP 2012    Sleep apnea    CPAP titration; weight reduction, CPAP 15 cm of water, aboidance of respiratory depressants including alcohol and sedatives    Visual impairment       Past Surgical History:   Procedure Laterality Date    Carpal tunnel release      Chemotherapy Right  1992    Cholecystectomy      Colonoscopy  05/25/2005    Pearles's/constipation; EGD/colonoscopy;diverticulosis, internal hemorrhoids, gastritis, hiatal hernia, Perales's esophagus - continue PPI therapy     Colonoscopy  06/28/2011    change in bowel habits / Perales's; EGD colonoscopy biopsy; diverticula sigmoid colon, internal hemorrhoids, mild gastritis, histal hernia with reflux, mild esophagitis, no evidence of Perales's /  Arpit Ellison     Colonoscopy      Fracture surgery      General sleep study  05/09/2012    obesity, hypersomnolence snoring; sleep study; obstructive sleep apnea, favorable response to CPAP 15 cm of water / Yoesph Everett f/u 6/6/12    Angelito localization wire 1 site left (cpt=19281) Left 1993    pre ca excisional bx    Mastectomy right Right     Other surgical history      Radiation right Right 1992    Upper gi endoscopy,exam  2002    Perales's esophagus; EGD 12/10/2002; Arpit Ellison            Family History   Problem Relation Age of Onset    Heart Disease Other         grandfather    Stroke Other         aunt    Breast Cancer Sister 78        approx    Breast Cancer Self 47        approx    Breast Cancer Paternal Cousin Female 78        approx    Heart Disorder Father     Diabetes Mother        Social History     Socioeconomic History    Marital status:    Tobacco Use    Smoking status: Former    Smokeless tobacco: Former    Tobacco comments:     quit 25 yrs ago   Vaping Use    Vaping status: Never Used   Substance and Sexual Activity    Alcohol use: Yes     Alcohol/week: 2.5 standard drinks of alcohol     Types: 3 Standard drinks or equivalent per week     Comment: 3 drinks daily    Drug use: No     Social Determinants of Health     Food Insecurity: No Food Insecurity (8/5/2024)    Food Insecurity     Food Insecurity: Never true   Transportation Needs: No Transportation Needs (8/5/2024)    Transportation Needs     Lack of Transportation: No   Housing Stability: Low Risk  (8/5/2024)     Housing Stability     Housing Instability: No       Review of Systems    Positive for stated complaint: SOB  Other systems are as noted in HPI.  Constitutional and vital signs reviewed.      All other systems reviewed and negative except as noted above.    PSFH elements reviewed from today and agreed except as otherwise stated in HPI.    Physical Exam     ED Triage Vitals [08/04/24 1904]   BP (!) 164/77   Pulse 94   Resp 24   Temp 98.2 °F (36.8 °C)   Temp src Temporal   SpO2 93 %   O2 Device Nasal cannula       Current:/59 (BP Location: Right arm)   Pulse 54   Temp 97.6 °F (36.4 °C) (Oral)   Resp 21   Ht 157.5 cm (5' 2\")   Wt 85.9 kg   LMP 10/18/1991 (Approximate)   SpO2 92%   BMI 34.64 kg/m²   PULSE OX hypoxic 87% room air  GENERAL: conversational dyspnea  HEAD: normocephalic, atraumatic,   EYES: PERRLA, EOMI,  THROAT: mm dry, no lesions  NECK: supple, no meningeal signs  LUNGS:tachypnea decreased bs bilateral  CARDIO: rr  GI: abdomen is soft and non tender, no masses, nl bowel sounds   EXTREMITIES: from, 5/5 strength in all 4 ext,pedal edema  NEURO: alert and oiented *3, 2-12 intact, no focal deficit noted  SKIN: good skin turgor, no  rashes  PSYCH: calm, cooperative,    Differential includes: pneumonia vs. CHF vs. bronchitis vs. PE      ED Course     Labs Reviewed   BASIC METABOLIC PANEL (8) - Abnormal; Notable for the following components:       Result Value    Glucose 106 (*)     All other components within normal limits   D-DIMER - Abnormal; Notable for the following components:    D-Dimer 0.81 (*)     All other components within normal limits   CBC W/ DIFFERENTIAL - Abnormal; Notable for the following components:    RBC 3.67 (*)     .4 (*)     MCH 34.1 (*)     RDW-SD 62.4 (*)     RDW 16.3 (*)     All other components within normal limits   BNP (B TYPE NATRIURETIC PEPTIDE) - Normal   TROPONIN I HIGH SENSITIVITY - Normal   CBC WITH DIFFERENTIAL WITH PLATELET    Narrative:     The following  orders were created for panel order CBC With Differential With Platelet.  Procedure                               Abnormality         Status                     ---------                               -----------         ------                     CBC W/ DIFFERENTIAL[[id number]]          Abnormal            Final result                 Please view results for these tests on the individual orders.   BASIC METABOLIC PANEL (8)   CBC WITH DIFFERENTIAL WITH PLATELET     EKG    Rate, intervals and axes as noted on EKG Report.  Rate: 96  Rhythm: Sinus Rhythm  Reading: nsr with incomplete rbbb and lvh           MDM     Monitor Interpretation:  Nsr 96    Radiology Interpretation:  I reviewed xray noted r pneumothorax    PROCEDURE:  Procedure:    Chest tube placement:  The indication for the procedure was a pneumothorax.  A timeout was observed .  The patient was prepped in a sterile fashion.  The patient was anesthitized with 1% lidocaine .  Seldinger technique, needle inserted, guidewire placed then small nick in skin followed bi dilater and chest tube placement.  Sutured in place, tube was placed in the 5th intercostal space on the r side.  The tube was sutured in place and dressed.  Post placement chest x-ray demonstrated the tube to be in the appropriate position.  Following placement of the tube the patient's condition was improved.  The patient tolerated the procedure well there were no complications.  The procedure was performed by myself.      Medical Decision Making  Problems Addressed:  Pneumothorax, unspecified type: acute illness or injury with systemic symptoms that poses a threat to life or bodily functions    Amount and/or Complexity of Data Reviewed  Labs: ordered. Decision-making details documented in ED Course.  Radiology: ordered and independent interpretation performed. Decision-making details documented in ED Course.  ECG/medicine tests: ordered and independent interpretation performed. Decision-making  details documented in ED Course.  Discussion of management or test interpretation with external provider(s): Admit to hospital consulted Dr. Leal pulmonary and admit to [facility] hospitalist.    Risk  Decision regarding hospitalization.          Disposition and Plan     Clinical Impression:  1. Pneumothorax, unspecified type        Disposition:  Admit    Follow-up:  No follow-up provider specified.    Medications Prescribed:  Current Discharge Medication List          Hospital Problems       Present on Admission  Date Reviewed: 7/31/2024            ICD-10-CM Noted POA    * (Principal) Pneumothorax, unspecified type J93.9 8/4/2024 Unknown

## 2024-08-05 NOTE — H&P
[facility]  part of [facility]    History & Physical    Annmarie Levin Patient Status:  Emergency    1945 MRN [id number]   Location [facility] EMERGENCY DEPARTMENT Attending Mark Mcgregor MD   Hosp Day # 0 PCP LLOYD ANDREWS MD     Date:  2024  Date of Admission:  2024    Chief Complaint:  Chief Complaint   Patient presents with    Shortness Of Breath    Chest Pain Angina       History of Present Illness:  Annmarie Levin is a(n) 78 year old female, history significant for COPD and breast cancer s/p/mastectomy/radiation presents with a complaint of acute onset chest pain and shortness of breath earlier in the day.  Claims of late she has been coughing more than usual attributed to the weather, pain somewhat improved however started to become more short of breath, started using her O2 despite that there were times when she was desaturating into the upper 70s prompting her to go to urgent care for evaluation.  She was advised to come to the ER.  X-rays done in ER indicated presence of a right pneumothorax.  Currently pain increase status post chest tube placement    History:  Past Medical History:    Arrhythmia    hx of rapid heartbeat     Perales's esophagus    Perales's esophagus    path consistent; EGD 2007; Mohamed Sait    Breast cancer ([facility])     - [id number];  Mastectomy/Chemo/Radiation.,right breast    Breast cancer of upper-outer quadrant of right female breast ([facility])     - [id number];  Mastectomy/Chemo/Radiation     COPD (chronic obstructive pulmonary disease) ([facility])    Environmental allergies    Esophageal reflux    History of stomach ulcers    hx    Hyperlipidemia    Malignant neoplasm of upper-outer quadrant of right breast in female, estrogen receptor negative ([facility])     - [id number];  Mastectomy/Chemo/Radiation     Mixed hyperlipidemia    Organic cardiac disease    Osteoarthritis    bilateral knee    Osteoarthritis of left knee    Synvisc-One  ernestine, Mark Johnson    Osteoarthritis of right foot    steroid injection of subtalar joint of the right foot under fluoroscopy guidance; Mark Johnson    Osteoarthritis of right knee    Synvisc-One, Mark Johnson    Osteopenia of multiple sites    PONV (postoperative nausea and vomiting)    Rosacea    Skin cancer    multiple carcinomas of the skin    Sleep apnea    CPAP 2012    Sleep apnea    CPAP titration; weight reduction, CPAP 15 cm of water, aboidance of respiratory depressants including alcohol and sedatives    Visual impairment     Past Surgical History:   Procedure Laterality Date    Carpal tunnel release      Chemotherapy Right 1992    Cholecystectomy      Colonoscopy  05/25/2005    Perales's/constipation; EGD/colonoscopy;diverticulosis, internal hemorrhoids, gastritis, hiatal hernia, Perales's esophagus - continue PPI therapy     Colonoscopy  06/28/2011    change in bowel habits / Perales's; EGD colonoscopy biopsy; diverticula sigmoid colon, internal hemorrhoids, mild gastritis, histal hernia with reflux, mild esophagitis, no evidence of Perales's /  Arpit Ellison     Colonoscopy      Fracture surgery      General sleep study  05/09/2012    obesity, hypersomnolence snoring; sleep study; obstructive sleep apnea, favorable response to CPAP 15 cm of water / Yoseph Everett f/u 6/6/12    Angelito localization wire 1 site left (cpt=19281) Left 1993    pre ca excisional bx    Mastectomy right Right     Other surgical history      Radiation right Right 1992    Upper gi endoscopy,exam  2002    Perales's esophagus; EGD 12/10/2002; Arpit Ellison     Family History   Problem Relation Age of Onset    Heart Disease Other         grandfather    Stroke Other         aunt    Breast Cancer Sister 78        approx    Breast Cancer Self 46        approx    Breast Cancer Paternal Cousin Female 78        approx    Heart Disorder Father     Diabetes Mother       reports that she has quit smoking. She has quit using smokeless tobacco.  She reports current alcohol use of about 2.5 standard drinks of alcohol per week. She reports that she does not use drugs.    Allergies:  Allergies   Allergen Reactions    Sulfa Antibiotics FEVER    Adhesive Tape     Levaquin [Levofloxacin] RASH    Morphine NAUSEA ONLY     Other reaction(s): \"doesn't agree with me\"       Home Medications:  Prior to Admission Medications   Prescriptions Last Dose Informant Patient Reported? Taking?   Calcium Carbonate-Vitamin D 600-200 MG-UNIT Oral Cap   Yes No   Sig: Take  by mouth.   Cholecalciferol (VITAMIN D) 50 MCG (2000 UT) Oral Cap   Yes No   Sig: Take 1 capsule (2,000 Units total) by mouth daily.   GEMTESA 75 MG Oral Tab   Yes No   Sig: Take 1 tablet by mouth daily.   Mirabegron ER (MYRBETRIQ) 8 MG/ML Oral Suspension Reconstituted ER   Yes No   Sig: Take by mouth.   Multiple Vitamins-Minerals (MULTI FOR HER 50+) Oral Tab   Yes No   Sig: Take  by mouth daily.   Omeprazole 40 MG Oral Capsule Delayed Release   Yes No   Sig: Take 1 capsule (40 mg total) by mouth daily.   PATIENT SUPPLIED MEDICATION   Yes No   SiL of O2 as needed   SYMBICORT 160-4.5 MCG/ACT Inhalation Aerosol   Yes No   Sig: Inhale 2 puffs into the lungs 2 (two) times daily.   Vaginal Moisturizer (LUVENA PREBIOTIC LUBRICANT VA)   Yes No   Sig: Place  vaginally. Indications: One every four days, per pt's medication list   Vitamin B-12 (VITAMIN B12) 500 MCG Oral Tab   Yes No   Sig: Take 1 tablet (500 mcg total) by mouth daily.   albuterol 108 (90 Base) MCG/ACT Inhalation Aero Soln   Yes No   Sig: Inhale 2 puffs into the lungs every 4 (four) hours as needed.   aspirin 325 MG Oral Tab   Yes No   Sig: Take  by mouth.   azithromycin 250 MG Oral Tab   Yes No   Sig: Take 1 tablet (250 mg total) by mouth daily.   desonide 0.05 % External Cream   Yes No   Sig: APPLY TO SCALY AREAS ON FACE TWICE A DAY UNTIL CLEAR *NOT COVERED PER INSURANCE   estradiol (ESTRACE) 0.1 MG/GM Vaginal Cream   No No   Sig: Apply 1/2 gram  vaginally 2 times per week. Use at bedtime.   fluticasone propionate 50 MCG/ACT Nasal Suspension   Yes No   Si sprays by Nasal route daily.   montelukast 10 MG Oral Tab   Yes No   Sig: Take by mouth.   multivitamin Oral Tab   Yes No   Sig: Take 1 tablet by mouth daily with breakfast.   tiotropium 18 MCG Inhalation Cap   Yes No   Sig: Inhale into the lungs daily.      Facility-Administered Medications: None       Review of Systems:  Constitutional:  Weakness, Fatigue.  Eye:  Negative.  Ear/Nose/Mouth/Throat:  Negative.  Respiratory:  Negative  Cardiovascular: Negative  Gastrointestinal:  Negative.  Genitourinary:  Negative  Endocrine:  Negative.  Immunologic:  Negative.  Musculoskeletal:  Negative.  Integumentary:  Negative.  Neurologic:  Negative.  Psychiatric:  Negative.  ROS reviewed as documented in chart    Physical Exam:  Temp:  [98.2 °F (36.8 °C)] 98.2 °F (36.8 °C)  Pulse:  [] 92  Resp:  [19-24] 19  BP: (146-172)/(61-78) 146/68  SpO2:  [93 %-96 %] 95 %    General:  Alert and oriented.  Diffuse skin problem:  None.  Eye:  Pupils are equal, round and reactive to light, extraocular movements are intact, Normal conjunctiva.  HENT:  Normocephalic, oral mucosa is moist.  Head:  Normocephalic, atraumatic.  Neck:  Supple, non-tender, no carotid bruit, no jugular venous distention, no lymphadenopathy, no thyromegaly.  Respiratory:  Lungs are clear to auscultation, respirations are non-labored, breath sounds are equal but diminished, symmetrical chest wall expansion.  Cardiovascular:  Normal rate, regular rhythm, no murmur, no edema.  Gastrointestinal:  Soft, non-tender, non-distended, normal bowel sounds, no organomegaly.  Lymphatics:  No lymphadenopathy neck, axilla, groin.  Musculoskeletal: Normal range of motion.  normal strength.  Feet:  Normal pulses.  Neurologic:  Alert, oriented, no focal deficits, cranial nerves II-XII are grossly intact.  Cognition and Speech:  Oriented, speech clear and  coherent.  Psychiatric:  Cooperative, appropriate mood & affect.      Laboratory Data:   Lab Results   Component Value Date    WBC 7.2 08/04/2024    HGB 12.5 08/04/2024    HCT 38.3 08/04/2024    .0 08/04/2024    CREATSERUM 0.71 08/04/2024    BUN 12 08/04/2024     08/04/2024    K 4.1 08/04/2024     08/04/2024    CO2 30.0 08/04/2024     08/04/2024    CA 10.0 08/04/2024    DDIMER 0.81 08/04/2024       Imaging:  No results found.     Assessment and Plan:    Spontaneous pneumothorax  Chest tube to underwater seal, pulmonary consulted, will use Norco/Toradol as needed for pain control.  Continue supplemental O2    Chronic respiratory failure  Resume home O2, titrate as needed, stated previously pulmonary on board.    COPD  Start DuoNebs every 6 hours and as needed on Solu-Medrol 40 mg IV twice daily considering possibility of mild exacerbation triggering increased cough    Obesity with obstructive sleep apnea  BMI 34.  Patient counseled regarding diet and excise, consider CPAP at night.    Prophylaxis  Subcutaneous heparin    CODE STATUS  Full    Primary care physician  LLOYD ANDREWS MD    MDM: High, acute illness/severe exacerbation of chronic illness posing threat to life.  IV medications requiring close inpatient monitoring 60 minutes spent on this admission - examining patient, obtaining history, reviewing previous medical records, going over test results/imaging and discussing plan of care. All questions answered.     Disposition  Clinical course will dictate outcome      ZEENAT HURTADO MD  8/4/2024  11:12 PM

## 2024-08-05 NOTE — PLAN OF CARE
Patient alert and oriented. Tele. Call light within reach. Frequent rounding by staff.  Problem: Patient Centered Care  Goal: Patient preferences are identified and integrated in the patient's plan of care  Description: Interventions:  - What would you like us to know as we care for you?   - Provide timely, complete, and accurate information to patient/family  - Incorporate patient and family knowledge, values, beliefs, and cultural backgrounds into the planning and delivery of care  - Encourage patient/family to participate in care and decision-making at the level they choose  - Honor patient and family perspectives and choices  Outcome: Progressing     Problem: Patient/Family Goals  Goal: Patient/Family Long Term Goal  Description: Patient's Long Term Goal: discharge    Interventions:  - - Monitor vitals  - Monitor appropriate labs  - Pain management  - Follow MD order  - Diagnostics per order  - Update/Informing patient and family on plan of care  - Discharge planning  - See additional Care Plan goals for specific interventions  Outcome: Progressing  Goal: Patient/Family Short Term Goal  Description: Patient's Short Term Goal: feel better    Interventions:   - - Monitor vitals  - Monitor appropriate labs  - Pain management  - Follow MD order  - Diagnostics per order  - Update/Informing patient and family on plan of care  - Discharge planning  - See additional Care Plan goals for specific interventions  Outcome: Progressing     Problem: PAIN - ADULT  Goal: Verbalizes/displays adequate comfort level or patient's stated pain goal  Description: INTERVENTIONS:  - Encourage pt to monitor pain and request assistance  - Assess pain using appropriate pain scale  - Administer analgesics based on type and severity of pain and evaluate response  - Implement non-pharmacological measures as appropriate and evaluate response  - Consider cultural and social influences on pain and pain management  - Manage/alleviate anxiety  -  Utilize distraction and/or relaxation techniques  - Monitor for opioid side effects  - Notify MD/LIP if interventions unsuccessful or patient reports new pain  - Anticipate increased pain with activity and pre-medicate as appropriate  Outcome: Progressing     Problem: CARDIOVASCULAR - ADULT  Goal: Maintains optimal cardiac output and hemodynamic stability  Description: INTERVENTIONS:  - Monitor vital signs, rhythm, and trends  - Monitor for bleeding, hypotension and signs of decreased cardiac output  - Evaluate effectiveness of vasoactive medications to optimize hemodynamic stability  - Monitor arterial and/or venous puncture sites for bleeding and/or hematoma  - Assess quality of pulses, skin color and temperature  - Assess for signs of decreased coronary artery perfusion - ex. Angina  - Evaluate fluid balance, assess for edema, trend weights  Outcome: Progressing     Problem: RESPIRATORY - ADULT  Goal: Achieves optimal ventilation and oxygenation  Description: INTERVENTIONS:  - Assess for changes in respiratory status  - Assess for changes in mentation and behavior  - Position to facilitate oxygenation and minimize respiratory effort  - Oxygen supplementation based on oxygen saturation or ABGs  - Provide Smoking Cessation handout, if applicable  - Encourage broncho-pulmonary hygiene including cough, deep breathe, Incentive Spirometry  - Assess the need for suctioning and perform as needed  - Assess and instruct to report SOB or any respiratory difficulty  - Respiratory Therapy support as indicated  - Manage/alleviate anxiety  - Monitor for signs/symptoms of CO2 retention  Outcome: Progressing

## 2024-08-05 NOTE — CM/SW NOTE
08/05/24 1200   CM/ Referral Data   Referral Source    Reason for Referral Discharge planning   Informant Patient   Medical Hx   Does patient have an established PCP? Yes  (Caio Chin MD)   Significant Past Medical/Mental Health Hx COPD and breast cancer s/p/mastectomy/radiation   Patient Info   Patient's Current Mental Status at Time of Assessment Alert;Oriented   Patient's Home Environment Condo/Apt with elevator   Patient lives with Alone   Patient Status Prior to Admission   Independent with ADLs and Mobility No   Pt. requires assistance with Ambulating   Services in place prior to admission DME/Supplies at home   DME Provider/Supplier [facility]  (Home Oxygen, Baseline 2L w/ exertion)   Type of DME/Supplies Straight Cane;Wheeled Walker;Shower Chair;Raised Toilet Seat;Grab Bars;Home Oxygen   Discharge Needs   Anticipated D/C needs Home health care   Choice of Post-Acute Provider   Informed patient of right to choose their preferred provider Yes   List of appropriate post-acute services provided to patient/family with quality data Yes   [facility]/family choice [facility]   Information given to Patient     CM met with patient at bedside to discuss discharge planning.  Address on file and PCP verified.    Patient lives alone.  Patient states she primarily uses a walker for ambulation.  Patient has home oxygen arranged through [facility] - and states her baseline is 2L with exertion. Patient is requiring 3L supplemental oxygen while inpatient - nursing attempting to wean.    Anticipated therapy need: Home with Home Healthcare    CM discussed anticipated need above.  Patient is agreeable to HH arrangement - CM provided list of HHA for patient to review.  Patient confirms her choice agency is [facility].  CM reserved H via [facility], and notified liaison Melita of patient choice. Patient discharge instructions updated with [facility] contact information.     [facility]  P:[phone]  F:[phone]    / to remain available for support and/or discharge planning.     Plan: Home with [facility] and [facility] home oxygen, pending medical clearance    Krista Lorenz RN, BSN  Nurse   [phone]

## 2024-08-05 NOTE — CDS QUERY
.How to answer this Query:     1.) DON'T CLICK COSIGN BUTTON FIRST  2.) Click \"3 dots...\" to the right of cosign button and click EDIT on the toolbar.  2.) Type an \"X\" in the bracket for the diagnosis that applies. (You may also add additional clinical details as you feel necessary to substantiate your response).   3.) Finally click \"Sign\" to complete response.  Thank You      CLINICAL DOCUMENTATION CLARIFICATION     Dear Doctor Alberto,    Clinical information (provided below) includes a diagnosis of sob/dyspnea. Please clarify respiratory status of the patient.     PLEASE (X) DIAGNOSIS THAT APPLIES.     (  x  ) Chronic Respiratory Failure                (    ) Other - please specify:_____________________________      Clinical Indicators:  O2 tank from home, on 2L baseline per ED  CXR 8/4/24 - Large right pneumothorax   Treatment: O2 supplement, Chest tube, duonebs,     If you have any questions, please contact Clinical :  Melinda JONES at [phone]     Thank You!    THIS FORM IS A PERMANENT PART OF THE MEDICAL RECORD

## 2024-08-05 NOTE — OCCUPATIONAL THERAPY NOTE
OCCUPATIONAL THERAPY EVALUATION - INPATIENT     Room Number: 511/511-A  Evaluation Date: 8/5/2024  Type of Evaluation: Initial  Presenting Problem: pneumothorax    Physician Order: IP Consult to Occupational Therapy  Reason for Therapy: ADL/IADL Dysfunction and Discharge Planning    OCCUPATIONAL THERAPY ASSESSMENT   Patient is a 78 year old female admitted 8/4/2024 for pneumothorax.  Prior to admission, patient's baseline is MOD I with ADLS, IADLs, lives alone.  Patient is currently functioning below baseline with toileting, bathing, lower body dressing, bed mobility, transfers, dynamic standing balance, functional standing tolerance, energy conservation strategies, and aerobic capacity.  Patient is requiring stand-by assist and contact guard assist as a result of the following impairments: decreased functional strength, decreased functional reach, decreased endurance, pain, impaired dynamic balance, medical status, and increased O2 needs from baseline. Occupational Therapy will continue to follow for duration of hospitalization.    Patient will benefit from continued skilled OT Services at discharge to promote prior level of function and safety with additional support and return home with home health OT    PLAN  OT Treatment Plan: Balance activities;Energy conservation/work simplification techniques;IADL training;ADL training;Functional transfer training;Patient/Family education;Patient/Family training  OT Device Recommendations: TBD    OCCUPATIONAL THERAPY MEDICAL/SOCIAL HISTORY   Problem List  Principal Problem:    Pneumothorax, unspecified type    HOME SITUATION  Type of Home: Condo  Home Layout: One level  Lives With: Alone  Toilet and Equipment: Toilet riser with arms  Shower/Tub and Equipment: Walk-in shower; Grab bar; Shower chair  Other Equipment: Other (Comment) (cane)  Hand Dominance: Right  Drives: Yes  Patient Regularly Uses: Home O2 (2L PRN)    Stairs in Home: 0  Use of Assistive Device(s):  jose    Prior Level of Garza: MOD I with ADLs, IADLs, driving, lives alone    SUBJECTIVE  \"They say this all happened because I coughed!\"    OCCUPATIONAL THERAPY EXAMINATION    OBJECTIVE  Precautions: Limb alert - right; Chest tube to suction  Fall Risk: Standard fall risk    PAIN ASSESSMENT  Ratin  Location: R drain site  Management Techniques: Activity promotion; Breathing techniques; Body mechanics; Repositioning    ACTIVITY TOLERANCE  Pulse: 86                    O2 SATURATIONS  Oxygen Therapy  SPO2% on Oxygen at Rest: 96  At rest oxygen flow (liters per minute): 3  SPO2% Ambulation on Oxygen: 89  Ambulation oxygen flow (liters per minute): 3    COGNITION  Overall Cognitive Status:  WFL - within functional limits    VISION  Current Vision: wears glasses all the time    PERCEPTION  Overall Perception Status:   WFL - within functional limits    SENSATION  Light touch:  intact    Communication: WFL    Behavioral/Emotional/Social: WFL    RANGE OF MOTION   Upper extremity ROM is within functional limits     STRENGTH ASSESSMENT  Upper extremity strength is within functional limits     COORDINATION  Gross Motor: WFL   Fine Motor: WFL     ACTIVITIES OF DAILY LIVING ASSESSMENT  AM-PAC ‘6-Clicks’ Inpatient Daily Activity Short Form  How much help from another person does the patient currently need…  -   Putting on and taking off regular lower body clothing?: A Little  -   Bathing (including washing, rinsing, drying)?: A Little  -   Toileting, which includes using toilet, bedpan or urinal? : A Little  -   Putting on and taking off regular upper body clothing?: None  -   Taking care of personal grooming such as brushing teeth?: None  -   Eating meals?: None    AM-PAC Score:  Score: 21  Approx Degree of Impairment: 32.79%  Standardized Score (AM-PAC Scale): 44.27  CMS Modifier (G-Code): CJ    FUNCTIONAL TRANSFER ASSESSMENT  Sit to Stand: Edge of Bed; Chair  Edge of Bed: Stand-by Assist  Chair: Stand-by  Assist  Toilet Transfer: Stand-by Assist    BED MOBILITY  Supine to Sit : Stand-by Assist    BALANCE ASSESSMENT  Static Sitting: Stand-by Assist  Sitting Bilateral: Stand-by Assist  Static Standing: Stand-by Assist  Standing Bilateral: Stand-by Assist    FUNCTIONAL ADL ASSESSMENT  Eating: Supervision  Grooming Seated: Supervision  Bathing Seated: Contact Guard Assist  UB Dressing Seated: Supervision  LB Dressing Seated: Minimal Assist  Toileting Seated: Stand-by Assist    THERAPEUTIC EXERCISE     Skilled Therapy Provided: Pt seen for skilled OT evaluation to address pt's I and safety with functional mobility, t/fs, and ADLs. Pt's RN approved session and pt agreeable. Pt performing functional mobility and t/fs with SBA with RW 2/2 decreased endurance and pain. Pt performed LBD with MIN A, grooming with NIXON in chair. Pt edu provided re: role of OT, safe t/f techniques, DME/AE for ADLs, fall prevention, safe RW management, and energy conservation principles. Pt demo'd understanding of all concepts covered. End of session, pt up in chair, all needs met. RN aware of pt status. Continue skilled OT.       EDUCATION PROVIDED  Patient: Role of Occupational Therapy; Plan of Care; Discharge Recommendations; DME Recommendations; Functional Transfer Techniques; Fall Prevention; Energy Conservation; Compensatory ADL Techniques; Proper Body Mechanics  Patient's Response to Education: Verbalized Understanding; Returned Demonstration    The patient's Approx Degree of Impairment: 32.79% has been calculated based on documentation in the [facility] '6 clicks' Inpatient Daily Activity Short Form.  Research supports that patients with this level of impairment may benefit from home.  Final disposition will be made by interdisciplinary medical team.     Patient End of Session: Up in chair;Needs met;Call light within reach;RN aware of session/findings;All patient questions and concerns addressed;Alarm set    OT Goals  Patients self stated goal  is: go home     Patient will complete functional transfer with MOD I   Comment:     Patient will complete toileting with MOD I   Comment:     Patient will tolerate standing for 3 minutes in prep for adls with MOD I    Comment:    Patient will complete item retrieval with MOD I  Comment:          Goals  on: 24  Frequency: 3-5x/week    Patient Evaluation Complexity Level:   Occupational Profile/Medical History MODERATE - Expanded review of history including review of medical or therapy record   Specific performance deficits impacting engagement in ADL/IADL MODERATE  3 - 5 performance deficits   Client Assessment/Performance Deficits MODERATE - Comorbidities and min to mod modifications of tasks    Clinical Decision Making MODERATE - Analysis of occupational profile, detailed assessments, several treatment options    Overall Complexity MODERATE     OT Session Time:   Self-Care Home Management: 18 minutes    Dayanna Padron, Occupational Therapist, MS, OTR/L

## 2024-08-06 ENCOUNTER — APPOINTMENT (OUTPATIENT)
Dept: GENERAL RADIOLOGY | Facility: HOSPITAL | Age: 79
End: 2024-08-06
Attending: INTERNAL MEDICINE
Payer: MEDICARE

## 2024-08-06 LAB
BASOPHILS # BLD AUTO: 0 X10(3) UL (ref 0–0.2)
BASOPHILS NFR BLD AUTO: 0 %
DEPRECATED RDW RBC AUTO: 59.5 FL (ref 35.1–46.3)
EOSINOPHIL # BLD AUTO: 0 X10(3) UL (ref 0–0.7)
EOSINOPHIL NFR BLD AUTO: 0 %
ERYTHROCYTE [DISTWIDTH] IN BLOOD BY AUTOMATED COUNT: 15.9 % (ref 11–15)
HCT VFR BLD AUTO: 32.4 %
HGB BLD-MCNC: 10.7 G/DL
IMM GRANULOCYTES # BLD AUTO: 0.01 X10(3) UL (ref 0–1)
IMM GRANULOCYTES NFR BLD: 0.2 %
LYMPHOCYTES # BLD AUTO: 0.38 X10(3) UL (ref 1–4)
LYMPHOCYTES NFR BLD AUTO: 7.5 %
MAGNESIUM SERPL-MCNC: 1.8 MG/DL (ref 1.6–2.6)
MCH RBC QN AUTO: 33.8 PG (ref 26–34)
MCHC RBC AUTO-ENTMCNC: 33 G/DL (ref 31–37)
MCV RBC AUTO: 102.2 FL
MONOCYTES # BLD AUTO: 0.45 X10(3) UL (ref 0.1–1)
MONOCYTES NFR BLD AUTO: 8.9 %
NEUTROPHILS # BLD AUTO: 4.23 X10 (3) UL (ref 1.5–7.7)
NEUTROPHILS # BLD AUTO: 4.23 X10(3) UL (ref 1.5–7.7)
NEUTROPHILS NFR BLD AUTO: 83.4 %
PHOSPHATE SERPL-MCNC: 3.3 MG/DL (ref 2.4–5.1)
PLATELET # BLD AUTO: 253 10(3)UL (ref 150–450)
RBC # BLD AUTO: 3.17 X10(6)UL
WBC # BLD AUTO: 5.1 X10(3) UL (ref 4–11)

## 2024-08-06 PROCEDURE — 99233 SBSQ HOSP IP/OBS HIGH 50: CPT | Performed by: HOSPITALIST

## 2024-08-06 PROCEDURE — 71045 X-RAY EXAM CHEST 1 VIEW: CPT | Performed by: INTERNAL MEDICINE

## 2024-08-06 RX ORDER — MAGNESIUM OXIDE 400 MG/1
400 TABLET ORAL ONCE
Status: COMPLETED | OUTPATIENT
Start: 2024-08-06 | End: 2024-08-06

## 2024-08-06 RX ORDER — PREDNISONE 20 MG/1
40 TABLET ORAL
Status: COMPLETED | OUTPATIENT
Start: 2024-08-07 | End: 2024-08-11

## 2024-08-06 NOTE — PROGRESS NOTES
Pulmonary Progress Note     Assessment / Plan:  PTX - has known RUL scarring  - continue chest tube to suction. Continuous air leak yesterday- now intermittent  Chronic respiratory failure  - at baseline uses supplemental O2 at 2 LPM as needed  COPD - possible exacerbation  - IV to PO steroids  - continue home inhalers  NADJA  - CPAP deferred due to PTX  Dispo  - will follow      Subjective:  No new complaints    Objective:  Vitals:    08/06/24 0454 08/06/24 0615 08/06/24 0840 08/06/24 1138   BP:  130/58 126/56 122/57   BP Location:  Left arm Left arm Left arm   Pulse: 84 89 91 85   Resp:  19 20 20   Temp:  98 °F (36.7 °C) 97.2 °F (36.2 °C) 97.5 °F (36.4 °C)   TempSrc:  Oral Oral Oral   SpO2: 95% 92% 94% 93%   Weight:       Height:         Physical Exam:  General: no apparent distress, conversant  Skin: no rash, ulcers or subcutaneous nodules  Eyes: anicteric sclerae, moist conjunctivae  Head, ears, nose, throat: atraumatic, oropharynx clear with moist mucous membranes  Neck: trachea midline with no thyromegaly  Heart: regular rate and rhythm, no murmurs / rubs / gallops  Lungs: clear bilaterally, normal respiratory effort, no accessory muscle use  Chest: chest tube in place  Extremities: no edema or cyanosis  Psych: interactive, answering questions appropriately, appropriate affect    Medications:  Reviewed in EMR    Lab Data:  Reviewed in EMR    Imaging:  I independently visualized all relevant chest imaging in PACS and agree with radiology interpretation except where noted.

## 2024-08-06 NOTE — PLAN OF CARE
Problem: Patient Centered Care  Goal: Patient preferences are identified and integrated in the patient's plan of care  Description: Interventions:  - What would you like us to know as we care for you? From home alone  - Provide timely, complete, and accurate information to patient/family  - Incorporate patient and family knowledge, values, beliefs, and cultural backgrounds into the planning and delivery of care  - Encourage patient/family to participate in care and decision-making at the level they choose  - Honor patient and family perspectives and choices  Outcome: Progressing     Problem: Patient/Family Goals  Goal: Patient/Family Long Term Goal  Description: Patient's Long Term Goal: discharge    Interventions:  - - Monitor vitals  - Monitor appropriate labs  - Pain management  - Follow MD order  - Diagnostics per order  - Update/Informing patient and family on plan of care  - Discharge planning  - See additional Care Plan goals for specific interventions  Outcome: Progressing  Goal: Patient/Family Short Term Goal  Description: Patient's Short Term Goal: feel better    Interventions:   - - Monitor vitals  - Monitor appropriate labs  - Pain management  - Follow MD order  - Diagnostics per order  - Update/Informing patient and family on plan of care  - Discharge planning  - See additional Care Plan goals for specific interventions  Outcome: Progressing     Problem: PAIN - ADULT  Goal: Verbalizes/displays adequate comfort level or patient's stated pain goal  Description: INTERVENTIONS:  - Encourage pt to monitor pain and request assistance  - Assess pain using appropriate pain scale  - Administer analgesics based on type and severity of pain and evaluate response  - Implement non-pharmacological measures as appropriate and evaluate response  - Consider cultural and social influences on pain and pain management  - Manage/alleviate anxiety  - Utilize distraction and/or relaxation techniques  - Monitor for opioid  side effects  - Notify MD/LIP if interventions unsuccessful or patient reports new pain  - Anticipate increased pain with activity and pre-medicate as appropriate  Outcome: Progressing     Problem: CARDIOVASCULAR - ADULT  Goal: Maintains optimal cardiac output and hemodynamic stability  Description: INTERVENTIONS:  - Monitor vital signs, rhythm, and trends  - Monitor for bleeding, hypotension and signs of decreased cardiac output  - Evaluate effectiveness of vasoactive medications to optimize hemodynamic stability  - Monitor arterial and/or venous puncture sites for bleeding and/or hematoma  - Assess quality of pulses, skin color and temperature  - Assess for signs of decreased coronary artery perfusion - ex. Angina  - Evaluate fluid balance, assess for edema, trend weights  Outcome: Progressing     Problem: RESPIRATORY - ADULT  Goal: Achieves optimal ventilation and oxygenation  Description: INTERVENTIONS:  - Assess for changes in respiratory status  - Assess for changes in mentation and behavior  - Position to facilitate oxygenation and minimize respiratory effort  - Oxygen supplementation based on oxygen saturation or ABGs  - Provide Smoking Cessation handout, if applicable  - Encourage broncho-pulmonary hygiene including cough, deep breathe, Incentive Spirometry  - Assess the need for suctioning and perform as needed  - Assess and instruct to report SOB or any respiratory difficulty  - Respiratory Therapy support as indicated  - Manage/alleviate anxiety  - Monitor for signs/symptoms of CO2 retention  Outcome: Progressing     Monitoring vital signs, stable at this moment, wearing 2L per baseline. Pain and nausea medication provided as needed. Call light within reach, bed locked in lowest position, all fall precautions in place. All needs addressed. Frequent rounding maintained by nursing staff. Patient completing IV steroid therapy per MD order, chest tube in place per order, no acute changes at this time.

## 2024-08-06 NOTE — PLAN OF CARE
Pt alert and oriented, ambulates within the room, up to the chair and into the bathroom. Tolerates well with the walker. Pt educated on chest tube management. Pt updated on plan of care. Frequent rounding by staff, call light within reach.   Problem: Patient Centered Care  Goal: Patient preferences are identified and integrated in the patient's plan of care  Description: Interventions:  - What would you like us to know as we care for you? From home alone  - Provide timely, complete, and accurate information to patient/family  - Incorporate patient and family knowledge, values, beliefs, and cultural backgrounds into the planning and delivery of care  - Encourage patient/family to participate in care and decision-making at the level they choose  - Honor patient and family perspectives and choices  8/6/2024 1247 by Ping Carney RN  Outcome: Progressing  8/6/2024 1244 by Ping Carney RN  Outcome: Progressing     Problem: Patient/Family Goals  Goal: Patient/Family Long Term Goal  Description: Patient's Long Term Goal: discharge    Interventions:  - - Monitor vitals  - Monitor appropriate labs  - Pain management  - Follow MD order  - Diagnostics per order  - Update/Informing patient and family on plan of care  - Discharge planning  - See additional Care Plan goals for specific interventions  8/6/2024 1247 by Ping Carney RN  Outcome: Progressing  8/6/2024 1244 by Ping Carney RN  Outcome: Progressing  Goal: Patient/Family Short Term Goal  Description: Patient's Short Term Goal: feel better    Interventions:   - - Monitor vitals  - Monitor appropriate labs  - Pain management  - Follow MD order  - Diagnostics per order  - Update/Informing patient and family on plan of care  - Discharge planning  - See additional Care Plan goals for specific interventions  8/6/2024 1247 by Ping Carney, RN  Outcome: Progressing  8/6/2024 1244 by Ping Carney RN  Outcome: Progressing     Problem: PAIN - ADULT  Goal:  Verbalizes/displays adequate comfort level or patient's stated pain goal  Description: INTERVENTIONS:  - Encourage pt to monitor pain and request assistance  - Assess pain using appropriate pain scale  - Administer analgesics based on type and severity of pain and evaluate response  - Implement non-pharmacological measures as appropriate and evaluate response  - Consider cultural and social influences on pain and pain management  - Manage/alleviate anxiety  - Utilize distraction and/or relaxation techniques  - Monitor for opioid side effects  - Notify MD/LIP if interventions unsuccessful or patient reports new pain  - Anticipate increased pain with activity and pre-medicate as appropriate  8/6/2024 1247 by Ping Carney, RN  Outcome: Progressing  8/6/2024 1244 by Ping Carney, RN  Outcome: Progressing     Problem: CARDIOVASCULAR - ADULT  Goal: Maintains optimal cardiac output and hemodynamic stability  Description: INTERVENTIONS:  - Monitor vital signs, rhythm, and trends  - Monitor for bleeding, hypotension and signs of decreased cardiac output  - Evaluate effectiveness of vasoactive medications to optimize hemodynamic stability  - Monitor arterial and/or venous puncture sites for bleeding and/or hematoma  - Assess quality of pulses, skin color and temperature  - Assess for signs of decreased coronary artery perfusion - ex. Angina  - Evaluate fluid balance, assess for edema, trend weights  8/6/2024 1247 by Ping Carney, RN  Outcome: Progressing  8/6/2024 1244 by Ping Carney, RN  Outcome: Progressing     Problem: RESPIRATORY - ADULT  Goal: Achieves optimal ventilation and oxygenation  Description: INTERVENTIONS:  - Assess for changes in respiratory status  - Assess for changes in mentation and behavior  - Position to facilitate oxygenation and minimize respiratory effort  - Oxygen supplementation based on oxygen saturation or ABGs  - Provide Smoking Cessation handout, if applicable  - Encourage  broncho-pulmonary hygiene including cough, deep breathe, Incentive Spirometry  - Assess the need for suctioning and perform as needed  - Assess and instruct to report SOB or any respiratory difficulty  - Respiratory Therapy support as indicated  - Manage/alleviate anxiety  - Monitor for signs/symptoms of CO2 retention  8/6/2024 1247 by Ping Carney, RN  Outcome: Progressing  8/6/2024 1244 by Ping Carney, RN  Outcome: Progressing

## 2024-08-06 NOTE — CONSULTS
Pulmonary Consult     Assessment / Plan:  PTX - has known RUL scarring  - continue chest tube to suction  Chronic respiratory failure  - at baseline uses supplemental O2 at 2 LPM as needed  COPD - possible exacerbation  - IV steroids  - Trelegy in lieu of home inhalers  NADJA  - CPAP deferred due to PTX  Dispo  - will follow      History of Present Illness:   Ms. Levin is a 78 year old with history of chronic respiratory failure, COPD and NADJA on CPAP who we are asked to see for PTX. Noted to have worsening cough over the last several days and then developed acute onset of chest pain and dyspnea the day of admission. She presented to the ED and was found to have a large right PTX and a chest tube was subsequently placed.     12 point ROS negative except per HPI.    Past Medical History:    Arrhythmia    hx of rapid heartbeat     Perales's esophagus    Perales's esophagus    path consistent; EGD 07/17/2007; Arpit Sait    Breast cancer ([facility])    1992 - [id number];  Mastectomy/Chemo/Radiation.,right breast    Breast cancer of upper-outer quadrant of right female breast ([facility])    1992 - [id number];  Mastectomy/Chemo/Radiation     COPD (chronic obstructive pulmonary disease) ([facility])    Environmental allergies    Esophageal reflux    History of stomach ulcers    hx    Hyperlipidemia    Malignant neoplasm of upper-outer quadrant of right breast in female, estrogen receptor negative ([facility])    1992 - [id number];  Mastectomy/Chemo/Radiation     Mixed hyperlipidemia    Organic cardiac disease    Osteoarthritis    bilateral knee    Osteoarthritis of left knee    Synvisc-Mark Sheikh    Osteoarthritis of right foot    steroid injection of subtalar joint of the right foot under fluoroscopy guidance; Mark Johnson    Osteoarthritis of right knee    Mark Alva    Osteopenia of multiple sites    PONV (postoperative nausea and vomiting)    Rosacea    Skin cancer    multiple carcinomas of the skin    Sleep apnea     Speech-Language Pathology Evaluation  Video Swallow Study    Visit Type: Video Swallow Study  Referring Provider: Collin Jesus MD  Medical Diagnosis (from order): Diagnosis Information      Diagnosis    J18.9 (ICD-10-CM) - Frequent episodes of pneumonia            Date of onset: 4 or 5 months ago  Chart reviewed at time of initial evaluation (relevant co-morbidities, allergies, tests and medications listed): Past Medical History:  No date: Anxiety  No date: arthritis  No date: Asthma (CMD)  No date: Bipolar affective, mixed  (CMD)  No date: Bowel obstruction  (CMD)  No date: Carpal tunnel syndrome  No date: Degenerative disc disease  No date: Delayed emergence from general anesthesia  No date: Depressive disorder  No date: Dissociative amnesia  (CMD)      Comment:  from trama - emotional  No date: Diverticula of colon  8/31/2014: Epigastric abdominal pain  No date: Failed moderate sedation during procedure      Comment:  WOKE DURING IV SEDATION. EGD  No date: Fibromyalgia  No date: Foot deformity  No date: Genital warts  No date: Hernia, diaphragmatic  No date: History of bladder infections  No date: History of bronchitis  No date: History of chicken pox  No date: History of pneumonia  No date: Irregular heart beat  No date: Lactose intolerance  No date: Lumbar radiculopathy  No date: Neuropathy  No date: No blood products      Comment:  PATIENT REQUESTS NO BLOOD PRODUCTS  No date: PTSD (post-traumatic stress disorder)  No date: RAD (reactive airway disease) (CMD)  No date: RBBB  No date: Refusal of blood transfusions as patient is Catholic  No date: Sjoegren syndrome  No date: Thyroid disease  5/31/2014: Unspecified asthma(493.90)  No date: Unspecified hypothyroidism  No date: Vitamin B 12 deficiency    Current Outpatient Medications:  predniSONE (DELTASONE) 20 MG tablet, Take 2 tablets by mouth daily for 4 days. Begin taking on August 3, 2024., Disp: 8 tablet, Rfl: 0  fluconazole (Diflucan) 150 MG  CPAP 2012    Sleep apnea    CPAP titration; weight reduction, CPAP 15 cm of water, aboidance of respiratory depressants including alcohol and sedatives    Visual impairment       Past Surgical History:   Procedure Laterality Date    Carpal tunnel release      Chemotherapy Right 1992    Cholecystectomy      Colonoscopy  05/25/2005    Perales's/constipation; EGD/colonoscopy;diverticulosis, internal hemorrhoids, gastritis, hiatal hernia, Perales's esophagus - continue PPI therapy     Colonoscopy  06/28/2011    change in bowel habits / Perales's; EGD colonoscopy biopsy; diverticula sigmoid colon, internal hemorrhoids, mild gastritis, histal hernia with reflux, mild esophagitis, no evidence of Perales's /  Mohamed Terra     Colonoscopy      Fracture surgery      General sleep study  05/09/2012    obesity, hypersomnolence snoring; sleep study; obstructive sleep apnea, favorable response to CPAP 15 cm of water / Yoseph Everett f/u 6/6/12    Angelito localization wire 1 site left (cpt=19281) Left 1993    pre ca excisional bx    Mastectomy right Right     Other surgical history      Radiation right Right 1992    Upper gi endoscopy,exam  2002    Perales's esophagus; EGD 12/10/2002; Arpit Ellison       Medications Prior to Admission   Medication Sig Dispense Refill Last Dose    GEMTESA 75 MG Oral Tab Take 1 tablet by mouth daily.   8/5/2024    fluticasone propionate 50 MCG/ACT Nasal Suspension 2 sprays by Nasal route daily.   8/5/2024    SYMBICORT 160-4.5 MCG/ACT Inhalation Aerosol Inhale 2 puffs into the lungs 2 (two) times daily.   8/5/2024    albuterol 108 (90 Base) MCG/ACT Inhalation Aero Soln Inhale 2 puffs into the lungs every 4 (four) hours as needed.   8/5/2024    multivitamin Oral Tab Take 1 tablet by mouth daily with breakfast.   8/5/2024    estradiol (ESTRACE) 0.1 MG/GM Vaginal Cream Apply 1/2 gram vaginally 2 times per week. Use at bedtime. 42.5 g 3 Past Week    Cholecalciferol (VITAMIN D) 50 MCG (2000 UT) Oral Cap Take 1  tablet, Take 1 tablet by mouth daily., Disp: 2 tablet, Rfl: 0  fluticasone (FLONASE) 50 MCG/ACT nasal spray, SHAKE LIQUID AND USE 1 SPRAY IN EACH NOSTRIL DAILY, Disp: 16 g, Rfl: 1  ibuprofen (MOTRIN) 800 MG tablet, TAKE 1 TABLET BY MOUTH EVERY 8 HOURS AS NEEDED FOR PAIN, Disp: 90 tablet, Rfl: 1  Fluticasone Furoate 50 MCG/ACT AEROSOL POWDER, BREATH ACTIVATED, Inhale 50 mcg into the lungs daily., Disp: 60 each, Rfl: 1  gabapentin (NEURONTIN) 600 MG tablet, TAKE 1/2 TABLET BY MOUTH EVERY MORNING AND 2 TABLETS EVERY EVENING, Disp: 90 tablet, Rfl: 1  apixaBAN (Eliquis) 2.5 MG Tab, Take 1 tablet by mouth in the morning and 1 tablet in the evening., Disp: 180 tablet, Rfl: 3  montelukast (SINGULAIR) 10 MG tablet, TAKE 1 TABLET BY MOUTH EVERY NIGHT, Disp: 90 tablet, Rfl: 1  levothyroxine 175 MCG tablet, TAKE 1 TABLET BY MOUTH DAILY, Disp: 90 tablet, Rfl: 1  dicyclomine (BENTYL) 10 MG capsule, Take 1 capsule by mouth 4 times daily (before meals and nightly)., Disp: 360 capsule, Rfl: 3  albuterol (Ventolin HFA) 108 (90 Base) MCG/ACT inhaler, Inhale 2 puffs into the lungs every 4 hours as needed for Shortness of Breath or Wheezing., Disp: 1 each, Rfl: 3  methylPREDNISolone (MEDROL DOSEPAK) 4 MG tablet, follow package directions, Disp: 1 packet, Rfl: 0  diclofenac (VOLTAREN) 1 % gel, Apply 4 g topically as needed (Moderate to severe foot pain.)., Disp: 150 g, Rfl: 1  omeprazole (PriLOSEC) 40 MG capsule, TAKE 1 CAPSULE BY MOUTH IN THE MORNING AND IN THE EVENING, Disp: 180 capsule, Rfl: 1  ferrous sulfate 325 (65 FE) MG tablet, Take 1 tablet by mouth daily (with breakfast)., Disp: 90 tablet, Rfl: 1  cholecalciferol (VITAMIN D) 1.25 mg(50,000 units) capsule, Take 1 capsule by mouth 1 day a week., Disp: 12 capsule, Rfl: 0  cetirizine (ZyrTEC) 10 MG tablet, TAKE 1 TABLET BY MOUTH EVERY DAY, Disp: 90 tablet, Rfl: 1  sertraline (ZOLOFT) 50 MG tablet, Take 50 mg by mouth daily., Disp: , Rfl:   PreviDent 5000 Sensitive 1.1-5 % Gel, USE  ONCE A DAY. AVOID EATING OR DRINKING FOR 30 MINUTES FOLLOWING TREATMENT., Disp: , Rfl:   cycloSPORINE (RESTASIS) 0.05 % ophthalmic emulsion, Place 1 drop into both eyes in the morning and 1 drop in the evening., Disp: , Rfl:   clonazePAM (KlonoPIN) 1 MG tablet, Take 1 mg by mouth as needed for Anxiety., Disp: , Rfl:   hydroCORTisone (Anusol-HC) 2.5 % rectal cream, Place 1 application. rectally in the morning and 1 application. in the evening., Disp: 30 g, Rfl: 0  hyoscyamine (LEVSIN SL) 0.125 MG sublingual tablet, Place 1 tablet under the tongue every 6 hours as needed for Cramping., Disp: 50 tablet, Rfl: 3  magnesium oxide (MAG-OX) 400 (240 Mg) MG tablet, Take 400 mg by mouth daily., Disp: , Rfl:   polyethylene glycol (MIRALAX) 17 GM/SCOOP powder, Take 17 g by mouth daily. Stir and dissolve powder in any 4 to 8 ounces of beverage, then drink., Disp: 510 g, Rfl: 0  ondansetron (ZOFRAN ODT) 4 MG disintegrating tablet, Place 1 tablet onto the tongue every 8 hours as needed for Nausea., Disp: 12 tablet, Rfl: 0  OLANZapine (ZyPREXA) 5 MG tablet, Take by mouth 3 times daily., Disp: , Rfl:   traZODone (DESYREL) 100 MG tablet, Take 100 mg by mouth at bedtime., Disp: , Rfl:   triamcinolone (ARISTOCORT) 0.1 % cream, Apply topically 2 times daily. Apply twice daily to lesion on right lower lip, Disp: 30 g, Rfl: 1  acetaminophen (TYLENOL) 650 MG CR tablet, Take 1,300 mg by mouth every 8 hours as needed for Pain. Indications: Pain, Disp: , Rfl:   sertraline (ZOLOFT) 100 MG tablet, Take 100 mg by mouth daily. Takes 1.5 tabs, Disp: , Rfl:   clonazePAM (KlonoPIN) 0.5 MG tablet, Take 0.5 mg by mouth 4 times daily., Disp: , Rfl:   OLANZapine (ZyPREXA) 10 MG tablet, Take 10 mg by mouth nightly., Disp: , Rfl:   Multiple Vitamins-Minerals (MULTIVITAMIN PO), Take 1 tablet by mouth daily., Disp: , Rfl:   Ascorbic Acid (VITAMIN C PO), Take 1 tablet by mouth daily., Disp: , Rfl:   Probiotic Product (PROBIOTIC DAILY PO), Take 1 capsule by  capsule (2,000 Units total) by mouth daily.   8/4/2024    Vitamin B-12 (VITAMIN B12) 500 MCG Oral Tab Take 1 tablet (500 mcg total) by mouth daily.   8/4/2024    Multiple Vitamins-Minerals (MULTI FOR HER 50+) Oral Tab Take  by mouth daily.   8/5/2024    Vaginal Moisturizer (LUVENA PREBIOTIC LUBRICANT VA) Place  vaginally. Indications: One every four days, per pt's medication list   Past Week    Omeprazole 40 MG Oral Capsule Delayed Release Take 1 capsule (40 mg total) by mouth daily.   8/4/2024    aspirin 325 MG Oral Tab Take  by mouth.   8/4/2024    montelukast 10 MG Oral Tab Take by mouth.   8/5/2024    Calcium Carbonate-Vitamin D 600-200 MG-UNIT Oral Cap Take  by mouth.   8/4/2024    tiotropium 18 MCG Inhalation Cap Inhale into the lungs daily.       azithromycin 250 MG Oral Tab Take 1 tablet (250 mg total) by mouth daily.       Mirabegron ER (MYRBETRIQ) 8 MG/ML Oral Suspension Reconstituted ER Take by mouth.       PATIENT SUPPLIED MEDICATION 2L of O2 as needed        Outpatient Medications Marked as Taking for the 8/4/24 encounter (Hospital Encounter)   Medication Sig Dispense Refill    GEMTESA 75 MG Oral Tab Take 1 tablet by mouth daily.      fluticasone propionate 50 MCG/ACT Nasal Suspension 2 sprays by Nasal route daily.      SYMBICORT 160-4.5 MCG/ACT Inhalation Aerosol Inhale 2 puffs into the lungs 2 (two) times daily.      albuterol 108 (90 Base) MCG/ACT Inhalation Aero Soln Inhale 2 puffs into the lungs every 4 (four) hours as needed.      multivitamin Oral Tab Take 1 tablet by mouth daily with breakfast.      estradiol (ESTRACE) 0.1 MG/GM Vaginal Cream Apply 1/2 gram vaginally 2 times per week. Use at bedtime. 42.5 g 3    Cholecalciferol (VITAMIN D) 50 MCG (2000 UT) Oral Cap Take 1 capsule (2,000 Units total) by mouth daily.      Vitamin B-12 (VITAMIN B12) 500 MCG Oral Tab Take 1 tablet (500 mcg total) by mouth daily.      Multiple Vitamins-Minerals (MULTI FOR HER 50+) Oral Tab Take  by mouth daily.       Vaginal Moisturizer (LUVENA PREBIOTIC LUBRICANT VA) Place  vaginally. Indications: One every four days, per pt's medication list      Omeprazole 40 MG Oral Capsule Delayed Release Take 1 capsule (40 mg total) by mouth daily.      aspirin 325 MG Oral Tab Take  by mouth.      montelukast 10 MG Oral Tab Take by mouth.      Calcium Carbonate-Vitamin D 600-200 MG-UNIT Oral Cap Take  by mouth.        Allergies   Allergen Reactions    Sulfa Antibiotics FEVER    Adhesive Tape     Levaquin [Levofloxacin] RASH    Morphine NAUSEA ONLY     Other reaction(s): \"doesn't agree with me\"      Social History     Socioeconomic History    Marital status:    Tobacco Use    Smoking status: Former    Smokeless tobacco: Former    Tobacco comments:     quit 25 yrs ago   Vaping Use    Vaping status: Never Used   Substance and Sexual Activity    Alcohol use: Yes     Alcohol/week: 2.5 standard drinks of alcohol     Types: 3 Standard drinks or equivalent per week     Comment: 3 drinks daily    Drug use: No     Social Determinants of Health     Food Insecurity: No Food Insecurity (8/5/2024)    Food Insecurity     Food Insecurity: Never true   Transportation Needs: No Transportation Needs (8/5/2024)    Transportation Needs     Lack of Transportation: No   Housing Stability: Low Risk  (8/5/2024)    Housing Stability     Housing Instability: No       Family History   Problem Relation Age of Onset    Heart Disease Other         grandfather    Stroke Other         aunt    Breast Cancer Sister 78        approx    Breast Cancer Self 46        approx    Breast Cancer Paternal Cousin Female 78        approx    Heart Disorder Father     Diabetes Mother          Exam:  Vitals:    08/05/24 0948 08/05/24 1234 08/05/24 1235 08/05/24 1300   BP: 118/47 112/49     BP Location: Left arm Left arm     Pulse: 71 81     Resp: 20 19     Temp: 97.6 °F (36.4 °C) 97.8 °F (36.6 °C)     TempSrc: Oral Oral     SpO2: 98% 98% 97% 97%   Weight:       Height:      mouth daily. , Disp: , Rfl:     No current facility-administered medications for this encounter.        SUBJECTIVE                                                                                                             Patient presents to fluoro suite by self. She reports frequent recurrent pneumonia since last fall, 2023. 4 round of pna since last fall. She does notice some coughing after drinking liquids sometimes. Sometimes solids feel like they are stuck. Has acid reflux. Feels her PPI isn't working. Still getting some breakthrough acid reflux symptoms especially at night.   Function:  - Current functional limitations / exacerbation factors: difficulty with swallowing.  - Prior level of function: no concerns with swallowing.  Patient/Caregiver Goals: Evaluate oropharyngeal swallow function    Prior treatment: inpatient speech  Discharged from hospital, home health, or skilled nursing facility in last 30 days: yes  Home Environment:   Patient lives with: significant other  Assistance available: as needed  Feel safe at home/work/school: Feels safe at home/work/school.  Denies 2 or more falls or an unexplained fall with injury in the last year.    OBJECTIVE                                                                                                                           Oral Mechanism   Cranial Nerve Exam - Speech & Swallow  Intact: Trigeminal Nerve, Glossopharyngeal/Vagus Nerve, Facial Nerve and Hypoglossal Nerve    - Dentition: intact    Video Fluoroscopy  Numbers listed with consistency indicate IDDSI diet level.  Completed in lateral view unless otherwise stated.     Thin (0); cup; self fed; single swallow    - Oral Phase: intact    - Pharyngeal Phase: impaired        Swallow Initiation: valleculae        Residue: mild base of tongue and trace pyriform sinuses    - Penetration:        Amount: trace and mild, Timing: during the swallow        Able to clear: yes    - Penetration Aspiration Scale: 2 -      General: no apparent distress, conversant  Skin: no rash, ulcers or subcutaneous nodules  Eyes: anicteric sclerae, moist conjunctivae  Head, ears, nose, throat: atraumatic, oropharynx clear with moist mucous membranes  Neck: trachea midline with no thyromegaly  Heart: regular rate and rhythm, no murmurs / rubs / gallops  Lungs: clear bilaterally, normal respiratory effort, no accessory muscle use  Chest: chest tube in place  Extremities: no edema or cyanosis  Psych: interactive, answering questions appropriately, appropriate affect    Labs:  Reviewed in EMR    Inpatient Medications:  Reviewed in EMR    Imaging:   Chest imaging reviewed     material enters the airway, remains above the vocal folds and is ejected from the airway    Thin (0); cup; sequential swallow; self fed    - Oral Phase: intact    - Pharyngeal Phase: impaired        Swallow Initiation: valleculae        Residue: mild base of tongue and trace pyriform sinuses    - Penetration:  trace and mild, Timing: during the swallow        Able to clear: yes    - Aspiration: trace, Timing: during the swallow        Able to clear: no        Response: silent    - Penetration Aspiration Scale: 8 - material enters the airway, passes below the vocal folds, and no effort is made to eject    Pureed (4); teaspoon; self fed    - Oral Phase: intact    - Pharyngeal Phase: impaired        Swallow Initiation: base of tongue        Residue: mild base of tongue, Clearance: independently cleared    - Penetration:         Amount: none    - Aspiration:         Amount: none    - Penetration Aspiration Scale: 1 - material does not enter the airway    Regular; self fed    - Oral Phase: intact    - Pharyngeal Phase: impaired        Swallow Initiation: base of tongue        Residue: mild base of tongue, Clearance: liquid wash    - Penetration:         Amount: none    - Aspiration:         Amount: none    - Penetration Aspiration Scale: 1 - material does not enter the airway    Thin (0); cup; sequential swallow; self fed (as liquid wash)    - Oral Phase: intact    - Pharyngeal Phase: impaired        Swallow Initiation: valleculae        Residue: mild base of tongue    - Aspiration:         Amount: trace, Timing: during the swallow        Able to clear: no        Response: silent    - Penetration Aspiration Scale: 8 - material enters the airway, passes below the vocal folds, and no effort is made to eject    Thin (0); straw; self fed    - Oral Phase: intact    - Pharyngeal Phase: impaired        Swallow Initiation: valleculae        Residue: mild base of tongue    - Aspiration:         Amount: trace and mild, Timing:  during the swallow        Able to clear: no        Response: silent    - Penetration Aspiration Scale: 8 - material enters the airway, passes below the vocal folds, and no effort is made to eject    Physiological Findings  Oral    - Labial Closure: intact    - Lingual Function: intact    - Mastication: intact  Pharyngeal    - Pharyngeal Swallow Response: delayed and timely    - Soft Palate Elevation: intact    - Base of Tongue Retraction: intact    - Epiglottic Inversion: reduced    - Hyolaryngeal Elevation: reduced    - Anterior Hyoid Excursion: reduced    - Posterior Pharyngeal Wall Contraction: absent     - Upper Esophageal Sphincter (UES) Opening: intact  Copy of Study Stored In: PACS  Results Discussed With: patient                 ASSESSMENT                                                                                                           Patient presents with concerns of recurrent pneumonia since last fall. Based on today's evaluation, patient presents with pharyngeal dysphagia characterized by reduced hyolaryngeal movement and epiglottic inverstion. Observed silent aspiration of thin liquids by sequential sip, straw, and liquid wash. Transient penetration observed with thin liquids via single sip by cup.     Patient was educated on the results of today's evaluation. Recommend thin liquids by cup, single sip with effortful swallow. Regular solids. Recommend a trial of swallowing therapy to target pharyngeal deficits. Patient is in agreement with this plan. Acid reflux could also be contributing to her recurrent pneumonia if she is having more reflux at night there is potential for aspiration of reflux which could be contributing. Consider referral to GI or follow up with PCP given that she is still having breakthrough acid reflux despite taking PPI. Patient is in stable condition at end of study.   Education:   - Results of above outlined education: Verbalizes understanding and Demonstrates  understanding  Recommendations  NPO: No  NPO with Alternative Feeding: No  P.O. Diet Consistency: Liquid- Thin and Regular  Strategies/Guidelines: Small bites/sips, Effortful Swallow  Level of Supervision: None Required  Swallow Therapy: Yes  Repeat Videofluoroscopy: Yes  Consults: GI/PCP - will defer to referring MD    GOALS                                                                                                                           Long Term Goals: to be met by end of plan of care  1. Patient will participate in a video swallow study evaluation in order to assess oropharyngeal swallow function.     Status: met  2. Patient will perform 3 sets of 15 repetitions of CTAR  and Mendelsohn  exercises given min cues in order to improve hyoid excursion and laryngeal elevation and clearance through pharynx for improved safety and efficiency of swallowing function.  3. Patient will perform 3 sets of 30 repetitions of Effortful Swallow exercises given min cues in order to improve hyoid excursion and laryngeal elevation and clearance through pharynx for improved safety and efficiency of swallowing function.      Therapy procedure time and total treatment time can be found documented on the Time Entry flowsheet

## 2024-08-06 NOTE — PROGRESS NOTES
[facility]  part of [facility]    Progress Note    Annmarie Levin Patient Status:  Inpatient    1945 MRN [id number]   Location [facility]5W Attending Carlos Dominguez MD   Hosp Day # 1 PCP LLOYD ANDREWS MD     Chief Complaint:     Pneumothorax    Subjective:   Subjective:    Patient seen and examined  Endorsing SOB  No fevers chills.   Respiratory status at baseline oxygen requirements 2L  Nausesous    Objective:   Blood pressure 122/57, pulse 85, temperature 97.5 °F (36.4 °C), temperature source Oral, resp. rate 20, height 5' 2\" (1.575 m), weight 189 lb 6.4 oz (85.9 kg), last menstrual period 10/18/1991, SpO2 93%.  Physical Exam    General: Patient is alert and oriented x3 does not appear to be in acute distress at this time  HEENT: EOMI PERRLA, atraumatic normocephalic  Cardiac: S1-S2 appreciated  Lungs: diminished air entry R lung.  Abdomen: Soft nontender nondistended positive bowel sounds  Ext: Peripheral pulses are positive  Neuro: No focal deficits noted  Psych: Normal mood  Skin: No rashes noted  MSK: Full range of motion intact      Results:   Lab Results   Component Value Date    WBC 5.1 2024    HGB 10.7 (L) 2024    HCT 32.4 (L) 2024    .0 2024    CREATSERUM 0.68 2024    BUN 12 2024     2024    K 4.3 2024     2024    CO2 29.0 2024     (H) 2024    CA 9.2 2024    ALB 3.6 2023    ALKPHO 67 2023    BILT 0.5 2023    TP 6.7 2023    AST 21 2023    ALT 18 2023    TSH 1.550 2023    DDIMER 0.81 (H) 2024    MG 1.8 2024    PHOS 3.3 2024    TROPHS 9 2024    B12 1,120 (H) 2022       XR CHEST AP PORTABLE  (CPT=71045)    Result Date: 2024  CONCLUSION:  1. Cardiomegaly.  Tortuous thoracic aorta. 2. Diffuse chronic pulmonary interstitial prominence.  Chronic right apical pleural parenchymal scarring with known  traction bronchiectasis in the right apex.  Persistent small right apical pneumothorax measuring 14 mm.  Follow-up to resolution to exclude bronchopleural fistula. 3. Right apical percutaneous catheter remains in place.    Dictated by (CST): Earl Barber MD on 8/06/2024 at 9:42 AM     Finalized by (CST): Earl Barber MD on 8/06/2024 at 9:47 AM          XR CHEST AP PORTABLE  (CPT=71045)    Result Date: 8/5/2024  CONCLUSION:  1. Cardiomegaly.  Tortuous thoracic aorta.  Mediastinal configuration unchanged 2. Diffuse chronic pulmonary interstitial prominence redemonstrated. 3. Small right apical pneumothorax measuring less than 10% with right apical percutaneous pigtail catheter in the pleural space.  Chronic right apical pleural parenchymal scarring with traction bronchiectasis noted on prior chest CT from 9/22/2023.  4. Recommend follow-up to resolution to exclude bronchopleural fistula.    Dictated by (CST): Earl Barber MD on 8/05/2024 at 11:38 AM     Finalized by (CST): Earl Barber MD on 8/05/2024 at 11:45 AM          XR CHEST AP PORTABLE  (CPT=71045)    Result Date: 8/5/2024  CONCLUSION:  1. Right-sided pigtail chest tube in place with re-expansion of right lung. 2. Chronically elevated right hemidiaphragm. 3. Right apical pleural parenchymal scarring. 4. Atherosclerotic calcification aorta. 5. Right mastectomy and axillary dissection.  1.   Dictated by (CST): Roland Wallace MD on 8/05/2024 at 11:39 AM     Finalized by (CST): Roland Wallace MD on 8/05/2024 at 11:43 AM          XR CHEST AP PORTABLE  (CPT=71045)    Result Date: 8/5/2024  CONCLUSION:  1. Large right pneumothorax.  No major discrepancy with preliminary [facility] radiology report.  Dictated by (CST): Roland Wallace MD on 8/05/2024 at 10:41 AM     Finalized by (CST): Roland Wallace MD on 8/05/2024 at 10:43 AM         EKG 12 Lead    Result Date: 8/5/2024  Normal sinus rhythm Incomplete right bundle branch block Left anterior  fascicular block Moderate voltage criteria for LVH, may be normal variant ( R in aVL , [place] product ) Septal infarct , age undetermined Abnormal ECG No previous ECGs found in [facility] Confirmed by SHAY ALEX, ALEXANDER ([id number]) on 8/5/2024 6:13:55 PM     Assessment & Plan:      Spontaneous pneumothorax  -Chest tube to underwater seal, pulmonary consulted, will use Norco/Toradol as needed for pain control.  Continue supplemental O2     COPD  -Start DuoNebs every 6 hours and as needed on Solu-Medrol 40 mg IV twice daily considering possibility of mild exacerbation triggering increased cough     Obesity with obstructive sleep apnea  -BMI 34.  Patient counseled regarding diet and excise, consider CPAP at night.     Prophylaxis  -Subcutaneous heparin     CODE STATUS  Full     Global A/P  -repeat CXR persistent 14 mm PTX  -appreciate Pulm recs - Chest tube to suction  -steroids change to PO steroids.  -ddimer elevated.  -CT reviewed  -charleen monitor closely.  -Reviewed previous consultant notes  -Reviewed CBC, BMP, Mag, and Phos  -Reviewed tests ordered  -Repeat labs in am  -MDM: High, severe exacerbation of chronic illness posing a threat to life. IV medications requiring close inpatient monitoring.         Carlos Dominguez MD

## 2024-08-07 ENCOUNTER — APPOINTMENT (OUTPATIENT)
Dept: GENERAL RADIOLOGY | Facility: HOSPITAL | Age: 79
End: 2024-08-07
Attending: INTERNAL MEDICINE
Payer: MEDICARE

## 2024-08-07 LAB
ANION GAP SERPL CALC-SCNC: 4 MMOL/L (ref 0–18)
BASOPHILS # BLD AUTO: 0.02 X10(3) UL (ref 0–0.2)
BASOPHILS NFR BLD AUTO: 0.3 %
BUN BLD-MCNC: 17 MG/DL (ref 9–23)
BUN/CREAT SERPL: 20.7 (ref 10–20)
CALCIUM BLD-MCNC: 9.6 MG/DL (ref 8.7–10.4)
CHLORIDE SERPL-SCNC: 104 MMOL/L (ref 98–112)
CO2 SERPL-SCNC: 30 MMOL/L (ref 21–32)
CREAT BLD-MCNC: 0.82 MG/DL
DEPRECATED RDW RBC AUTO: 59.7 FL (ref 35.1–46.3)
EGFRCR SERPLBLD CKD-EPI 2021: 74 ML/MIN/1.73M2 (ref 60–?)
EOSINOPHIL # BLD AUTO: 0.03 X10(3) UL (ref 0–0.7)
EOSINOPHIL NFR BLD AUTO: 0.5 %
ERYTHROCYTE [DISTWIDTH] IN BLOOD BY AUTOMATED COUNT: 16 % (ref 11–15)
GLUCOSE BLD-MCNC: 102 MG/DL (ref 70–99)
HCT VFR BLD AUTO: 32.8 %
HGB BLD-MCNC: 11 G/DL
IMM GRANULOCYTES # BLD AUTO: 0.02 X10(3) UL (ref 0–1)
IMM GRANULOCYTES NFR BLD: 0.3 %
LYMPHOCYTES # BLD AUTO: 1.11 X10(3) UL (ref 1–4)
LYMPHOCYTES NFR BLD AUTO: 17.1 %
MAGNESIUM SERPL-MCNC: 1.9 MG/DL (ref 1.6–2.6)
MCH RBC QN AUTO: 34.3 PG (ref 26–34)
MCHC RBC AUTO-ENTMCNC: 33.5 G/DL (ref 31–37)
MCV RBC AUTO: 102.2 FL
MONOCYTES # BLD AUTO: 0.93 X10(3) UL (ref 0.1–1)
MONOCYTES NFR BLD AUTO: 14.3 %
NEUTROPHILS # BLD AUTO: 4.39 X10 (3) UL (ref 1.5–7.7)
NEUTROPHILS # BLD AUTO: 4.39 X10(3) UL (ref 1.5–7.7)
NEUTROPHILS NFR BLD AUTO: 67.5 %
OSMOLALITY SERPL CALC.SUM OF ELEC: 288 MOSM/KG (ref 275–295)
PHOSPHATE SERPL-MCNC: 3.5 MG/DL (ref 2.4–5.1)
PLATELET # BLD AUTO: 245 10(3)UL (ref 150–450)
POTASSIUM SERPL-SCNC: 4.5 MMOL/L (ref 3.5–5.1)
RBC # BLD AUTO: 3.21 X10(6)UL
SODIUM SERPL-SCNC: 138 MMOL/L (ref 136–145)
WBC # BLD AUTO: 6.5 X10(3) UL (ref 4–11)

## 2024-08-07 PROCEDURE — 71045 X-RAY EXAM CHEST 1 VIEW: CPT | Performed by: INTERNAL MEDICINE

## 2024-08-07 PROCEDURE — 99233 SBSQ HOSP IP/OBS HIGH 50: CPT | Performed by: INTERNAL MEDICINE

## 2024-08-07 RX ORDER — IPRATROPIUM BROMIDE AND ALBUTEROL SULFATE 2.5; .5 MG/3ML; MG/3ML
3 SOLUTION RESPIRATORY (INHALATION)
Status: DISCONTINUED | OUTPATIENT
Start: 2024-08-08 | End: 2024-08-08

## 2024-08-07 NOTE — PROGRESS NOTES
[facility]  part of [facility] Hospitalist Progress Note     Annmarie Levin Patient Status:  Inpatient    1945 MRN [id number]   Location [facility]5W Attending Chay Adams MD   Hosp Day # 2 PCP LLOYD ANDREWS MD     Chief Complaint:   Chief Complaint   Patient presents with    Shortness Of Breath    Chest Pain Angina        Subjective:     Patient seen sitting in chair.  No family at bedside.  Patient reported some increased shortness of breath and coughing overnight.  Patient states she is feeling better today.  Shortness of breath improving.    Objective:      Vital signs:  Vitals:    24 2237 24 0032 24 0548 24 0902   BP:  144/58 135/54 123/51   BP Location:  Left arm Left arm Left arm   Pulse:  89 82 90   Resp: 18 18 18 18   Temp:  97.6 °F (36.4 °C) 97.5 °F (36.4 °C) 97.3 °F (36.3 °C)   TempSrc:  Oral Oral Tympanic   SpO2: 92% 97% 95% 92%   Weight:       Height:           Intake/Output Summary (Last 24 hours) at 2024 0915  Last data filed at 2024 0902  Gross per 24 hour   Intake 1180 ml   Output 47 ml   Net 1133 ml           Physical Exam:    GENERAL:  Awake and alert, in no acute distress.  CHEST: Right-sided chest tube in place.  HEART:  Regular rhythm, regular rate  LUNGS:  Air entry was decreased, worse over the right.  No increased work of breathing or wheezes   ABDOMEN: Soft and non-tender.    PSYCHIATRIC: Normal mood    Diagnostic Data:    Labs:    Recent Labs   Lab 24   WBC 6.7 5.1 6.5   HGB 11.1* 10.7* 11.0*   .9* 102.2* 102.2*   .0 253.0 245.0       Recent Labs   Lab 24   * 118* 102*   BUN 12 12 17   CREATSERUM 0.71 0.68 0.82   CA 10.0 9.2 9.6    136 138   K 4.1 4.3 4.5    106 104   CO2 30.0 29.0 30.0           Estimated Creatinine Clearance: 45.4 mL/min (based on SCr of 0.82 mg/dL).    No results for  input(s): \"PTP\", \"INR\" in the last 168 hours.         COVID-19  Lab Results   Component Value Date    COVID19 Not Detected 10/14/2022       Pro-Calcitonin  No results for input(s): \"PCT\" in the last 168 hours.    Cardiac  No results for input(s): \"TROP\", \"PBNP\" in the last 168 hours.    Inflammatory Markers  Recent Labs   Lab 08/04/24 2011   DDIMER 0.81*       Culture:  No results found for this visit on 08/04/24.    XR CHEST AP PORTABLE  (CPT=71045)    Result Date: 8/6/2024  CONCLUSION:  1. Cardiomegaly.  Tortuous thoracic aorta. 2. Diffuse chronic pulmonary interstitial prominence.  Chronic right apical pleural parenchymal scarring with known traction bronchiectasis in the right apex.  Persistent small right apical pneumothorax measuring 14 mm.  Follow-up to resolution to exclude bronchopleural fistula. 3. Right apical percutaneous catheter remains in place.    Dictated by (CST): Earl Barber MD on 8/06/2024 at 9:42 AM     Finalized by (CST): Earl Barber MD on 8/06/2024 at 9:47 AM          XR CHEST AP PORTABLE  (CPT=71045)    Result Date: 8/5/2024  CONCLUSION:  1. Cardiomegaly.  Tortuous thoracic aorta.  Mediastinal configuration unchanged 2. Diffuse chronic pulmonary interstitial prominence redemonstrated. 3. Small right apical pneumothorax measuring less than 10% with right apical percutaneous pigtail catheter in the pleural space.  Chronic right apical pleural parenchymal scarring with traction bronchiectasis noted on prior chest CT from 9/22/2023.  4. Recommend follow-up to resolution to exclude bronchopleural fistula.    Dictated by (CST): Earl Barber MD on 8/05/2024 at 11:38 AM     Finalized by (CST): Earl Barber MD on 8/05/2024 at 11:45 AM          XR CHEST AP PORTABLE  (CPT=71045)    Result Date: 8/5/2024  CONCLUSION:  1. Right-sided pigtail chest tube in place with re-expansion of right lung. 2. Chronically elevated right hemidiaphragm. 3. Right apical pleural parenchymal  scarring. 4. Atherosclerotic calcification aorta. 5. Right mastectomy and axillary dissection.  1.   Dictated by (CST): Roland Wallace MD on 8/05/2024 at 11:39 AM     Finalized by (CST): Roland Wallace MD on 8/05/2024 at 11:43 AM          XR CHEST AP PORTABLE  (CPT=71045)    Result Date: 8/5/2024  CONCLUSION:  1. Large right pneumothorax.  No major discrepancy with preliminary [facility] radiology report.  Dictated by (CST): Roland Wallace MD on 8/05/2024 at 10:41 AM     Finalized by (CST): Roland Wallace MD on 8/05/2024 at 10:43 AM                 Medications:    predniSONE  40 mg Oral Daily with breakfast    montelukast  10 mg Oral Nightly    pantoprazole  40 mg Oral QAM AC    fluticasone-salmeterol  1 puff Inhalation BID    heparin  5,000 Units Subcutaneous 2 times per day    ipratropium-albuterol  3 mL Nebulization 4 times per day       Assessment & Plan:      Spontaneous pneumothorax  -Status post chest tube placement.    -Pulmonology managing, continue to suction.    -intermittent air leak noted.    -Repeat chest x-ray reviewed.  Noted persistence of right upper lobe pneumothorax.  Describing it as 17 mm currently, mild/faint increased from previous.  -Continue supplemental O2  -Pain control as needed, close monitoring with narcotics  -Appreciate further recommendations from pulmonology.     COPD with possible exacerbation  -Initially treated with IV Solu-Medrol, transitioning to p.o. steroids.  -Continue DuoNebs as needed   -Continue home inhalers.  -Pulmonology on consult, appreciate further recommendation.      Obesity with obstructive sleep apnea  -BMI 34.    -CPAP deferred due to PTX    Chronic respiratory failure  -Patient on intermittent supplemental O2 at home  -Mostly with exertion  -Continue supplemental O2      Plan of care discussed with patient at bedside . Discussed management/test result(s) with Rn    Quality:  DVT Prophylaxis: Heparin  CODE status: Full  Estimated date of discharge: TBD  Discharge  is dependent on: clinical stability    54 minutes spent discussing with other providers, examining patient, obtaining history, reviewing medical records, interpreting and communicating test results/imaging, ordering tests/medications, discussing plan of care and documenting information.      Chay Adams MD          This note was prepared using Dragon Medical voice recognition dictation software. As a result errors may occur. When identified these errors have been corrected. While every attempt is made to correct errors during dictation discrepancies may still exist

## 2024-08-07 NOTE — SPIRITUAL CARE NOTE
Spiritual Care Visit Note    Patient Name: Annmarie Levin Date of Spiritual Care Visit: 24   : 1945 Primary Dx: Pneumothorax, unspecified type       Referred By: Referral From: Care Coordination    Spiritual Care Taxonomy:    Intended Effects: Demonstrate caring and concern    Methods: Collaborate with care team member;Offer support    Interventions: Active listening;Ask guided questions;Discuss concerns    Visit Type/Summary:     - Spiritual Care: Consulted with RN prior to visit. Offered empathic listening and emotional support. Patient and family expressed appreciation for  visit. Provided information regarding how to contact Spiritual Care and left a Spiritual Care information card.    Spiritual Care support can be requested via an [employer] consult. For urgent/immediate needs, please contact the On Call  at: [place]: ext [phone]    JOSEPH Scott CAMJAINCE   [id number]

## 2024-08-07 NOTE — PROGRESS NOTES
[facility] Pulmonary, Critical Care and Sleep    Annmarie Boaz Patient Status:  Inpatient    1945 MRN [id number]   Location [facility]5W Attending Chay Adams MD   Hosp Day # 2 PCP LLOYD ANDREWS MD     Date of Admission: 2024  7:47 PM    Admission Diagnosis: Pneumothorax, unspecified type [J93.9]    S: Had coughing episode overnight. No chest pain.     Scheduled Medications:     predniSONE  40 mg Oral Daily with breakfast    montelukast  10 mg Oral Nightly    pantoprazole  40 mg Oral QAM AC    fluticasone-salmeterol  1 puff Inhalation BID    heparin  5,000 Units Subcutaneous 2 times per day    ipratropium-albuterol  3 mL Nebulization 4 times per day       Infusing Medications:      PRN Medications:    ondansetron    acetaminophen    HYDROcodone-acetaminophen    hydrALAzine    ipratropium-albuterol    zolpidem    alum-mag hydroxide-simethicone    HYDROmorphone    polyethylene glycol (PEG 3350)    OBJECTIVE:  /51 (BP Location: Left arm)   Pulse 90   Temp 97.3 °F (36.3 °C) (Tympanic)   Resp 18   Ht 157.5 cm (5' 2\")   Wt 189 lb 6.4 oz (85.9 kg)   LMP 10/18/1991 (Approximate)   SpO2 92%   BMI 34.64 kg/m²    Temp (24hrs), Av.6 °F (36.4 °C), Min:97.3 °F (36.3 °C), Max:97.8 °F (36.6 °C)             Wt Readings from Last 3 Encounters:   24 189 lb 6.4 oz (85.9 kg)   24 186 lb (84.4 kg)   24 191 lb (86.6 kg)       I/O last 3 completed shifts:  In: 1100 [P.O.:1100]  Out: 72 [Chest Tube:72]  I/O this shift:  In: 460 [P.O.:450; I.V.:10]  Out: 0      O2: 2 LNC  General: NAD.   Neuro: Alert, no focal deficits.    HEENT: PERRL  Neck : No LAD  CV: RRR, nl S1, S2, no S4, S3 or murmur.   Lungs: Clear bilaterally. Air leak on pleuevac.   Abd: Nontender, non distended.   Ext: No edema.   Skin: No rashes.     Recent Labs   Lab 24  0443 24  0453 24  0457   WBC 6.7 5.1 6.5   HGB 11.1* 10.7* 11.0*   HCT 33.0* 32.4* 32.8*   .0 253.0 245.0     Recent Labs    Lab 08/04/24 2011 08/05/24  0443 08/07/24  0457   * 118* 102*   BUN 12 12 17   CREATSERUM 0.71 0.68 0.82   CA 10.0 9.2 9.6    136 138   K 4.1 4.3 4.5    106 104   CO2 30.0 29.0 30.0     No results for input(s): \"INR\", \"PTT\" in the last 168 hours.  No results for input(s): \"ABGPHT\", \"MYSPOW9V\", \"CZEVR8V\", \"ABGHCO3\", \"SITE\", \"DEV\", \"THGB\" in the last 168 hours.    COVID-19 Lab Results    COVID-19  Lab Results   Component Value Date    COVID19 Not Detected 10/14/2022       Pro-Calcitonin  No results for input(s): \"PCT\" in the last 168 hours.    Cardiac  No results for input(s): \"TROP\", \"PBNP\" in the last 168 hours.    Creatinine Kinase  No results for input(s): \"CK\" in the last 168 hours.    Inflammatory Markers  Recent Labs   Lab 08/04/24 2011   DDIMER 0.81*       Imaging:   CXR 8/7/24:  1. Cardiomegaly.  Tortuous thoracic aorta.   2. Diffuse chronic pulmonary interstitial prominence.  Chronic right apical pleural parenchymal abnormality with known traction bronchiectasis in the right apex.   3. Persistent small right apical pneumothorax measuring 17 mm with slight progression.  Percutaneous pigtail catheter drain in place.  Follow-up to resolution to exclude bronchopleural fistula.       Chest images personally reviewed.   Assessment/Plan   PTX - has known RUL scarring  - R sided chest tube placed 8/4.   - continue chest tube to suction. Continuous air leak yesterday- now intermittent  If persists could consider VATS, pleurodesis or endobronchial valve.   Chronic respiratory failure  - at baseline uses supplemental O2 at 2 LPM as needed  COPD - possible exacerbation  -PO steroids  - continue home inhalers  NADJA  - CPAP deferred due to PTX  Dispo  - will follow    My best regards,         Robert Leal MD  [facility] Pulmonary, Critical Care and Sleep Medicine

## 2024-08-07 NOTE — PLAN OF CARE
Pt A&Ox4. Tele in place. Chest tube remains to suction, bubbling subsided, occurs at times. Norco given for pain relief. Cpap on hold for now until pneumothorax resolve per md notes. Bed low and locked. Call light within reach  Problem: Patient Centered Care  Goal: Patient preferences are identified and integrated in the patient's plan of care  Description: Interventions:  - What would you like us to know as we care for you? From home alone  - Provide timely, complete, and accurate information to patient/family  - Incorporate patient and family knowledge, values, beliefs, and cultural backgrounds into the planning and delivery of care  - Encourage patient/family to participate in care and decision-making at the level they choose  - Honor patient and family perspectives and choices  Outcome: Progressing     Problem: Patient/Family Goals  Goal: Patient/Family Long Term Goal  Description: Patient's Long Term Goal: discharge    Interventions:  - - Monitor vitals  - Monitor appropriate labs  - Pain management  - Follow MD order  - Diagnostics per order  - Update/Informing patient and family on plan of care  - Discharge planning  - See additional Care Plan goals for specific interventions  Outcome: Progressing  Goal: Patient/Family Short Term Goal  Description: Patient's Short Term Goal: feel better    Interventions:   - - Monitor vitals  - Monitor appropriate labs  - Pain management  - Follow MD order  - Diagnostics per order  - Update/Informing patient and family on plan of care  - Discharge planning  - See additional Care Plan goals for specific interventions  Outcome: Progressing     Problem: PAIN - ADULT  Goal: Verbalizes/displays adequate comfort level or patient's stated pain goal  Description: INTERVENTIONS:  - Encourage pt to monitor pain and request assistance  - Assess pain using appropriate pain scale  - Administer analgesics based on type and severity of pain and evaluate response  - Implement  non-pharmacological measures as appropriate and evaluate response  - Consider cultural and social influences on pain and pain management  - Manage/alleviate anxiety  - Utilize distraction and/or relaxation techniques  - Monitor for opioid side effects  - Notify MD/LIP if interventions unsuccessful or patient reports new pain  - Anticipate increased pain with activity and pre-medicate as appropriate  Outcome: Progressing     Problem: CARDIOVASCULAR - ADULT  Goal: Maintains optimal cardiac output and hemodynamic stability  Description: INTERVENTIONS:  - Monitor vital signs, rhythm, and trends  - Monitor for bleeding, hypotension and signs of decreased cardiac output  - Evaluate effectiveness of vasoactive medications to optimize hemodynamic stability  - Monitor arterial and/or venous puncture sites for bleeding and/or hematoma  - Assess quality of pulses, skin color and temperature  - Assess for signs of decreased coronary artery perfusion - ex. Angina  - Evaluate fluid balance, assess for edema, trend weights  Outcome: Progressing     Problem: RESPIRATORY - ADULT  Goal: Achieves optimal ventilation and oxygenation  Description: INTERVENTIONS:  - Assess for changes in respiratory status  - Assess for changes in mentation and behavior  - Position to facilitate oxygenation and minimize respiratory effort  - Oxygen supplementation based on oxygen saturation or ABGs  - Provide Smoking Cessation handout, if applicable  - Encourage broncho-pulmonary hygiene including cough, deep breathe, Incentive Spirometry  - Assess the need for suctioning and perform as needed  - Assess and instruct to report SOB or any respiratory difficulty  - Respiratory Therapy support as indicated  - Manage/alleviate anxiety  - Monitor for signs/symptoms of CO2 retention  Outcome: Progressing

## 2024-08-07 NOTE — PLAN OF CARE
A&Ox4. Pt ambulates independently,voiding independently, tolerating regular diet, on 2L via nasal canula. Frequent rounding by nursing staff. Safety precautions maintained/call light within reach. Plan for dc with residential home health pending medical clearance.    Problem: Patient Centered Care  Goal: Patient preferences are identified and integrated in the patient's plan of care  Description: Interventions:  - What would you like us to know as we care for you? From home alone  - Provide timely, complete, and accurate information to patient/family  - Incorporate patient and family knowledge, values, beliefs, and cultural backgrounds into the planning and delivery of care  - Encourage patient/family to participate in care and decision-making at the level they choose  - Honor patient and family perspectives and choices  Outcome: Progressing     Problem: Patient/Family Goals  Goal: Patient/Family Long Term Goal  Description: Patient's Long Term Goal: discharge    Interventions:  - - Monitor vitals  - Monitor appropriate labs  - Pain management  - Follow MD order  - Diagnostics per order  - Update/Informing patient and family on plan of care  - Discharge planning  - See additional Care Plan goals for specific interventions  Outcome: Progressing  Goal: Patient/Family Short Term Goal  Description: Patient's Short Term Goal: feel better    Interventions:   - - Monitor vitals  - Monitor appropriate labs  - Pain management  - Follow MD order  - Diagnostics per order  - Update/Informing patient and family on plan of care  - Discharge planning  - See additional Care Plan goals for specific interventions  Outcome: Progressing     Problem: PAIN - ADULT  Goal: Verbalizes/displays adequate comfort level or patient's stated pain goal  Description: INTERVENTIONS:  - Encourage pt to monitor pain and request assistance  - Assess pain using appropriate pain scale  - Administer analgesics based on type and severity of pain and  evaluate response  - Implement non-pharmacological measures as appropriate and evaluate response  - Consider cultural and social influences on pain and pain management  - Manage/alleviate anxiety  - Utilize distraction and/or relaxation techniques  - Monitor for opioid side effects  - Notify MD/LIP if interventions unsuccessful or patient reports new pain  - Anticipate increased pain with activity and pre-medicate as appropriate  Outcome: Progressing     Problem: CARDIOVASCULAR - ADULT  Goal: Maintains optimal cardiac output and hemodynamic stability  Description: INTERVENTIONS:  - Monitor vital signs, rhythm, and trends  - Monitor for bleeding, hypotension and signs of decreased cardiac output  - Evaluate effectiveness of vasoactive medications to optimize hemodynamic stability  - Monitor arterial and/or venous puncture sites for bleeding and/or hematoma  - Assess quality of pulses, skin color and temperature  - Assess for signs of decreased coronary artery perfusion - ex. Angina  - Evaluate fluid balance, assess for edema, trend weights  Outcome: Progressing     Problem: RESPIRATORY - ADULT  Goal: Achieves optimal ventilation and oxygenation  Description: INTERVENTIONS:  - Assess for changes in respiratory status  - Assess for changes in mentation and behavior  - Position to facilitate oxygenation and minimize respiratory effort  - Oxygen supplementation based on oxygen saturation or ABGs  - Provide Smoking Cessation handout, if applicable  - Encourage broncho-pulmonary hygiene including cough, deep breathe, Incentive Spirometry  - Assess the need for suctioning and perform as needed  - Assess and instruct to report SOB or any respiratory difficulty  - Respiratory Therapy support as indicated  - Manage/alleviate anxiety  - Monitor for signs/symptoms of CO2 retention  Outcome: Progressing

## 2024-08-08 ENCOUNTER — APPOINTMENT (OUTPATIENT)
Dept: GENERAL RADIOLOGY | Facility: HOSPITAL | Age: 79
End: 2024-08-08
Attending: INTERNAL MEDICINE
Payer: MEDICARE

## 2024-08-08 ENCOUNTER — APPOINTMENT (OUTPATIENT)
Dept: INTERVENTIONAL RADIOLOGY/VASCULAR | Facility: HOSPITAL | Age: 79
End: 2024-08-08
Attending: INTERNAL MEDICINE
Payer: MEDICARE

## 2024-08-08 ENCOUNTER — APPOINTMENT (OUTPATIENT)
Dept: CT IMAGING | Facility: HOSPITAL | Age: 79
End: 2024-08-08
Attending: STUDENT IN AN ORGANIZED HEALTH CARE EDUCATION/TRAINING PROGRAM
Payer: MEDICARE

## 2024-08-08 LAB
ANION GAP SERPL CALC-SCNC: 4 MMOL/L (ref 0–18)
BASOPHILS # BLD AUTO: 0.01 X10(3) UL (ref 0–0.2)
BASOPHILS NFR BLD AUTO: 0.2 %
BUN BLD-MCNC: 14 MG/DL (ref 9–23)
BUN/CREAT SERPL: 21.9 (ref 10–20)
CALCIUM BLD-MCNC: 9.3 MG/DL (ref 8.7–10.4)
CHLORIDE SERPL-SCNC: 104 MMOL/L (ref 98–112)
CO2 SERPL-SCNC: 31 MMOL/L (ref 21–32)
CREAT BLD-MCNC: 0.64 MG/DL
DEPRECATED RDW RBC AUTO: 59.3 FL (ref 35.1–46.3)
EGFRCR SERPLBLD CKD-EPI 2021: 91 ML/MIN/1.73M2 (ref 60–?)
EOSINOPHIL # BLD AUTO: 0.01 X10(3) UL (ref 0–0.7)
EOSINOPHIL NFR BLD AUTO: 0.2 %
ERYTHROCYTE [DISTWIDTH] IN BLOOD BY AUTOMATED COUNT: 16 % (ref 11–15)
GLUCOSE BLD-MCNC: 115 MG/DL (ref 70–99)
HCT VFR BLD AUTO: 31.8 %
HGB BLD-MCNC: 10.5 G/DL
IMM GRANULOCYTES # BLD AUTO: 0.01 X10(3) UL (ref 0–1)
IMM GRANULOCYTES NFR BLD: 0.2 %
LYMPHOCYTES # BLD AUTO: 0.71 X10(3) UL (ref 1–4)
LYMPHOCYTES NFR BLD AUTO: 14.9 %
MCH RBC QN AUTO: 33.9 PG (ref 26–34)
MCHC RBC AUTO-ENTMCNC: 33 G/DL (ref 31–37)
MCV RBC AUTO: 102.6 FL
MONOCYTES # BLD AUTO: 0.65 X10(3) UL (ref 0.1–1)
MONOCYTES NFR BLD AUTO: 13.6 %
NEUTROPHILS # BLD AUTO: 3.39 X10 (3) UL (ref 1.5–7.7)
NEUTROPHILS # BLD AUTO: 3.39 X10(3) UL (ref 1.5–7.7)
NEUTROPHILS NFR BLD AUTO: 70.9 %
OSMOLALITY SERPL CALC.SUM OF ELEC: 289 MOSM/KG (ref 275–295)
PLATELET # BLD AUTO: 247 10(3)UL (ref 150–450)
POTASSIUM SERPL-SCNC: 4.6 MMOL/L (ref 3.5–5.1)
RBC # BLD AUTO: 3.1 X10(6)UL
SODIUM SERPL-SCNC: 139 MMOL/L (ref 136–145)
WBC # BLD AUTO: 4.8 X10(3) UL (ref 4–11)

## 2024-08-08 PROCEDURE — 71260 CT THORAX DX C+: CPT | Performed by: STUDENT IN AN ORGANIZED HEALTH CARE EDUCATION/TRAINING PROGRAM

## 2024-08-08 PROCEDURE — 71045 X-RAY EXAM CHEST 1 VIEW: CPT | Performed by: INTERNAL MEDICINE

## 2024-08-08 PROCEDURE — 0W9930Z DRAINAGE OF RIGHT PLEURAL CAVITY WITH DRAINAGE DEVICE, PERCUTANEOUS APPROACH: ICD-10-PCS | Performed by: RADIOLOGY

## 2024-08-08 PROCEDURE — 99233 SBSQ HOSP IP/OBS HIGH 50: CPT | Performed by: INTERNAL MEDICINE

## 2024-08-08 RX ORDER — DOCUSATE SODIUM 100 MG/1
100 CAPSULE, LIQUID FILLED ORAL 2 TIMES DAILY
Status: DISCONTINUED | OUTPATIENT
Start: 2024-08-08 | End: 2024-08-12

## 2024-08-08 RX ORDER — POLYETHYLENE GLYCOL 3350 17 G/17G
17 POWDER, FOR SOLUTION ORAL DAILY PRN
Status: DISCONTINUED | OUTPATIENT
Start: 2024-08-08 | End: 2024-08-12

## 2024-08-08 RX ORDER — ENEMA 19; 7 G/133ML; G/133ML
1 ENEMA RECTAL ONCE AS NEEDED
Status: DISCONTINUED | OUTPATIENT
Start: 2024-08-08 | End: 2024-08-12

## 2024-08-08 RX ORDER — MIDAZOLAM HYDROCHLORIDE 1 MG/ML
INJECTION INTRAMUSCULAR; INTRAVENOUS
Status: COMPLETED
Start: 2024-08-08 | End: 2024-08-08

## 2024-08-08 RX ORDER — IPRATROPIUM BROMIDE AND ALBUTEROL SULFATE 2.5; .5 MG/3ML; MG/3ML
3 SOLUTION RESPIRATORY (INHALATION)
Status: DISCONTINUED | OUTPATIENT
Start: 2024-08-08 | End: 2024-08-13

## 2024-08-08 RX ORDER — BISACODYL 10 MG
10 SUPPOSITORY, RECTAL RECTAL
Status: DISCONTINUED | OUTPATIENT
Start: 2024-08-08 | End: 2024-08-12

## 2024-08-08 RX ORDER — LIDOCAINE HYDROCHLORIDE 20 MG/ML
INJECTION, SOLUTION EPIDURAL; INFILTRATION; INTRACAUDAL; PERINEURAL
Status: COMPLETED
Start: 2024-08-08 | End: 2024-08-08

## 2024-08-08 NOTE — PLAN OF CARE
Problem: Patient Centered Care  Goal: Patient preferences are identified and integrated in the patient's plan of care  Description: Interventions:  - What would you like us to know as we care for you? From home alone  - Provide timely, complete, and accurate information to patient/family  - Incorporate patient and family knowledge, values, beliefs, and cultural backgrounds into the planning and delivery of care  - Encourage patient/family to participate in care and decision-making at the level they choose  - Honor patient and family perspectives and choices  Outcome: Progressing     Problem: Patient/Family Goals  Goal: Patient/Family Long Term Goal  Description: Patient's Long Term Goal: discharge    Interventions:  - - Monitor vitals  - Monitor appropriate labs  - Pain management  - Follow MD order  - Diagnostics per order  - Update/Informing patient and family on plan of care  - Discharge planning  - See additional Care Plan goals for specific interventions  Outcome: Progressing  Goal: Patient/Family Short Term Goal  Description: Patient's Short Term Goal: feel better    Interventions:   - - Monitor vitals  - Monitor appropriate labs  - Pain management  - Follow MD order  - Diagnostics per order  - Update/Informing patient and family on plan of care  - Discharge planning  - See additional Care Plan goals for specific interventions  Outcome: Progressing     Problem: PAIN - ADULT  Goal: Verbalizes/displays adequate comfort level or patient's stated pain goal  Description: INTERVENTIONS:  - Encourage pt to monitor pain and request assistance  - Assess pain using appropriate pain scale  - Administer analgesics based on type and severity of pain and evaluate response  - Implement non-pharmacological measures as appropriate and evaluate response  - Consider cultural and social influences on pain and pain management  - Manage/alleviate anxiety  - Utilize distraction and/or relaxation techniques  - Monitor for opioid  side effects  - Notify MD/LIP if interventions unsuccessful or patient reports new pain  - Anticipate increased pain with activity and pre-medicate as appropriate  Outcome: Progressing     Problem: CARDIOVASCULAR - ADULT  Goal: Maintains optimal cardiac output and hemodynamic stability  Description: INTERVENTIONS:  - Monitor vital signs, rhythm, and trends  - Monitor for bleeding, hypotension and signs of decreased cardiac output  - Evaluate effectiveness of vasoactive medications to optimize hemodynamic stability  - Monitor arterial and/or venous puncture sites for bleeding and/or hematoma  - Assess quality of pulses, skin color and temperature  - Assess for signs of decreased coronary artery perfusion - ex. Angina  - Evaluate fluid balance, assess for edema, trend weights  Outcome: Progressing     Problem: RESPIRATORY - ADULT  Goal: Achieves optimal ventilation and oxygenation  Description: INTERVENTIONS:  - Assess for changes in respiratory status  - Assess for changes in mentation and behavior  - Position to facilitate oxygenation and minimize respiratory effort  - Oxygen supplementation based on oxygen saturation or ABGs  - Provide Smoking Cessation handout, if applicable  - Encourage broncho-pulmonary hygiene including cough, deep breathe, Incentive Spirometry  - Assess the need for suctioning and perform as needed  - Assess and instruct to report SOB or any respiratory difficulty  - Respiratory Therapy support as indicated  - Manage/alleviate anxiety  - Monitor for signs/symptoms of CO2 retention  Outcome: Progressing

## 2024-08-08 NOTE — PLAN OF CARE
Pt A&Ox4. Chest tube in place to continuous suction .output monitored as ordered. Norco given for pain control. Pt on 2L O2 nasal cannula. Bed low and locked. Fall precautions in place  Problem: Patient Centered Care  Goal: Patient preferences are identified and integrated in the patient's plan of care  Description: Interventions:  - What would you like us to know as we care for you? From home alone  - Provide timely, complete, and accurate information to patient/family  - Incorporate patient and family knowledge, values, beliefs, and cultural backgrounds into the planning and delivery of care  - Encourage patient/family to participate in care and decision-making at the level they choose  - Honor patient and family perspectives and choices  Outcome: Progressing     Problem: Patient/Family Goals  Goal: Patient/Family Long Term Goal  Description: Patient's Long Term Goal: discharge    Interventions:  - - Monitor vitals  - Monitor appropriate labs  - Pain management  - Follow MD order  - Diagnostics per order  - Update/Informing patient and family on plan of care  - Discharge planning  - See additional Care Plan goals for specific interventions  Outcome: Progressing  Goal: Patient/Family Short Term Goal  Description: Patient's Short Term Goal: feel better    Interventions:   - - Monitor vitals  - Monitor appropriate labs  - Pain management  - Follow MD order  - Diagnostics per order  - Update/Informing patient and family on plan of care  - Discharge planning  - See additional Care Plan goals for specific interventions  Outcome: Progressing     Problem: PAIN - ADULT  Goal: Verbalizes/displays adequate comfort level or patient's stated pain goal  Description: INTERVENTIONS:  - Encourage pt to monitor pain and request assistance  - Assess pain using appropriate pain scale  - Administer analgesics based on type and severity of pain and evaluate response  - Implement non-pharmacological measures as appropriate and evaluate  response  - Consider cultural and social influences on pain and pain management  - Manage/alleviate anxiety  - Utilize distraction and/or relaxation techniques  - Monitor for opioid side effects  - Notify MD/LIP if interventions unsuccessful or patient reports new pain  - Anticipate increased pain with activity and pre-medicate as appropriate  Outcome: Progressing     Problem: CARDIOVASCULAR - ADULT  Goal: Maintains optimal cardiac output and hemodynamic stability  Description: INTERVENTIONS:  - Monitor vital signs, rhythm, and trends  - Monitor for bleeding, hypotension and signs of decreased cardiac output  - Evaluate effectiveness of vasoactive medications to optimize hemodynamic stability  - Monitor arterial and/or venous puncture sites for bleeding and/or hematoma  - Assess quality of pulses, skin color and temperature  - Assess for signs of decreased coronary artery perfusion - ex. Angina  - Evaluate fluid balance, assess for edema, trend weights  Outcome: Progressing     Problem: RESPIRATORY - ADULT  Goal: Achieves optimal ventilation and oxygenation  Description: INTERVENTIONS:  - Assess for changes in respiratory status  - Assess for changes in mentation and behavior  - Position to facilitate oxygenation and minimize respiratory effort  - Oxygen supplementation based on oxygen saturation or ABGs  - Provide Smoking Cessation handout, if applicable  - Encourage broncho-pulmonary hygiene including cough, deep breathe, Incentive Spirometry  - Assess the need for suctioning and perform as needed  - Assess and instruct to report SOB or any respiratory difficulty  - Respiratory Therapy support as indicated  - Manage/alleviate anxiety  - Monitor for signs/symptoms of CO2 retention  Outcome: Progressing

## 2024-08-08 NOTE — PROGRESS NOTES
Pulmonary Progress Note     Assessment / Plan:  PTX - has known RUL scarring  - chest tube dislodged on CXR this morning  - given ongoing air leak prior to chest tube being dislodged will need to have chest tube replaced. 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 No new complaints.     Objective:  Vitals:    08/08/24 0749 08/08/24 0803 08/08/24 1017 08/08/24 1230   BP: 134/67   145/61   BP Location: Left arm   Left arm   Pulse: 79 101  90   Resp: 20   16   Temp: 97.5 °F (36.4 °C)   97.9 °F (36.6 °C)   TempSrc: Oral   Oral   SpO2: 92%  97% 90%   Weight:       Height:         Physical Exam:  General: no apparent distress, conversant  Skin: no rash, ulcers or subcutaneous nodules  Eyes: anicteric sclerae, moist conjunctivae  Head, ears, nose, throat: atraumatic, oropharynx clear with moist mucous membranes  Neck: trachea midline with no thyromegaly  Heart: regular rate and rhythm, no murmurs / rubs / gallops  Lungs: clear bilaterally, normal respiratory effort, no accessory muscle use  Chest: chest tube in place  Extremities: no edema or cyanosis  Psych: interactive, answering questions appropriately, appropriate affect    Medications:  Reviewed in EMR    Lab Data:  Reviewed in EMR    Imaging:  I independently visualized all relevant chest imaging in PACS and agree with radiology interpretation except where noted.

## 2024-08-08 NOTE — CONSULTS
Thoracic Surgery Consult Note     Name: Annmarie Levin   Age: 78 year old   Sex: female.   MRN: [id number]    Reason for Consultation: right pneumothorax     Consulting Physician: Dr. Leal    Subjective:     Chief Complaint: \"My breathing got worse\"     History of Present Illness:   Ms. Levin is a 78 year old female presenting with a right pneumothorax.     Patient had a cough for a few days. On Sunday, she developed sudden onset, severe, sharp, right axillary pain with associated shortness of breath. She has home oxygen for \"flying\" and activity, but when she checked her oxygenation, she was in the 70s. Typically, it only drops to the mid 80s. No fevers or chills. Patient presented to the ED on 8/4/24. CXR showed a large right pneumothorax. A chest tube was then placed on 8/4 with reexpansion of the lung. Her breathing improved immediately following chest tube placement. On CXR this morning, the chest tube was dislodged with enlarging pneumothorax so plan is for IR chest tube replacement today. Per patient, her breathing feels like it back at her baseline.     PMH includes breast cancer, COPD, Perales's, and sleep apnea. No prior pneumothoraces. She is undergoing surveillance CT scans for a mediastinal mass. She takes . No history of heart disease. No prior thoracic surgeries. She has a family history of breast cancer.     Review Of Systems:   10 point review of systems was conducted and was negative except for the pertinent positives listed in the above HPI.    Past Medical History:   Past Medical History:    Arrhythmia    hx of rapid heartbeat     Perales's esophagus    Perales's esophagus    path consistent; EGD 07/17/2007; Mohamed Sait    Breast cancer ([facility])    1992 - [id number];  Mastectomy/Chemo/Radiation.,right breast    Breast cancer of upper-outer quadrant of right female breast ([facility])    1992 - [id number];  Mastectomy/Chemo/Radiation     COPD (chronic obstructive pulmonary disease) ([facility])     Environmental allergies    Esophageal reflux    History of stomach ulcers    hx    Hyperlipidemia    Malignant neoplasm of upper-outer quadrant of right breast in female, estrogen receptor negative (HCC)    1992 - [id number];  Mastectomy/Chemo/Radiation     Mixed hyperlipidemia    Organic cardiac disease    Osteoarthritis    bilateral knee    Osteoarthritis of left knee    Synvisc-One injection, Mark Johnson    Osteoarthritis of right foot    steroid injection of subtalar joint of the right foot under fluoroscopy guidance; FreddieedgarMark    Osteoarthritis of right knee    Synvisc-One, Mark Frazieredgar    Osteopenia of multiple sites    PONV (postoperative nausea and vomiting)    Rosacea    Skin cancer    multiple carcinomas of the skin    Sleep apnea    CPAP 2012    Sleep apnea    CPAP titration; weight reduction, CPAP 15 cm of water, aboidance of respiratory depressants including alcohol and sedatives    Visual impairment       Past Surgical History:   Past Surgical History:   Procedure Laterality Date    Carpal tunnel release      Chemotherapy Right 1992    Cholecystectomy      Colonoscopy  05/25/2005    Perales's/constipation; EGD/colonoscopy;diverticulosis, internal hemorrhoids, gastritis, hiatal hernia, Perales's esophagus - continue PPI therapy     Colonoscopy  06/28/2011    change in bowel habits / Perales's; EGD colonoscopy biopsy; diverticula sigmoid colon, internal hemorrhoids, mild gastritis, histal hernia with reflux, mild esophagitis, no evidence of Perales's /  Mohamed Sait     Colonoscopy      Fracture surgery      General sleep study  05/09/2012    obesity, hypersomnolence snoring; sleep study; obstructive sleep apnea, favorable response to CPAP 15 cm of water / Yoseph Everett f/u 6/6/12    Angelito localization wire 1 site left (cpt=19281) Left 1993    pre ca excisional bx    Mastectomy right Right     Other surgical history      Radiation right Right 1992    Upper gi endoscopy,exam  2002    Perales's esophagus;  HARLEEN 12/10/2002; Arpit Ellison       Social History:   Social History     Socioeconomic History    Marital status:      Spouse name: Not on file    Number of children: Not on file    Years of education: Not on file    Highest education level: Not on file   Occupational History    Not on file   Tobacco Use    Smoking status: Former    Smokeless tobacco: Former    Tobacco comments:     quit 25 yrs ago   Vaping Use    Vaping status: Never Used   Substance and Sexual Activity    Alcohol use: Yes     Alcohol/week: 2.5 standard drinks of alcohol     Types: 3 Standard drinks or equivalent per week     Comment: 3 drinks daily    Drug use: No    Sexual activity: Not on file   Other Topics Concern    Not on file   Social History Narrative    Not on file     Social Determinants of Health     Financial Resource Strain: Not on file   Food Insecurity: No Food Insecurity (8/5/2024)    Food Insecurity     Food Insecurity: Never true   Transportation Needs: No Transportation Needs (8/5/2024)    Transportation Needs     Lack of Transportation: No     Car Seat: Not on file   Physical Activity: Not on file   Stress: Not on file   Social Connections: Not on file   Housing Stability: Low Risk  (8/5/2024)    Housing Stability     Housing Instability: No     Housing Instability Emergency: Not on file     Crib or Bassinette: Not on file       Family History:   Family History   Problem Relation Age of Onset    Heart Disease Other         grandfather    Stroke Other         aunt    Breast Cancer Sister 78        approx    Breast Cancer Self 46        approx    Breast Cancer Paternal Cousin Female 78        approx    Heart Disorder Father     Diabetes Mother        Allergies:  Allergies   Allergen Reactions    Sulfa Antibiotics FEVER    Adhesive Tape     Levaquin [Levofloxacin] RASH    Morphine NAUSEA ONLY     Other reaction(s): \"doesn't agree with me\"       Medications:   No current facility-administered medications on file prior to  encounter.     Current Outpatient Medications on File Prior to Encounter   Medication Sig Dispense Refill    GEMTESA 75 MG Oral Tab Take 1 tablet by mouth daily.      fluticasone propionate 50 MCG/ACT Nasal Suspension 2 sprays by Nasal route daily.      SYMBICORT 160-4.5 MCG/ACT Inhalation Aerosol Inhale 2 puffs into the lungs 2 (two) times daily.      albuterol 108 (90 Base) MCG/ACT Inhalation Aero Soln Inhale 2 puffs into the lungs every 4 (four) hours as needed.      multivitamin Oral Tab Take 1 tablet by mouth daily with breakfast.      estradiol (ESTRACE) 0.1 MG/GM Vaginal Cream Apply 1/2 gram vaginally 2 times per week. Use at bedtime. 42.5 g 3    Cholecalciferol (VITAMIN D) 50 MCG (2000 UT) Oral Cap Take 1 capsule (2,000 Units total) by mouth daily.      Vitamin B-12 (VITAMIN B12) 500 MCG Oral Tab Take 1 tablet (500 mcg total) by mouth daily.      Multiple Vitamins-Minerals (MULTI FOR HER 50+) Oral Tab Take  by mouth daily.      Vaginal Moisturizer (LUVENA PREBIOTIC LUBRICANT VA) Place  vaginally. Indications: One every four days, per pt's medication list      Omeprazole 40 MG Oral Capsule Delayed Release Take 1 capsule (40 mg total) by mouth daily.      aspirin 325 MG Oral Tab Take  by mouth.      montelukast 10 MG Oral Tab Take by mouth.      Calcium Carbonate-Vitamin D 600-200 MG-UNIT Oral Cap Take  by mouth.      tiotropium 18 MCG Inhalation Cap Inhale into the lungs daily.      azithromycin 250 MG Oral Tab Take 1 tablet (250 mg total) by mouth daily.      Mirabegron ER (MYRBETRIQ) 8 MG/ML Oral Suspension Reconstituted ER Take by mouth.      PATIENT SUPPLIED MEDICATION 2L of O2 as needed        ipratropium-albuterol (Duoneb) 0.5-2.5 (3) MG/3ML inhalation solution 3 mL  3 mL Nebulization Q6H WA    [COMPLETED] magnesium oxide (Mag-Ox) tab 400 mg  400 mg Oral Once    predniSONE (Deltasone) tab 40 mg  40 mg Oral Daily with breakfast    montelukast (Singulair) tab 10 mg  10 mg Oral Nightly    pantoprazole  (Protonix) DR tab 40 mg  40 mg Oral QAM AC    fluticasone-salmeterol (Advair Diskus) 500-50 MCG/ACT inhaler 1 puff  1 puff Inhalation BID    ondansetron (Zofran) 4 MG/2ML injection 4 mg  4 mg Intravenous Q6H PRN    acetaminophen (Tylenol) tab 650 mg  650 mg Oral Q6H PRN    HYDROcodone-acetaminophen (Norco)  MG per tab 1 tablet  1 tablet Oral Q4H PRN    hydrALAzine (Apresoline) 20 mg/mL injection 10 mg  10 mg Intravenous Q4H PRN    [Held by provider] heparin (Porcine) 5000 UNIT/ML injection 5,000 Units  5,000 Units Subcutaneous 2 times per day    ipratropium-albuterol (Duoneb) 0.5-2.5 (3) MG/3ML inhalation solution 3 mL  3 mL Nebulization Q6H PRN    zolpidem (Ambien) tab 5 mg  5 mg Oral Nightly PRN    alum-mag hydroxide-simethicone (Maalox) 200-200-20 MG/5ML oral suspension 30 mL  30 mL Oral QID PRN    [] ketorolac (Toradol) 15 MG/ML injection 15 mg  15 mg Intravenous Q6H PRN    HYDROmorphone (Dilaudid) 1 MG/ML injection 0.5 mg  0.5 mg Intravenous Q2H PRN    polyethylene glycol (PEG 3350) (Miralax) 17 g oral packet 17 g  17 g Oral Daily PRN    [COMPLETED] albuterol (Ventolin) (5 MG/ML) 0.5% nebulizer solution 10 mg  10 mg Nebulization Once    [COMPLETED] HYDROcodone-acetaminophen (Norco) 5-325 MG per tab 1 tablet  1 tablet Oral Once         Objective:      Vital Signs:  /67 (BP Location: Left arm)   Pulse 101   Temp 97.5 °F (36.4 °C) (Oral)   Resp 20   Ht 157.5 cm (5' 2\")   Wt 189 lb 6.4 oz (85.9 kg)   LMP 10/18/1991 (Approximate)   SpO2 92%   BMI 34.64 kg/m²     Physical Exam:  General: well appearing female in no acute distress  HEENT: Normocephalic, PERRL, EOMI, no scleral icterus  Neck: Supple, trachea midline, no JVD, no masses. Thyroid not grossly enlarged  Nodes: no cervical or supraclavicular lymphadenopathy appreciated  Heart: regular rate and rhythm. No murmurs, rubs or gallops. No lower extremity edema.  Lungs: Normal respiratory effort. Chest tube without air leak.   Abdomen:  Soft, Non-tender, non-distended. No hepatosplenomegaly noted.  Extremities: No clubbing or cyanosis. No lateralizing weakness  Neuro: No gross cranial nerve defects, no loss of sensation  Psych:  oriented to person place and time, normal mood and affect      Labs:   Lab Results   Component Value Date/Time    WBC 4.8 08/08/2024 04:46 AM    HGB 10.5 (L) 08/08/2024 04:46 AM    HCT 31.8 (L) 08/08/2024 04:46 AM    .0 08/08/2024 04:46 AM    .6 (H) 08/08/2024 04:46 AM     Lab Results   Component Value Date/Time     08/08/2024 04:46 AM    K 4.6 08/08/2024 04:46 AM     08/08/2024 04:46 AM    CO2 31.0 08/08/2024 04:46 AM    BUN 14 08/08/2024 04:46 AM     (H) 08/08/2024 04:46 AM    CA 9.3 08/08/2024 04:46 AM    MG 1.9 08/07/2024 04:57 AM    PHOS 3.5 08/07/2024 04:57 AM     No results found for: \"INR\", \"PT\", \"PTT\"  No components found for: \"TROPI\"  Lab Results   Component Value Date/Time    ALB 3.6 08/21/2023 11:22 AM    TP 6.7 08/21/2023 11:22 AM    ALT 18 08/21/2023 11:22 AM    AST 21 08/21/2023 11:22 AM         Review of Data:   CXR 8/4/24  FINDINGS:  CARDIAC/VASC: Atherosclerotic calcification aorta. Heart size and pulmonary vascularity normal.  MEDIAST/SORAYA:   No visible mass or adenopathy.  LUNGS/PLEURA: Large right-sided pneumothorax.  BONES: Stable bone island in the right anterior 2nd rib.  No displaced rib fracture.  OTHER: Postsurgical changes related to right mastectomy and axillary dissection.               Impression   CONCLUSION:  1. Large right pneumothorax.     No major discrepancy with preliminary [facility] radiology report.     CXR 8/4/24  CONCLUSION:   1. Right-sided pigtail chest tube in place with re-expansion of right lung.   2. Chronically elevated right hemidiaphragm.   3. Right apical pleural parenchymal scarring.   4. Atherosclerotic calcification aorta.   5. Right mastectomy and axillary dissection.     CXR 8/8/24  FINDINGS:  CARDIAC/VASC: Normal.  No cardiac silhouette  abnormality or cardiomegaly.  Unremarkable pulmonary vasculature.  MEDIAST/SORAYA:   No visible mass or adenopathy.  LUNGS/PLEURA: Interval increase in a moderate-sized 2.7 cm in height right apical pneumothorax.  The right pigtail chest tube has been pulled back and the side holes are now outside of the pleural space in the right lower chest.  Correlate clinically as  to intended location.  Slight interval increase in moderately extensive subcutaneous emphysema at the right chest wall and right lower neck.  Clips in the right axilla.  Minimal left basal scarring/atelectasis.  BONES: No fracture or visible bony lesion.  OTHER: Negative.                 Impression   CONCLUSION:  1. Interval increase in a moderate-sized now 2.7 cm in height right apical pneumothorax.  The right pigtail chest tube has been pulled back in the sideholes are now outside of the pleural space in the right lower chest.  Correlate clinically as to  intended location.  Slight interval increase in right-sided subcutaneous emphysema.  Follow-up study advised.       Assessment/Plan:     Ms. Levin is a 78 year old female presenting with a right pneumothorax.     Patient presented to the ED on 8/4/24 for worsening shortness of breath and pain. CXR showed a large right pneumothorax. A chest tube was then placed on 8/4 with reexpansion of the lung. On CXR this morning, the chest tube was dislodged with enlarging pneumothorax so plan is for IR chest tube replacement today. She is also on steroids for COPD exacerbation.     Agree with chest tube replacement. Will recheck her air leak once placed. Also recommend repeat CT chest given her history of a mediastinal mass. Her last scan was from 9/22/23 and the mass was stable at that time. However, surgical options would be affected if the mass increased in size.     -Continue with chest tube replacement as scheduled for today.   -Repeat CT chest     Patient discussed with Dr. Orona.     Shrerie Carter,  OLMAN  Thoracic Surgery    Patient seen and examined. I agree with above assessment and plan.    Ms. Levin has a continuous air leak despite several days of appropriate conservative management.  I gave her 3 options.  1 is to wait the air leak out in hopes that it will heal itself.  I gave her a time frame of \"days to weeks.\" 2nd is to do a beside talc pleurodesis.  Given the continuous nature of her air leak. I favor surgery but this could be tried.  3rd is surgery.  This would include finding the leaking lung tissue and resecting it while also doing a talc pleurodesis. Risks of this a continued air leak, ARDS, bleeding and infection.  I recommended surgery to Ms. Levin.  She would like to talk it over with family.  I will speak with her again tomorrow.    Brian Orona MD  Thoracic Surgery  Pager [phone]     I spent  60  minutes on this visit which included: review of tests, independently interpreting results, obtaining history, counseling on additional tests and procedures, communicating with the consulting physician,  care coordination and surgery, its risks and alternatives as described in the above assessment and plan and documenting in EMR.

## 2024-08-08 NOTE — PROGRESS NOTES
[facility]  part of [facility]  Progress Note      Annmarie Levin Patient Status:  Inpatient    1945 MRN [id number]   Location [facility]5W Attending Chay Adams MD   Hosp Day # 3 PCP LLOYD ANDREWS MD       Subjective:   Sitting up in chair    Objective:   A/ox3  Unlabored breathing  2L NC     Vital Signs:  Blood pressure 134/67, pulse 101, temperature 97.5 °F (36.4 °C), temperature source Oral, resp. rate 20, height 62\", weight 189 lb 6.4 oz (85.9 kg), last menstrual period 10/18/1991, SpO2 92%.    Input/Output:    Intake/Output Summary (Last 24 hours) at 2024 1013  Last data filed at 2024 0802  Gross per 24 hour   Intake 970 ml   Output 50 ml   Net 920 ml     Results:   Labs:  Lab Results   Component Value Date    WBC 4.8 2024    RBC 3.10 2024    HGB 10.5 2024    HCT 31.8 2024    .6 2024    MCH 33.9 2024    MCHC 33.0 2024    RDW 16.0 2024    .0 2024     Recent Labs   Lab 24  0443 24  0457 24  0446   * 102* 115*   BUN 12 17 14   CREATSERUM 0.68 0.82 0.64   EGFRCR 90 74 91   CA 9.2 9.6 9.3    138 139   K 4.3 4.5 4.6    104 104   CO2 29.0 30.0 31.0     XR CHEST AP PORTABLE  (CPT=71045)    Result Date: 2024  CONCLUSION:  1. Interval increase in a moderate-sized now 2.7 cm in height right apical pneumothorax.  The right pigtail chest tube has been pulled back in the sideholes are now outside of the pleural space in the right lower chest.  Correlate clinically as to intended location.  Slight interval increase in right-sided subcutaneous emphysema.  Follow-up study advised.    Dictated by (CST): Buck Jimenez MD on 2024 at 8:50 AM     Finalized by (CST): Buck Jimenez MD on 2024 at 8:54 AM          XR CHEST AP PORTABLE  (CPT=71045)    Result Date: 2024  CONCLUSION:  1. Cardiomegaly.  Tortuous thoracic aorta. 2. Diffuse chronic pulmonary  interstitial prominence.  Chronic right apical pleural parenchymal abnormality with known traction bronchiectasis in the right apex. 3. Persistent small right apical pneumothorax measuring 17 mm with slight progression.  Percutaneous pigtail catheter drain in place.  Follow-up to resolution to exclude bronchopleural fistula.    Dictated by (CST): Earl Barber MD on 8/07/2024 at 10:15 AM     Finalized by (CST): Earl Barber MD on 8/07/2024 at 10:18 AM             Assessment and Plan:   77yo female with COPD, spontaneous pneumothorax s/p chest tube 8/4  Present chest tube has been pulled back and there is an increased in right apical pneumothorax  Plan: new chest tube insertion with IR  Discussed procedure, risks, benefits with patient who agree to proceed        AUTUMN Castro  8/8/2024  10:13 AM

## 2024-08-08 NOTE — BRIEF PROCEDURE NOTE
[facility]  part of [facility]  Procedure Note    Annmarie Levin Patient Status:  Inpatient    1945 MRN [id number]   Location [facility] INTERVENTIONAL SUITES Attending Chay Adams MD   Hosp Day # 3 PCP LLOYD ANDREWS MD     Procedure: IR chest tube    Pre-Procedure Diagnosis:  recurrent pneumothorax    Post-Procedure Diagnosis: same    Anesthesia:  Sedation    Findings:  10 F small bore chest tube placed to lung apex, c near complete lung reexpansion    Specimens: 0    Blood Loss:  minimal      Complications:  None    Drains:  10 F APD      Robert Montoya MD  2024

## 2024-08-08 NOTE — PROGRESS NOTES
[facility]     Annmarie Levin Patient Status:  Inpatient    1945 MRN [id number]   Location [facility]5W Attending Chay Adams MD   Hosp Day # 3 PCP LLOYD ANDREWS MD     Chief Complaint:   Chief Complaint   Patient presents with    Shortness Of Breath    Chest Pain Angina        Subjective:     Patient seen sitting in chair.  No family at bedside.  Patient denied acute events overnight.  Patient reported she was feeling a little better today.  Shortness of breath improving.      Objective:      Vital signs:  Vitals:    24 0031 24 0541 24 0749 24 0803   BP: 134/67 133/62 134/67    BP Location: Left arm Left arm Left arm    Pulse: 91 82 79 101   Resp: 18 18 20    Temp: 98.1 °F (36.7 °C) 97.5 °F (36.4 °C) 97.5 °F (36.4 °C)    TempSrc: Oral Oral Oral    SpO2: 95% 94% 92%    Weight:       Height:           Intake/Output Summary (Last 24 hours) at 2024 0923  Last data filed at 2024 0802  Gross per 24 hour   Intake 970 ml   Output 50 ml   Net 920 ml           Physical Exam:    GENERAL:  Awake and alert, in no acute distress.  CHEST: Right-sided chest tube in place.  HEART:  Regular rhythm, regular rate  LUNGS:  Air entry was decreased, worse over the right.  No increased work of breathing or wheezes   ABDOMEN: Soft and non-tender.    PSYCHIATRIC: Normal mood    Diagnostic Data:    Labs:    Recent Labs   Lab 24   WBC 5.1 6.5 4.8   HGB 10.7* 11.0* 10.5*   .2* 102.2* 102.6*   .0 245.0 247.0       Recent Labs   Lab 24   * 102* 115*   BUN 12 17 14   CREATSERUM 0.68 0.82 0.64   CA 9.2 9.6 9.3    138 139   K 4.3 4.5 4.6    104 104   CO2 29.0 30.0 31.0           Estimated Creatinine Clearance: 58.2 mL/min (based on SCr of 0.64 mg/dL).    No results for input(s): \"PTP\", \"INR\" in the last  168 hours.         COVID-19  Lab Results   Component Value Date    COVID19 Not Detected 10/14/2022       Pro-Calcitonin  No results for input(s): \"PCT\" in the last 168 hours.    Cardiac  No results for input(s): \"TROP\", \"PBNP\" in the last 168 hours.    Inflammatory Markers  Recent Labs   Lab 08/04/24 2011   DDIMER 0.81*       Culture:  No results found for this visit on 08/04/24.    XR CHEST AP PORTABLE  (CPT=71045)    Result Date: 8/8/2024  CONCLUSION:  1. Interval increase in a moderate-sized now 2.7 cm in height right apical pneumothorax.  The right pigtail chest tube has been pulled back in the sideholes are now outside of the pleural space in the right lower chest.  Correlate clinically as to intended location.  Slight interval increase in right-sided subcutaneous emphysema.  Follow-up study advised.    Dictated by (CST): Buck Jimenez MD on 8/08/2024 at 8:50 AM     Finalized by (CST): Buck Jimenez MD on 8/08/2024 at 8:54 AM          XR CHEST AP PORTABLE  (CPT=71045)    Result Date: 8/7/2024  CONCLUSION:  1. Cardiomegaly.  Tortuous thoracic aorta. 2. Diffuse chronic pulmonary interstitial prominence.  Chronic right apical pleural parenchymal abnormality with known traction bronchiectasis in the right apex. 3. Persistent small right apical pneumothorax measuring 17 mm with slight progression.  Percutaneous pigtail catheter drain in place.  Follow-up to resolution to exclude bronchopleural fistula.    Dictated by (CST): Earl Barber MD on 8/07/2024 at 10:15 AM     Finalized by (CST): Earl Barber MD on 8/07/2024 at 10:18 AM          XR CHEST AP PORTABLE  (CPT=71045)    Result Date: 8/6/2024  CONCLUSION:  1. Cardiomegaly.  Tortuous thoracic aorta. 2. Diffuse chronic pulmonary interstitial prominence.  Chronic right apical pleural parenchymal scarring with known traction bronchiectasis in the right apex.  Persistent small right apical pneumothorax measuring 14 mm.  Follow-up to resolution to  exclude bronchopleural fistula. 3. Right apical percutaneous catheter remains in place.    Dictated by (CST): Earl Barber MD on 8/06/2024 at 9:42 AM     Finalized by (CST): Earl Barber MD on 8/06/2024 at 9:47 AM                 Medications:    ipratropium-albuterol  3 mL Nebulization Q6H WA    predniSONE  40 mg Oral Daily with breakfast    montelukast  10 mg Oral Nightly    pantoprazole  40 mg Oral QAM AC    fluticasone-salmeterol  1 puff Inhalation BID    heparin  5,000 Units Subcutaneous 2 times per day       Assessment & Plan:      Spontaneous pneumothorax  -Status post chest tube placement.    -Pulmonology managing, continue to suction.    -Repeat chest x-ray 8/8 reviewed.  Noted enlarging of right upper lobe pneumothorax.  Also described malposition of chest tube.    -Continue supplemental O2  -Pain control as needed, close monitoring with narcotics  -Discussed with pulmonology.  Recommending replacement of chest tube.  -If patient fails to respond to treatment, considering VATS procedure.  -Appreciate further recommendations from pulmonology.     COPD with possible exacerbation  -Initially treated with IV Solu-Medrol, transitioned to p.o. steroids.  -Continue DuoNebs as needed   -Continue home inhalers.  -Pulmonology on consult, appreciate further recommendation.      Obesity with obstructive sleep apnea  -BMI 34.    -CPAP deferred due to PTX    Chronic respiratory failure  -Patient on intermittent supplemental O2 at home  -Mostly with exertion  -Continue supplemental O2      Plan of care discussed with patient at bedside . Discussed management/test result(s) with Rn and pulmonology consult    Quality:  DVT Prophylaxis: Heparin  CODE status: Full  Estimated date of discharge: TBD  Discharge is dependent on: clinical stability    52 minutes spent discussing with other providers, examining patient, obtaining history, reviewing medical records, interpreting and communicating test results/imaging,  ordering tests/medications, discussing plan of care and documenting information.      Chay Adams MD          This note was prepared using Dragon Medical voice recognition dictation software. As a result errors may occur. When identified these errors have been corrected. While every attempt is made to correct errors during dictation discrepancies may still exist

## 2024-08-09 ENCOUNTER — APPOINTMENT (OUTPATIENT)
Dept: GENERAL RADIOLOGY | Facility: HOSPITAL | Age: 79
End: 2024-08-09
Attending: STUDENT IN AN ORGANIZED HEALTH CARE EDUCATION/TRAINING PROGRAM
Payer: MEDICARE

## 2024-08-09 ENCOUNTER — APPOINTMENT (OUTPATIENT)
Dept: INTERVENTIONAL RADIOLOGY/VASCULAR | Facility: HOSPITAL | Age: 79
End: 2024-08-09
Attending: NURSE PRACTITIONER
Payer: MEDICARE

## 2024-08-09 ENCOUNTER — APPOINTMENT (OUTPATIENT)
Dept: GENERAL RADIOLOGY | Facility: HOSPITAL | Age: 79
End: 2024-08-09
Attending: INTERNAL MEDICINE
Payer: MEDICARE

## 2024-08-09 LAB
ANION GAP SERPL CALC-SCNC: 3 MMOL/L (ref 0–18)
BASOPHILS # BLD AUTO: 0.01 X10(3) UL (ref 0–0.2)
BASOPHILS NFR BLD AUTO: 0.1 %
BUN BLD-MCNC: 14 MG/DL (ref 9–23)
BUN/CREAT SERPL: 19.4 (ref 10–20)
CALCIUM BLD-MCNC: 9.3 MG/DL (ref 8.7–10.4)
CHLORIDE SERPL-SCNC: 102 MMOL/L (ref 98–112)
CO2 SERPL-SCNC: 33 MMOL/L (ref 21–32)
CREAT BLD-MCNC: 0.72 MG/DL
DEPRECATED RDW RBC AUTO: 60.2 FL (ref 35.1–46.3)
EGFRCR SERPLBLD CKD-EPI 2021: 86 ML/MIN/1.73M2 (ref 60–?)
EOSINOPHIL # BLD AUTO: 0.06 X10(3) UL (ref 0–0.7)
EOSINOPHIL NFR BLD AUTO: 0.9 %
ERYTHROCYTE [DISTWIDTH] IN BLOOD BY AUTOMATED COUNT: 16.1 % (ref 11–15)
GLUCOSE BLD-MCNC: 113 MG/DL (ref 70–99)
HCT VFR BLD AUTO: 32.9 %
HGB BLD-MCNC: 10.9 G/DL
IMM GRANULOCYTES # BLD AUTO: 0.02 X10(3) UL (ref 0–1)
IMM GRANULOCYTES NFR BLD: 0.3 %
LYMPHOCYTES # BLD AUTO: 1.12 X10(3) UL (ref 1–4)
LYMPHOCYTES NFR BLD AUTO: 16.1 %
MCH RBC QN AUTO: 34 PG (ref 26–34)
MCHC RBC AUTO-ENTMCNC: 33.1 G/DL (ref 31–37)
MCV RBC AUTO: 102.5 FL
MONOCYTES # BLD AUTO: 0.83 X10(3) UL (ref 0.1–1)
MONOCYTES NFR BLD AUTO: 11.9 %
NEUTROPHILS # BLD AUTO: 4.91 X10 (3) UL (ref 1.5–7.7)
NEUTROPHILS # BLD AUTO: 4.91 X10(3) UL (ref 1.5–7.7)
NEUTROPHILS NFR BLD AUTO: 70.7 %
OSMOLALITY SERPL CALC.SUM OF ELEC: 287 MOSM/KG (ref 275–295)
PLATELET # BLD AUTO: 256 10(3)UL (ref 150–450)
POTASSIUM SERPL-SCNC: 4.4 MMOL/L (ref 3.5–5.1)
RBC # BLD AUTO: 3.21 X10(6)UL
SODIUM SERPL-SCNC: 138 MMOL/L (ref 136–145)
WBC # BLD AUTO: 7 X10(3) UL (ref 4–11)

## 2024-08-09 PROCEDURE — 0W29X0Z CHANGE DRAINAGE DEVICE IN RIGHT PLEURAL CAVITY, EXTERNAL APPROACH: ICD-10-PCS | Performed by: STUDENT IN AN ORGANIZED HEALTH CARE EDUCATION/TRAINING PROGRAM

## 2024-08-09 PROCEDURE — 71045 X-RAY EXAM CHEST 1 VIEW: CPT | Performed by: STUDENT IN AN ORGANIZED HEALTH CARE EDUCATION/TRAINING PROGRAM

## 2024-08-09 PROCEDURE — 71045 X-RAY EXAM CHEST 1 VIEW: CPT | Performed by: INTERNAL MEDICINE

## 2024-08-09 RX ORDER — MIDAZOLAM HYDROCHLORIDE 1 MG/ML
INJECTION INTRAMUSCULAR; INTRAVENOUS
Status: COMPLETED
Start: 2024-08-09 | End: 2024-08-09

## 2024-08-09 RX ORDER — LIDOCAINE HYDROCHLORIDE 20 MG/ML
INJECTION, SOLUTION INFILTRATION; PERINEURAL
Status: COMPLETED
Start: 2024-08-09 | End: 2024-08-09

## 2024-08-09 NOTE — PROGRESS NOTES
THORACIC SURGERY PROGRESS NOTE    Subjective: eventful day with chest tube kinking leading to worsening PTX and need for new tube.  Feeling better now.  C/o of  her new subQ emphysema and poor appetite    Objective:    Vitals:    08/09/24 1526   BP: 124/69   Pulse: 98   Resp: 22   Temp: 97.9 °F (36.6 °C)     I/O last 3 completed shifts:  In: 970 [P.O.:960; I.V.:10]  Out: 33 [Chest Tube:33]    Chest tube: on suction, continuous air leak. Unchanged from yesterday    Exam:    General: Awake, alert, NAD. subQ air notedd  Pulm: CTA B, no w/w/r  Card: RRR, no m/r/g      Assessment: 78 year old, female with pneumothorax and continuous air leak.  I have recommended surgery. This would be a bleb resection and talc pleurodesis. Her family is flying into town and she will make a decision once they have arrived to discuss further    Plan:    Continue chest tube to suction  Await pt decision on surgery    Brian Orona MD  Thoracic Surgery  Pager: [phone]

## 2024-08-09 NOTE — PLAN OF CARE
A&Ox4. Pt ambulates stand by assist with rolling walker, voiding independently, tolerating full liquid diet, on 4L via nasal canula. Frequent rounding by nursing staff. Safety precautions maintained/call light within reach.     Problem: Patient Centered Care  Goal: Patient preferences are identified and integrated in the patient's plan of care  Description: Interventions:  - What would you like us to know as we care for you? From home alone  - Provide timely, complete, and accurate information to patient/family  - Incorporate patient and family knowledge, values, beliefs, and cultural backgrounds into the planning and delivery of care  - Encourage patient/family to participate in care and decision-making at the level they choose  - Honor patient and family perspectives and choices  Outcome: Progressing     Problem: Patient/Family Goals  Goal: Patient/Family Long Term Goal  Description: Patient's Long Term Goal: discharge    Interventions:  - - Monitor vitals  - Monitor appropriate labs  - Pain management  - Follow MD order  - Diagnostics per order  - Update/Informing patient and family on plan of care  - Discharge planning  - See additional Care Plan goals for specific interventions  Outcome: Progressing  Goal: Patient/Family Short Term Goal  Description: Patient's Short Term Goal: feel better    Interventions:   - - Monitor vitals  - Monitor appropriate labs  - Pain management  - Follow MD order  - Diagnostics per order  - Update/Informing patient and family on plan of care  - Discharge planning  - See additional Care Plan goals for specific interventions  Outcome: Progressing     Problem: PAIN - ADULT  Goal: Verbalizes/displays adequate comfort level or patient's stated pain goal  Description: INTERVENTIONS:  - Encourage pt to monitor pain and request assistance  - Assess pain using appropriate pain scale  - Administer analgesics based on type and severity of pain and evaluate response  - Implement  non-pharmacological measures as appropriate and evaluate response  - Consider cultural and social influences on pain and pain management  - Manage/alleviate anxiety  - Utilize distraction and/or relaxation techniques  - Monitor for opioid side effects  - Notify MD/LIP if interventions unsuccessful or patient reports new pain  - Anticipate increased pain with activity and pre-medicate as appropriate  Outcome: Progressing     Problem: CARDIOVASCULAR - ADULT  Goal: Maintains optimal cardiac output and hemodynamic stability  Description: INTERVENTIONS:  - Monitor vital signs, rhythm, and trends  - Monitor for bleeding, hypotension and signs of decreased cardiac output  - Evaluate effectiveness of vasoactive medications to optimize hemodynamic stability  - Monitor arterial and/or venous puncture sites for bleeding and/or hematoma  - Assess quality of pulses, skin color and temperature  - Assess for signs of decreased coronary artery perfusion - ex. Angina  - Evaluate fluid balance, assess for edema, trend weights  Outcome: Progressing

## 2024-08-09 NOTE — SPIRITUAL CARE NOTE
Spiritual Care Visit Note    Patient Name: Annmarie Levin Date of Spiritual Care Visit: 24   : 1945 Primary Dx: Pneumothorax, unspecified type       Referred By: Referral From: Care Coordination,     Spiritual Care Taxonomy:    Intended Effects: Convey a calming presence    Methods: Offer spiritual/Religion support    Interventions: Explain  role;[place]    Visit Type/Summary:     - Spiritual Care: Provided support for Patient's spiritual/Religion requests. Offered prayer.  remains available for follow up.    Spiritual Care support can be requested via an Epic consult. For urgent/immediate needs, please contact the On Call  at: [place]: ext [phone]    Chaplain Sonido.

## 2024-08-09 NOTE — PROGRESS NOTES
[facility]  part of [facility]ist Progress Note     Annmarie Levin Patient Status:  Inpatient    1945 MRN [id number]   Location [facility]5W Attending Chay Adams MD   Hosp Day # 4 PCP LLOYD ANDREWS MD     Chief Complaint:   Chief Complaint   Patient presents with    Shortness Of Breath    Chest Pain Angina        Subjective:     Patient seen sitting in bed.  No family at bedside.  Patient reports development of chest and right upper extremity swelling.  Patient states shortness of breath seems similar to previous.  Denies current chest pain.      Objective:      Vital signs:  Vitals:    24 2054 24 2139 24 0222 24 0602   BP:  114/66 129/62 122/56   BP Location:  Left arm Left arm Left arm   Pulse:  97 84 76   Resp:  18 18 20   Temp:  97.9 °F (36.6 °C) 97.9 °F (36.6 °C) 97.8 °F (36.6 °C)   TempSrc:  Oral Oral Oral   SpO2: 94% 93% 93% 96%   Weight:       Height:           Intake/Output Summary (Last 24 hours) at 2024 0848  Last data filed at 2024 0604  Gross per 24 hour   Intake 480 ml   Output 15 ml   Net 465 ml           Physical Exam:    GENERAL:  Awake and alert, in no acute distress.  CHEST: Right-sided chest tube in place.  Chest wall/chest subcutaneous emphysema going up into neck and right upper extremity.  HEART:  Regular rhythm, tachycardia  LUNGS:  Air entry was decreased, worse over the right.  No increased work of breathing or wheezes   ABDOMEN: Soft and non-tender.    PSYCHIATRIC: Normal mood    Diagnostic Data:    Labs:    Recent Labs   Lab 24  0457 24  0446 24  0514   WBC 6.5 4.8 7.0   HGB 11.0* 10.5* 10.9*   .2* 102.6* 102.5*   .0 247.0 256.0       Recent Labs   Lab 24  04524  0446 24  0512   * 115* 113*   BUN 17 14 14   CREATSERUM 0.82 0.64 0.72   CA 9.6 9.3 9.3    139 138   K 4.5 4.6 4.4    104 102   CO2 30.0 31.0 33.0*            Estimated Creatinine Clearance: 51.8 mL/min (based on SCr of 0.72 mg/dL).    No results for input(s): \"PTP\", \"INR\" in the last 168 hours.         COVID-19  Lab Results   Component Value Date    COVID19 Not Detected 10/14/2022       Pro-Calcitonin  No results for input(s): \"PCT\" in the last 168 hours.    Cardiac  No results for input(s): \"TROP\", \"PBNP\" in the last 168 hours.    Inflammatory Markers  Recent Labs   Lab 08/04/24 2011   DDIMER 0.81*       Culture:  No results found for this visit on 08/04/24.    XR CHEST AP PORTABLE  (CPT=71045)    Result Date: 8/9/2024  CONCLUSION:  1. Enlarging right upper and new moderate sized right basal pneumothorax as described above.  Repositioning of the right-sided pigtail chest tube now located in the right mid chest.  Extensive emphysema in the subcutaneous tissues of the right neck and right lateral chest wall has increased significantly since previous study.  Correlate clinically.  2. Results regarding the enlarging right-sided pneumothorax were called the nursing floor by technologist Camilla.    Dictated by (CST): Buck Jimenez MD on 8/09/2024 at 8:39 AM     Finalized by (CST): Buck Jimenez MD on 8/09/2024 at 8:43 AM          IR CHEST TUBE    Result Date: 8/9/2024  CONCLUSION:  1. Fluoroscopic guided placement of small bore chest tube for evacuation of recurrent right-sided pneumothorax.    Dictated by (CST): Robert Montoya MD on 8/09/2024 at 8:15 AM     Finalized by (CST): Robert Montoya MD on 8/09/2024 at 8:17 AM          CT CHEST(CONTRAST ONLY) (CPT=71260)    Result Date: 8/8/2024  CONCLUSION:   The pigtail catheter is malpositioned in the right chest wall subcutaneous tissues  Small right pneumothorax.  No effusion.  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 Interval increase in a moderate-sized now 2.7 cm in height right apical pneumothorax.  The right pigtail chest tube has been pulled back in the sideholes are now outside of the pleural space in the right lower chest.  Correlate clinically as to intended location.  Slight interval increase in right-sided subcutaneous emphysema.  Follow-up study advised.    Dictated by (CST): Buck Jimenez MD on 8/08/2024 at 8:50 AM     Finalized by (CST): Buck Jimenez MD on 8/08/2024 at 8:54 AM          XR CHEST AP PORTABLE  (CPT=71045)    Result Date: 8/7/2024  CONCLUSION:  1. Cardiomegaly.  Tortuous thoracic aorta. 2. Diffuse chronic pulmonary interstitial prominence.  Chronic right apical pleural parenchymal abnormality with known traction bronchiectasis in the right apex. 3. Persistent small right apical pneumothorax measuring 17 mm with slight progression.  Percutaneous pigtail catheter drain in place.  Follow-up to resolution to exclude bronchopleural fistula.    Dictated by (CST): Earl Barber MD on 8/07/2024 at 10:15 AM     Finalized by (CST): Earl Barber MD on 8/07/2024 at 10:18 AM                 Medications:    ipratropium-albuterol  3 mL Nebulization 2 times daily    docusate sodium  100 mg Oral BID    predniSONE  40 mg Oral Daily with breakfast    montelukast  10 mg Oral Nightly    pantoprazole  40 mg Oral QAM AC    fluticasone-salmeterol  1 puff Inhalation BID    [Held by provider] heparin  5,000 Units Subcutaneous 2 times per day       Assessment & Plan:      Spontaneous pneumothorax  -Status  post chest tube placement.    -Pulmonology managing, continue to suction.    -Chest x-ray 8/8 reviewed.  Noted enlarging of right upper lobe pneumothorax.  Also described malposition of chest tube.    -Status post IR replacement of chest tube on 8/8.  -Chest x-ray 8/9 reviewed.  Noted an enlarging right upper and new moderate size right basilar pneumothorax.  Also describing extensive emphysema in the subcutaneous tissues.  -Discussed findings with IR.  Plan for removal and replacement of chest tube.  -Continue supplemental O2  -Pain control as needed, close monitoring with narcotics  -If patient fails to respond to treatment, considering VATS procedure.  CV surgery consulted, appreciate further recommendations.  -Appreciate further recommendations from pulmonology.     COPD with possible exacerbation  -Initially treated with IV Solu-Medrol, transitioned to p.o. steroids.  -Continue DuoNebs as needed   -Continue home inhalers.  -Pulmonology on consult, appreciate further recommendation.      Obesity with obstructive sleep apnea  -BMI 34.    -CPAP deferred due to PTX    Chronic respiratory failure  -Patient on intermittent supplemental O2 at home  -Mostly with exertion  -Continue supplemental O2      Plan of care discussed with patient at bedside . Discussed management/test result(s) with Rn and IR consult    Addendum RRT called as patient having increased: Difficulty breathing and worsening swelling.  On arrival to bedside, patient symptoms were improving.  Denied current throat swelling or difficulties breathing.  Did note mild worsening of subcutaneous emphysema.  Discussed again with IR consultant.  Planning for chest tube removal and replacement today.  Pulmonology notified.    Quality:  DVT Prophylaxis: Heparin  CODE status: Full  Estimated date of discharge: TBD  Discharge is dependent on: clinical stability    88 minutes spent discussing with other providers, examining patient, obtaining history, reviewing  medical records, interpreting and communicating test results/imaging, ordering tests/medications, discussing plan of care and documenting information.      Chay Adams MD          This note was prepared using Dragon Medical voice recognition dictation software. As a result errors may occur. When identified these errors have been corrected. While every attempt is made to correct errors during dictation discrepancies may still exist

## 2024-08-09 NOTE — PROGRESS NOTES
[facility]  part of Located within [facility]  Progress Note      Annmarie Levin Patient Status:  Inpatient    1945 MRN [id number]   Location [facility]5W Attending Chay Adams MD   Hosp Day # 4 PCP LLOYD ANDREWS MD       Subjective:   Increased SOB earlier today, RRT was called. Now in bed, family at bedside, feels better    Objective:   A/ox3  Unlabored breathing, 6L 02 NC  R chest tube  Right chest/neck and upper extremity swelling with crepitus    Vital Signs:  Blood pressure 155/81, pulse 92, temperature (P) 97.8 °F (36.6 °C), temperature source (P) Oral, resp. rate 22, height 62\", weight 189 lb 6.4 oz (85.9 kg), last menstrual period 10/18/1991, SpO2 94%.    Input/Output:    Intake/Output Summary (Last 24 hours) at 2024 1126  Last data filed at 2024 1004  Gross per 24 hour   Intake 420 ml   Output 31 ml   Net 389 ml       Results:   Labs:  Lab Results   Component Value Date    WBC 7.0 2024    RBC 3.21 2024    HGB 10.9 2024    HCT 32.9 2024    .5 2024    MCH 34.0 2024    MCHC 33.1 2024    RDW 16.1 2024    .0 2024     Recent Labs   Lab 24  0457 24  0446 24  0512   * 115* 113*   BUN 17 14 14   CREATSERUM 0.82 0.64 0.72   EGFRCR 74 91 86   CA 9.6 9.3 9.3    139 138   K 4.5 4.6 4.4    104 102   CO2 30.0 31.0 33.0*     XR CHEST AP PORTABLE  (CPT=71045)    Result Date: 2024  CONCLUSION:  1. Enlarging right upper and new moderate sized right basal pneumothorax as described above.  Repositioning of the right-sided pigtail chest tube now located in the right mid chest.  Extensive emphysema in the subcutaneous tissues of the right neck and right lateral chest wall has increased significantly since previous study.  Correlate clinically.  2. Results regarding the enlarging right-sided pneumothorax were called the nursing floor by technologist Camilla.    Dictated by (CST):  Buck Jimenez MD on 8/09/2024 at 8:39 AM     Finalized by (CST): Buck Jimenez MD on 8/09/2024 at 8:43 AM          IR CHEST TUBE    Result Date: 8/9/2024  CONCLUSION:  1. Fluoroscopic guided placement of small bore chest tube for evacuation of recurrent right-sided pneumothorax.    Dictated by (CST): Robert Montoya MD on 8/09/2024 at 8:15 AM     Finalized by (CST): Robert Montoya MD on 8/09/2024 at 8:17 AM          CT CHEST(CONTRAST ONLY) (CPT=71260)    Result Date: 8/8/2024  CONCLUSION:   The pigtail catheter is malpositioned in the right chest wall subcutaneous tissues  Small right pneumothorax.  No effusion.  No midline shift  Mild peripheral fibrosis and traction bronchiectasis in the right lung, with confluent apical fibrosis that contains multiple small blebs, the likely source of the PTX  Thin walled complex probable cyst in the anterior mediastinum containing tiny amounts of layering milk of calcium, slowly growing since 2006. 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 Interval increase in a moderate-sized now 2.7 cm in height right apical pneumothorax.  The right pigtail chest tube has been pulled back in the sideholes are now outside of the pleural space in the right lower chest.  Correlate clinically as to intended location.  Slight interval increase in right-sided subcutaneous emphysema.  Follow-up study advised.    Dictated by (CST): Buck Jimenez MD on 8/08/2024 at 8:50 AM     Finalized by (CST): Buck Jimenez MD on 8/08/2024 at 8:54 AM             Assessment and Plan:   S/p chest tube replacement yesterday  CXR today with Enlarging right upper and new moderate sized right basal pneumothorax Extensive emphysema in the  subcutaneous tissues of the right neck and   right lateral chest wall has increased significantly since previous study   RRT was called earlier, worsening SOB, now stable    Plan: Remove present chest tube, new chest tube insertion today  Discussed with patient     AUTUMN Castro  8/9/2024  11:26 AM

## 2024-08-09 NOTE — PROCEDURES
Interventional Radiology  Brief Post-Procedure Note    Procedure(s): chest tube exchange    Indication: spontaneous right pneumothorax, worsening despite chest tube replacement yesterday    (s): Melissa    Anesthesia: Sedation    Findings:    -existing right chest tube kinked and retracted  -exchanged for new 12 Greek chest tube, positioned at lung apex  -attached to collection chamber    Blood loss: <5 mL      Complications: None    Plan:   -maintain water seal  -daily AM chest xray    Please refer to full dictation under the \"Imaging\" tab in Epic.    Wayne Torres MD  8/9/2024  Interventional Radiology  [facility]

## 2024-08-09 NOTE — PROGRESS NOTES
[facility] Pulmonary, Critical Care and Sleep    Annmarie La Habra Patient Status:  Inpatient    1945 MRN [id number]   Location [facility]5W Attending Chay Adams MD   Hosp Day # 4 PCP LLOYD ANDREWS MD     Date of Admission: 2024  7:47 PM    Admission Diagnosis: Pneumothorax, unspecified type [J93.9]    S: increasing R sided arm pain.     Scheduled Medications:     ipratropium-albuterol  3 mL Nebulization 2 times daily    docusate sodium  100 mg Oral BID    predniSONE  40 mg Oral Daily with breakfast    montelukast  10 mg Oral Nightly    pantoprazole  40 mg Oral QAM AC    fluticasone-salmeterol  1 puff Inhalation BID    [Held by provider] heparin  5,000 Units Subcutaneous 2 times per day       Infusing Medications:      PRN Medications:    polyethylene glycol (PEG 3350)    magnesium hydroxide    bisacodyl    fleet enema    ondansetron    acetaminophen    HYDROcodone-acetaminophen    hydrALAzine    ipratropium-albuterol    zolpidem    alum-mag hydroxide-simethicone    HYDROmorphone    OBJECTIVE:  /81 (BP Location: Left arm)   Pulse 92   Temp (P) 97.8 °F (36.6 °C) (Oral)   Resp 22   Ht 157.5 cm (5' 2\")   Wt 189 lb 6.4 oz (85.9 kg)   LMP 10/18/1991 (Approximate)   SpO2 94%   BMI 34.64 kg/m²    Temp (24hrs), Av.8 °F (36.6 °C), Min:96.9 °F (36.1 °C), Max:98.2 °F (36.8 °C)             Wt Readings from Last 3 Encounters:   24 189 lb 6.4 oz (85.9 kg)   24 186 lb (84.4 kg)   24 191 lb (86.6 kg)       I/O last 3 completed shifts:  In: 970 [P.O.:960; I.V.:10]  Out: 33 [Chest Tube:33]  I/O this shift:  In: 180 [P.O.:180]  Out: 16 [Chest Tube:16]     O2: 2 LNC  General: NAD.   Neuro: Alert, no focal deficits.    HEENT: PERRL  Neck : No LAD, +creptitus.   CV: RRR, nl S1, S2, no S4, S3 or murmur.   Lungs: Crepitus and decreased R>L.   Abd: Nontender, non distended.   Ext: No edema.   Skin: No rashes.     Recent Labs   Lab 24  0457 24  0446 24  0514    WBC 6.5 4.8 7.0   HGB 11.0* 10.5* 10.9*   HCT 32.8* 31.8* 32.9*   .0 247.0 256.0     Recent Labs   Lab 08/07/24  0457 08/08/24  0446 08/09/24  0512   * 115* 113*   BUN 17 14 14   CREATSERUM 0.82 0.64 0.72   CA 9.6 9.3 9.3    139 138   K 4.5 4.6 4.4    104 102   CO2 30.0 31.0 33.0*     No results for input(s): \"INR\", \"PTT\" in the last 168 hours.  No results for input(s): \"ABGPHT\", \"WFYLYR1M\", \"HAVWV1H\", \"ABGHCO3\", \"SITE\", \"DEV\", \"THGB\" in the last 168 hours.    COVID-19 Lab Results    COVID-19  Lab Results   Component Value Date    COVID19 Not Detected 10/14/2022       Pro-Calcitonin  No results for input(s): \"PCT\" in the last 168 hours.    Cardiac  No results for input(s): \"TROP\", \"PBNP\" in the last 168 hours.    Creatinine Kinase  No results for input(s): \"CK\" in the last 168 hours.    Inflammatory Markers  Recent Labs   Lab 08/04/24 2011   DDIMER 0.81*       Imaging:   CXR 8/9/2024  1. Enlarging right upper and new moderate sized right basal pneumothorax as described above.  Repositioning of the right-sided pigtail chest tube now located in the right mid chest.  Extensive emphysema in the subcutaneous tissues of the right neck and   right lateral chest wall has increased significantly since previous study.  Correlate clinically.    2. Results regarding the enlarging right-sided pneumothorax were called the nursing floor by technologist Camilla.     Chest images personally reviewed.       Assessment/Plan   PTX - has known RUL scarring  - chest tube repalced 8/8.   Does appear to have moved caudally  Will have IR reevaluate.   Increase Fio2 to 6 LPM.   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

## 2024-08-10 ENCOUNTER — APPOINTMENT (OUTPATIENT)
Dept: GENERAL RADIOLOGY | Facility: HOSPITAL | Age: 79
End: 2024-08-10
Attending: INTERNAL MEDICINE
Payer: MEDICARE

## 2024-08-10 ENCOUNTER — APPOINTMENT (OUTPATIENT)
Dept: GENERAL RADIOLOGY | Facility: HOSPITAL | Age: 79
End: 2024-08-10
Attending: HOSPITALIST
Payer: MEDICARE

## 2024-08-10 LAB
ANION GAP SERPL CALC-SCNC: 5 MMOL/L (ref 0–18)
BASOPHILS # BLD AUTO: 0.01 X10(3) UL (ref 0–0.2)
BASOPHILS NFR BLD AUTO: 0.2 %
BUN BLD-MCNC: 13 MG/DL (ref 9–23)
BUN/CREAT SERPL: 19.1 (ref 10–20)
CALCIUM BLD-MCNC: 9.5 MG/DL (ref 8.7–10.4)
CHLORIDE SERPL-SCNC: 103 MMOL/L (ref 98–112)
CO2 SERPL-SCNC: 34 MMOL/L (ref 21–32)
CREAT BLD-MCNC: 0.68 MG/DL
DEPRECATED RDW RBC AUTO: 58.6 FL (ref 35.1–46.3)
EGFRCR SERPLBLD CKD-EPI 2021: 90 ML/MIN/1.73M2 (ref 60–?)
EOSINOPHIL # BLD AUTO: 0.11 X10(3) UL (ref 0–0.7)
EOSINOPHIL NFR BLD AUTO: 1.7 %
ERYTHROCYTE [DISTWIDTH] IN BLOOD BY AUTOMATED COUNT: 16.2 % (ref 11–15)
GLUCOSE BLD-MCNC: 101 MG/DL (ref 70–99)
HCT VFR BLD AUTO: 34.2 %
HGB BLD-MCNC: 11.8 G/DL
IMM GRANULOCYTES # BLD AUTO: 0.01 X10(3) UL (ref 0–1)
IMM GRANULOCYTES NFR BLD: 0.2 %
LYMPHOCYTES # BLD AUTO: 1.1 X10(3) UL (ref 1–4)
LYMPHOCYTES NFR BLD AUTO: 17.2 %
MCH RBC QN AUTO: 34.5 PG (ref 26–34)
MCHC RBC AUTO-ENTMCNC: 34.5 G/DL (ref 31–37)
MCV RBC AUTO: 100 FL
MONOCYTES # BLD AUTO: 0.64 X10(3) UL (ref 0.1–1)
MONOCYTES NFR BLD AUTO: 10 %
NEUTROPHILS # BLD AUTO: 4.53 X10 (3) UL (ref 1.5–7.7)
NEUTROPHILS # BLD AUTO: 4.53 X10(3) UL (ref 1.5–7.7)
NEUTROPHILS NFR BLD AUTO: 70.7 %
OSMOLALITY SERPL CALC.SUM OF ELEC: 294 MOSM/KG (ref 275–295)
PLATELET # BLD AUTO: 266 10(3)UL (ref 150–450)
POTASSIUM SERPL-SCNC: 4.2 MMOL/L (ref 3.5–5.1)
RBC # BLD AUTO: 3.42 X10(6)UL
SODIUM SERPL-SCNC: 142 MMOL/L (ref 136–145)
WBC # BLD AUTO: 6.4 X10(3) UL (ref 4–11)

## 2024-08-10 PROCEDURE — 71045 X-RAY EXAM CHEST 1 VIEW: CPT | Performed by: INTERNAL MEDICINE

## 2024-08-10 PROCEDURE — 71045 X-RAY EXAM CHEST 1 VIEW: CPT | Performed by: HOSPITALIST

## 2024-08-10 NOTE — PLAN OF CARE
A&O x 4. On 3 L/min via HFNC and saturating 92-96%. No complaints of pain. Per patient, coughing improving. Chest tube monitoring per orders. Tolerating soft diet, advanced to general diet and tolerating her meals. Frequent rounding preformed. Call light and personal items within reach.     Problem: Patient Centered Care  Goal: Patient preferences are identified and integrated in the patient's plan of care  Description: Interventions:  - What would you like us to know as we care for you? From home alone  - Provide timely, complete, and accurate information to patient/family  - Incorporate patient and family knowledge, values, beliefs, and cultural backgrounds into the planning and delivery of care  - Encourage patient/family to participate in care and decision-making at the level they choose  - Honor patient and family perspectives and choices  Outcome: Progressing     Problem: Patient/Family Goals  Goal: Patient/Family Long Term Goal  Description: Patient's Long Term Goal: discharge    Interventions:  - - Monitor vitals  - Monitor appropriate labs  - Pain management  - Follow MD order  - Diagnostics per order  - Update/Informing patient and family on plan of care  - Discharge planning  - See additional Care Plan goals for specific interventions  Outcome: Progressing  Goal: Patient/Family Short Term Goal  Description: Patient's Short Term Goal: feel better    Interventions:   - - Monitor vitals  - Monitor appropriate labs  - Pain management  - Follow MD order  - Diagnostics per order  - Update/Informing patient and family on plan of care  - Discharge planning  - See additional Care Plan goals for specific interventions  Outcome: Progressing     Problem: PAIN - ADULT  Goal: Verbalizes/displays adequate comfort level or patient's stated pain goal  Description: INTERVENTIONS:  - Encourage pt to monitor pain and request assistance  - Assess pain using appropriate pain scale  - Administer analgesics based on type and  severity of pain and evaluate response  - Implement non-pharmacological measures as appropriate and evaluate response  - Consider cultural and social influences on pain and pain management  - Manage/alleviate anxiety  - Utilize distraction and/or relaxation techniques  - Monitor for opioid side effects  - Notify MD/LIP if interventions unsuccessful or patient reports new pain  - Anticipate increased pain with activity and pre-medicate as appropriate  Outcome: Progressing     Problem: CARDIOVASCULAR - ADULT  Goal: Maintains optimal cardiac output and hemodynamic stability  Description: INTERVENTIONS:  - Monitor vital signs, rhythm, and trends  - Monitor for bleeding, hypotension and signs of decreased cardiac output  - Evaluate effectiveness of vasoactive medications to optimize hemodynamic stability  - Monitor arterial and/or venous puncture sites for bleeding and/or hematoma  - Assess quality of pulses, skin color and temperature  - Assess for signs of decreased coronary artery perfusion - ex. Angina  - Evaluate fluid balance, assess for edema, trend weights  Outcome: Progressing     Problem: RESPIRATORY - ADULT  Goal: Achieves optimal ventilation and oxygenation  Description: INTERVENTIONS:  - Assess for changes in respiratory status  - Assess for changes in mentation and behavior  - Position to facilitate oxygenation and minimize respiratory effort  - Oxygen supplementation based on oxygen saturation or ABGs  - Provide Smoking Cessation handout, if applicable  - Encourage broncho-pulmonary hygiene including cough, deep breathe, Incentive Spirometry  - Assess the need for suctioning and perform as needed  - Assess and instruct to report SOB or any respiratory difficulty  - Respiratory Therapy support as indicated  - Manage/alleviate anxiety  - Monitor for signs/symptoms of CO2 retention  Outcome: Progressing

## 2024-08-10 NOTE — PROGRESS NOTES
[facility]  [facility]     Annmarie Levin Patient Status:  Inpatient    1945 MRN [id number]   Location [facility]5W Attending Chay Adams MD   Hosp Day # 5 PCP LLOYD ANDREWS MD     Chief Complaint:   Chief Complaint   Patient presents with    Shortness Of Breath    Chest Pain Angina        Subjective:     Patient seen sitting in chair.  No family at bedside.  Patient reports she has had some waxing and waning swelling of face and eyes overnight.  Seems to be a little better this a.m.  Denies current worsening of shortness of breath.  Denies current chest pain.      Objective:      Vital signs:  Vitals:    24 2044 08/10/24 0031 08/10/24 0628 08/10/24 0851   BP: 125/62 155/78 148/82 141/74   BP Location: Left arm Left arm Left arm Left arm   Pulse: 97 94 87 87   Resp:     Temp: 98 °F (36.7 °C) 97.4 °F (36.3 °C) 98.1 °F (36.7 °C) 97.6 °F (36.4 °C)   TempSrc: Oral Oral Oral Oral   SpO2: 96% 97% 95% 93%   Weight:       Height:           Intake/Output Summary (Last 24 hours) at 8/10/2024 0940  Last data filed at 8/10/2024 0800  Gross per 24 hour   Intake 240 ml   Output 41 ml   Net 199 ml           Physical Exam:    GENERAL:  Awake and alert, in no acute distress.  CHEST: Right-sided chest tube in place.  Chest wall/chest crepitus going up into neck and right upper extremity.  HEART:  Regular rhythm, tachycardia  LUNGS:  Air entry was decreased, worse over the right.  No increased work of breathing or wheezes   ABDOMEN: Soft and non-tender.    PSYCHIATRIC: Normal mood    Diagnostic Data:    Labs:    Recent Labs   Lab 24  0446 24  0514 08/10/24  0452   WBC 4.8 7.0 6.4   HGB 10.5* 10.9* 11.8*   .6* 102.5* 100.0   .0 256.0 266.0       Recent Labs   Lab 24  0446 24  0512 08/10/24  0452   * 113* 101*   BUN 14 14 13   CREATSERUM 0.64 0.72 0.68   CA 9.3 9.3 9.5    138 142   K 4.6  4.4 4.2    102 103   CO2 31.0 33.0* 34.0*           Estimated Creatinine Clearance: 54.8 mL/min (based on SCr of 0.68 mg/dL).    No results for input(s): \"PTP\", \"INR\" in the last 168 hours.         COVID-19  Lab Results   Component Value Date    COVID19 Not Detected 10/14/2022       Pro-Calcitonin  No results for input(s): \"PCT\" in the last 168 hours.    Cardiac  No results for input(s): \"TROP\", \"PBNP\" in the last 168 hours.    Inflammatory Markers  Recent Labs   Lab 08/04/24 2011   DDIMER 0.81*       Culture:  No results found for this visit on 08/04/24.    IR CHEST TUBE    Addendum Date: 8/9/2024    CORRECTION Corrected on: 8/09/2024;   PROCEDURE: IR CHEST TUBE EXCHANGE  INDICATIONS:  Spontaneous pneumothorax post chest tube insertion in the ED on 08/04/2024.  Subsequent dislodgement with subcutaneous emphysema, post removal and new chest tube insertion by IR on 08/08/2024.  Now with worsening pneumothorax.  Here for check and exchange.  (S): Melissa  ANESTHESIA/SEDATION: Level of anesthesia/sedation: Moderate sedation (conscious sedation) Anesthesia/sedation administered by: Nurse or other independent trained observer who has no other duties with continuous monitoring of the patient's level of consciousness and physiologic status, under the personal supervision of the attending physician. Total intra-service sedation time: 10 min  FLUOROSCOPY TIME:  0.8 min AIR KERMA:  2.5 mGy  ESTIMATED BLOOD LOSS: Less than 5 mL  COMPLICATIONS: None  FINDINGS:  Informed consent was obtained.  The right chest and existing tube were sterilely prepped and draped.   fluoroscopy demonstrated a large right pneumothorax.  The existing chest tube was severely kinked and retracted to the chest wall.  Local lidocaine was administered.  The tube was cut.  Under fluoroscopic guidance, a wire was advanced through the tube into the right chest.  The tube was removed over the wire.  A new 12 Tuvaluan drainage catheter was  advanced over the wire and formed in the apex of the chest.  There was immediate return of air from the catheter.  The catheter was secured to the skin with sutures and attached to a collection chamber.  CONCLUSION:  Existing right chest tube is kinked and retracted to chest wall.  Exchanged for new 12 [place]ian chest tube and repositioned into apex.  Maintain chest tube on water seal.  Daily chest x-rays.     Dictated by (CST): Wayne Torres MD on 8/09/2024 at 6:11 PM     Finalized by (CST): Wayne Torres MD on 8/09/2024 at 6:17 PM    Dictated by (CST): Wayne Torres MD on 8/09/2024 at 7:35 PM     Finalized by (CST): Wayne Torres MD on 8/09/2024 at 7:35 PM              Result Date: 8/9/2024  CONCLUSION:  Existing right chest tube is kinked and retracted to chest wall.  Exchanged for new 12 Zambian chest tube and repositioned into apex.  Maintain chest tube on water seal.  Daily chest x-rays.     Dictated by (CST): Wayne Torres MD on 8/09/2024 at 6:11 PM     Finalized by (CST): Wayne Torres MD on 8/09/2024 at 6:17 PM          XR CHEST AP PORTABLE  (CPT=71045)    Result Date: 8/9/2024  CONCLUSION:  1. Enlarging right upper and new moderate sized right basal pneumothorax as described above.  Repositioning of the right-sided pigtail chest tube now located in the right mid chest.  Extensive emphysema in the subcutaneous tissues of the right neck and right lateral chest wall has increased significantly since previous study.  Correlate clinically.  2. Results regarding the enlarging right-sided pneumothorax were called the nursing floor by technologist Camilla.    Dictated by (CST): Buck Jimenez MD on 8/09/2024 at 8:39 AM     Finalized by (CST): Buck Jimenez MD on 8/09/2024 at 8:43 AM          IR CHEST TUBE    Result Date: 8/9/2024  CONCLUSION:  1. Fluoroscopic guided placement of small bore chest tube for evacuation of recurrent right-sided pneumothorax.    Dictated by (CST): Jan  Robert PATEL MD on 8/09/2024 at 8:15 AM     Finalized by (CST): Robert Montoya MD on 8/09/2024 at 8:17 AM          CT CHEST(CONTRAST ONLY) (CPT=71260)    Result Date: 8/8/2024  CONCLUSION:   The pigtail catheter is malpositioned in the right chest wall subcutaneous tissues  Small right pneumothorax.  No effusion.  No midline shift  Mild peripheral fibrosis and traction bronchiectasis in the right lung, with confluent apical fibrosis that contains multiple small blebs, the likely source of the PTX  Thin walled complex probable cyst in the anterior mediastinum containing tiny amounts of layering milk of calcium, slowly growing since 2006. 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 Interval increase in a moderate-sized now 2.7 cm in height right apical pneumothorax.  The right pigtail chest tube has been pulled back in the sideholes are now outside of the pleural space in the right lower chest.  Correlate clinically as to intended location.  Slight interval increase in right-sided subcutaneous emphysema.  Follow-up study advised.    Dictated by (CST): Buck Jimenez MD on 8/08/2024 at 8:50 AM     Finalized by (CST): Buck Jimenez MD on 8/08/2024 at 8:54 AM                 Medications:    ipratropium-albuterol  3 mL Nebulization 2 times daily    docusate sodium  100 mg Oral BID    predniSONE  40 mg Oral Daily with breakfast    montelukast  10 mg Oral Nightly    pantoprazole  40 mg Oral QAM AC    fluticasone-salmeterol  1 puff Inhalation BID    [Held by provider] heparin  5,000 Units Subcutaneous 2 times per day       Assessment & Plan:      Spontaneous pneumothorax  -Chest x-ray 8/8 reviewed.  Noted enlarging of right upper lobe pneumothorax.   Also described malposition of chest tube.    -Status post IR replacement of chest tube on 8/8.  -Chest x-ray 8/9 reviewed.  Noted an enlarging right upper and new moderate size right basilar pneumothorax.  Also describing extensive emphysema in the subcutaneous tissues.  -Chest x-ray 810 reviewed.  Noted no significant change.  -Discussed with IR.  Status post removal and replacement of chest tube.  -Continue supplemental O2  -Pain control as needed, close monitoring with narcotics  -Pulmonology on consult, appreciate further recommendations.  -As patient fails to respond to treatment, CV surgery was consulted.  Recommending proceeding with VATS/pleurodesis procedure.  Appreciate timing and further recommendations     COPD with possible exacerbation  -Initially treated with IV Solu-Medrol, transitioned to p.o. steroids.  -Continue DuoNebs as needed   -Continue home inhalers.  -Pulmonology on consult, appreciate further recommendation.      Obesity with obstructive sleep apnea  -BMI 34.    -CPAP deferred due to PTX    Chronic respiratory failure  -Patient on intermittent supplemental O2 at home  -Mostly with exertion  -Continue supplemental O2      Plan of care discussed with patient at bedside . Discussed management/test result(s) with Rn and pulmonology consultant    Quality:  DVT Prophylaxis: Heparin  CODE status: Full  Estimated date of discharge: TBD  Discharge is dependent on: clinical stability    55 minutes spent discussing with other providers, examining patient, obtaining history, reviewing medical records, interpreting and communicating test results/imaging, ordering tests/medications, discussing plan of care and documenting information.      Chay Adams MD          This note was prepared using Dragon Medical voice recognition dictation software. As a result errors may occur. When identified these errors have been corrected. While every attempt is made to correct errors during dictation  discrepancies may still exist

## 2024-08-10 NOTE — SIGNIFICANT EVENT
RRT    *See RRT Documentation Record*    Reason the RRT was called: pt with crepitus to right chest and arm and swelling of arm and face related to chest tube insertion site.  Assessment of patient leading up to RRT: crepitus and swelling  Interventions/Testing: chest tube change for larger bore in IR department.  Patient Outcome/Disposition: pt stays in unit.  Family Notified: yes  Name of family notified family at bedside

## 2024-08-10 NOTE — PROGRESS NOTES
Pulmonary Progress Note        NAME: Annmarie Levin - ROOM: 511511-A - MRN: [id number] - Age: 78 year old - : 1945    Past Medical History:    Arrhythmia    hx of rapid heartbeat     Perales's esophagus    Perales's esophagus    path consistent; EGD 2007; Arpit Ellison    Breast cancer (HCC)     - [id number];  Mastectomy/Chemo/Radiation.,right breast    Breast cancer of upper-outer quadrant of right female breast (HCC)     - [id number];  Mastectomy/Chemo/Radiation     COPD (chronic obstructive pulmonary disease) ([facility])    Environmental allergies    Esophageal reflux    History of stomach ulcers    hx    Hyperlipidemia    Malignant neoplasm of upper-outer quadrant of right breast in female, estrogen receptor negative (HCC)     - [id number];  Mastectomy/Chemo/Radiation     Mixed hyperlipidemia    Organic cardiac disease    Osteoarthritis    bilateral knee    Osteoarthritis of left knee    Synvisc-One injection, Mark Johnson    Osteoarthritis of right foot    steroid injection of subtalar joint of the right foot under fluoroscopy guidance; Mark Johnson    Osteoarthritis of right knee    Synvisc-One, Mark Johnson    Osteopenia of multiple sites    PONV (postoperative nausea and vomiting)    Rosacea    Skin cancer    multiple carcinomas of the skin    Sleep apnea    CPAP     Sleep apnea    CPAP titration; weight reduction, CPAP 15 cm of water, aboidance of respiratory depressants including alcohol and sedatives    Visual impairment         SUBJECTIVE: No new events overnight,     OBJECTIVE:    Oxygen Therapy  SpO2: 93 %  O2 Device: Nasal cannula  Mode: AutoPAP  O2 Flow Rate (L/min): 3 L/min  Pulse Oximetry Type: Intermittent                  Intake/Output Summary (Last 24 hours) at 8/10/2024 1021  Last data filed at 8/10/2024 0800  Gross per 24 hour   Intake 240 ml   Output 25 ml   Net 215 ml       Scheduled Medication:   ipratropium-albuterol  3 mL Nebulization 2 times daily    docusate sodium   100 mg Oral BID    predniSONE  40 mg Oral Daily with breakfast    montelukast  10 mg Oral Nightly    pantoprazole  40 mg Oral QAM AC    fluticasone-salmeterol  1 puff Inhalation BID    [Held by provider] heparin  5,000 Units Subcutaneous 2 times per day     Continuous Infusing Medication:    /74 (BP Location: Left arm)   Pulse 87   Temp 97.6 °F (36.4 °C) (Oral)   Resp 22   Ht 5' 2\" (1.575 m)   Wt 189 lb 6.4 oz (85.9 kg)   LMP 10/18/1991 (Approximate)   SpO2 93%   BMI 34.64 kg/m²   General:  Alert, no distress   Lungs:   Clear to auscultation bilaterally.  Chest tube in place with air leak   Heart:   Regular rhythm    Abdomen:   Soft, non-tender, non distended   Extremities:  no cyanosis or edema.   Neurologic: Oriented  Times 3       Recent Labs   Lab 08/08/24  0446 08/09/24  0514 08/10/24  0452   RBC 3.10* 3.21* 3.42*   HGB 10.5* 10.9* 11.8*   HCT 31.8* 32.9* 34.2*   .6* 102.5* 100.0   MCH 33.9 34.0 34.5*   MCHC 33.0 33.1 34.5   RDW 16.0* 16.1* 16.2*   NEPRELIM 3.39 4.91 4.53   WBC 4.8 7.0 6.4   .0 256.0 266.0         Recent Labs   Lab 08/08/24  0446 08/09/24  0512 08/10/24  0452   * 113* 101*   BUN 14 14 13   CREATSERUM 0.64 0.72 0.68   EGFRCR 91 86 90   CA 9.3 9.3 9.5    138 142   K 4.6 4.4 4.2    102 103   CO2 31.0 33.0* 34.0*     No results found for: \"PT\", \"INR\"        Imaging:         Chest imaging personally reviewed by me    ASSESSMENT/PLAN:    Non resolving pneumothorax still has air leak    - plan for surgical repair   Chronic respiratory failure  - at baseline uses supplemental O2 at 2 LPM as needed  COPD - exacerbation - resolved   - prednisone last dose tomorrow   - continue home inhalers  NADJA  - CPAP deferred due to PTX  Mediastinal mass - likely benign  - outpatient follow-up  Advanced directives -  full   Dispo - stable   - will follow    Darren Campos MD

## 2024-08-10 NOTE — PLAN OF CARE
Problem: Patient Centered Care  Goal: Patient preferences are identified and integrated in the patient's plan of care  Description: Interventions:  - What would you like us to know as we care for you? From home alone  - Provide timely, complete, and accurate information to patient/family  - Incorporate patient and family knowledge, values, beliefs, and cultural backgrounds into the planning and delivery of care  - Encourage patient/family to participate in care and decision-making at the level they choose  - Honor patient and family perspectives and choices  Outcome: Progressing     Problem: Patient/Family Goals  Goal: Patient/Family Long Term Goal  Description: Patient's Long Term Goal: discharge    Interventions:  - - Monitor vitals  - Monitor appropriate labs  - Pain management  - Follow MD order  - Diagnostics per order  - Update/Informing patient and family on plan of care  - Discharge planning  - See additional Care Plan goals for specific interventions  Outcome: Progressing  Goal: Patient/Family Short Term Goal  Description: Patient's Short Term Goal: feel better    Interventions:   - - Monitor vitals  - Monitor appropriate labs  - Pain management  - Follow MD order  - Diagnostics per order  - Update/Informing patient and family on plan of care  - Discharge planning  - See additional Care Plan goals for specific interventions  Outcome: Progressing     Problem: PAIN - ADULT  Goal: Verbalizes/displays adequate comfort level or patient's stated pain goal  Description: INTERVENTIONS:  - Encourage pt to monitor pain and request assistance  - Assess pain using appropriate pain scale  - Administer analgesics based on type and severity of pain and evaluate response  - Implement non-pharmacological measures as appropriate and evaluate response  - Consider cultural and social influences on pain and pain management  - Manage/alleviate anxiety  - Utilize distraction and/or relaxation techniques  - Monitor for opioid  side effects  - Notify MD/LIP if interventions unsuccessful or patient reports new pain  - Anticipate increased pain with activity and pre-medicate as appropriate  Outcome: Progressing     Problem: CARDIOVASCULAR - ADULT  Goal: Maintains optimal cardiac output and hemodynamic stability  Description: INTERVENTIONS:  - Monitor vital signs, rhythm, and trends  - Monitor for bleeding, hypotension and signs of decreased cardiac output  - Evaluate effectiveness of vasoactive medications to optimize hemodynamic stability  - Monitor arterial and/or venous puncture sites for bleeding and/or hematoma  - Assess quality of pulses, skin color and temperature  - Assess for signs of decreased coronary artery perfusion - ex. Angina  - Evaluate fluid balance, assess for edema, trend weights  Outcome: Progressing     Problem: RESPIRATORY - ADULT  Goal: Achieves optimal ventilation and oxygenation  Description: INTERVENTIONS:  - Assess for changes in respiratory status  - Assess for changes in mentation and behavior  - Position to facilitate oxygenation and minimize respiratory effort  - Oxygen supplementation based on oxygen saturation or ABGs  - Provide Smoking Cessation handout, if applicable  - Encourage broncho-pulmonary hygiene including cough, deep breathe, Incentive Spirometry  - Assess the need for suctioning and perform as needed  - Assess and instruct to report SOB or any respiratory difficulty  - Respiratory Therapy support as indicated  - Manage/alleviate anxiety  - Monitor for signs/symptoms of CO2 retention  Outcome: Progressing    Monitoring vital signs- stable at this time. House dr evaluated pt at bedside. Continue to monitor. Continue chest tube to suction, repeat chest xray in am.  Fall precautions maintained- bed alarm on, bed locked in lowest position, call light within reach. Frequent rounding by nursing staff.

## 2024-08-11 ENCOUNTER — APPOINTMENT (OUTPATIENT)
Dept: GENERAL RADIOLOGY | Facility: HOSPITAL | Age: 79
End: 2024-08-11
Attending: INTERNAL MEDICINE
Payer: MEDICARE

## 2024-08-11 LAB
ANION GAP SERPL CALC-SCNC: 4 MMOL/L (ref 0–18)
ATRIAL RATE: 174 BPM
BASOPHILS # BLD AUTO: 0.01 X10(3) UL (ref 0–0.2)
BASOPHILS NFR BLD AUTO: 0.2 %
BUN BLD-MCNC: 14 MG/DL (ref 9–23)
BUN/CREAT SERPL: 22.2 (ref 10–20)
CALCIUM BLD-MCNC: 9.4 MG/DL (ref 8.7–10.4)
CHLORIDE SERPL-SCNC: 105 MMOL/L (ref 98–112)
CO2 SERPL-SCNC: 32 MMOL/L (ref 21–32)
CREAT BLD-MCNC: 0.63 MG/DL
DEPRECATED RDW RBC AUTO: 59.5 FL (ref 35.1–46.3)
EGFRCR SERPLBLD CKD-EPI 2021: 91 ML/MIN/1.73M2 (ref 60–?)
EOSINOPHIL # BLD AUTO: 0.09 X10(3) UL (ref 0–0.7)
EOSINOPHIL NFR BLD AUTO: 1.8 %
ERYTHROCYTE [DISTWIDTH] IN BLOOD BY AUTOMATED COUNT: 16.1 % (ref 11–15)
GLUCOSE BLD-MCNC: 100 MG/DL (ref 70–99)
HCT VFR BLD AUTO: 32.4 %
HGB BLD-MCNC: 11.1 G/DL
IMM GRANULOCYTES # BLD AUTO: 0.02 X10(3) UL (ref 0–1)
IMM GRANULOCYTES NFR BLD: 0.4 %
LYMPHOCYTES # BLD AUTO: 1.01 X10(3) UL (ref 1–4)
LYMPHOCYTES NFR BLD AUTO: 20.2 %
MCH RBC QN AUTO: 34.8 PG (ref 26–34)
MCHC RBC AUTO-ENTMCNC: 34.3 G/DL (ref 31–37)
MCV RBC AUTO: 101.6 FL
MONOCYTES # BLD AUTO: 0.56 X10(3) UL (ref 0.1–1)
MONOCYTES NFR BLD AUTO: 11.2 %
NEUTROPHILS # BLD AUTO: 3.31 X10 (3) UL (ref 1.5–7.7)
NEUTROPHILS # BLD AUTO: 3.31 X10(3) UL (ref 1.5–7.7)
NEUTROPHILS NFR BLD AUTO: 66.2 %
OSMOLALITY SERPL CALC.SUM OF ELEC: 293 MOSM/KG (ref 275–295)
P AXIS: 71 DEGREES
P-R INTERVAL: 172 MS
PLATELET # BLD AUTO: 237 10(3)UL (ref 150–450)
POTASSIUM SERPL-SCNC: 4.2 MMOL/L (ref 3.5–5.1)
Q-T INTERVAL: 330 MS
QRS DURATION: 104 MS
QTC CALCULATION (BEZET): 472 MS
R AXIS: -46 DEGREES
RBC # BLD AUTO: 3.19 X10(6)UL
SODIUM SERPL-SCNC: 141 MMOL/L (ref 136–145)
T AXIS: 87 DEGREES
VENTRICULAR RATE: 123 BPM
WBC # BLD AUTO: 5 X10(3) UL (ref 4–11)

## 2024-08-11 PROCEDURE — 71045 X-RAY EXAM CHEST 1 VIEW: CPT | Performed by: INTERNAL MEDICINE

## 2024-08-11 RX ORDER — SODIUM CHLORIDE 9 MG/ML
INJECTION, SOLUTION INTRAVENOUS CONTINUOUS
Status: DISCONTINUED | OUTPATIENT
Start: 2024-08-12 | End: 2024-08-12

## 2024-08-11 NOTE — PROGRESS NOTES
[facility]  part of [facility] Hospitalist Progress Note     Annmarie Levin Patient Status:  Inpatient    1945 MRN [id number]   Location [facility]5W Attending Chay Adams MD   Hosp Day # 6 PCP LLOYD ANDREWS MD     Chief Complaint:   Chief Complaint   Patient presents with    Shortness Of Breath    Chest Pain Angina        Subjective:     Patient seen sitting in chair.  Son at bedside.  Patient denies acute events overnight.  Patient reports facial swelling improved today.  Patient states shortness of breath similar to previous.    Objective:      Vital signs:  Vitals:    24 0045 24 0633 24 0700 24 0852   BP: 113/62 120/69  140/70   BP Location: Left arm Left arm  Left arm   Pulse: 86 58 67 66   Resp: 18 18  18   Temp: 98 °F (36.7 °C) 97.5 °F (36.4 °C)  97.8 °F (36.6 °C)   TempSrc: Oral Oral  Oral   SpO2: 95% 95%  93%   Weight:       Height:           Intake/Output Summary (Last 24 hours) at 2024 1234  Last data filed at 2024 1233  Gross per 24 hour   Intake 462 ml   Output 29 ml   Net 433 ml           Physical Exam:    GENERAL:  Awake and alert, in no acute distress.  CHEST: Right-sided chest tube in place.  Chest wall/chest crepitus going up into neck and right upper extremity, mild improvement  HEART:  Regular rhythm, regular rate  LUNGS:  Air entry was decreased, worse over the right.  No increased work of breathing or wheezes   ABDOMEN: Soft and non-tender.    PSYCHIATRIC: Normal mood    Diagnostic Data:    Labs:    Recent Labs   Lab 24  0514 08/10/24  0452 24  0444   WBC 7.0 6.4 5.0   HGB 10.9* 11.8* 11.1*   .5* 100.0 101.6*   .0 266.0 237.0       Recent Labs   Lab 24  0512 08/10/24  0452 24  0444   * 101* 100*   BUN 14 13 14   CREATSERUM 0.72 0.68 0.63   CA 9.3 9.5 9.4    142 141   K 4.4 4.2 4.2    103 105   CO2 33.0* 34.0* 32.0           Estimated Creatinine  Clearance: 59.1 mL/min (based on SCr of 0.63 mg/dL).    No results for input(s): \"PTP\", \"INR\" in the last 168 hours.         COVID-19  Lab Results   Component Value Date    COVID19 Not Detected 10/14/2022       Pro-Calcitonin  No results for input(s): \"PCT\" in the last 168 hours.    Cardiac  No results for input(s): \"TROP\", \"PBNP\" in the last 168 hours.    Inflammatory Markers  Recent Labs   Lab 08/04/24 2011   DDIMER 0.81*       Culture:  No results found for this visit on 08/04/24.    XR CHEST AP PORTABLE  (CPT=71045)    Result Date: 8/11/2024  CONCLUSION:  1. Persistent right-sided apical pneumothorax; a right-sided pigtail chest tube remains in place.  2. Extensive subcutaneous emphysema throughout the chest wall, right worse than left.    Dictated by (CST): Nicho Lynch MD on 8/11/2024 at 9:01 AM     Finalized by (CST): Nicho Lynch MD on 8/11/2024 at 9:05 AM          XR CHEST AP PORTABLE  (CPT=71045)    Result Date: 8/10/2024  CONCLUSION:  1. Pigtail right chest tube projects at the right perihilar region.  Stable moderate approximate 2.9 cm thickness right apical pneumothorax.  Stable extensive subcutaneous emphysema throughout the right greater than left chest wall and lower neck. 2. Lesser incidental findings as above.   A preliminary report was issued by the [facility] Radiology teleradiology service. There are no major discrepancies.  elm-remote  Dictated by (CST): Mario Pino MD on 8/10/2024 at 10:23 AM     Finalized by (CST): Mario Pino MD on 8/10/2024 at 10:25 AM          IR CHEST TUBE    Addendum Date: 8/9/2024    CORRECTION Corrected on: 8/09/2024;   PROCEDURE: IR CHEST TUBE EXCHANGE  INDICATIONS:  Spontaneous pneumothorax post chest tube insertion in the ED on 08/04/2024.  Subsequent dislodgement with subcutaneous emphysema, post removal and new chest tube insertion by IR on 08/08/2024.  Now with worsening pneumothorax.  Here for check and exchange.  (S): Melissa   ANESTHESIA/SEDATION: Level of anesthesia/sedation: Moderate sedation (conscious sedation) Anesthesia/sedation administered by: Nurse or other independent trained observer who has no other duties with continuous monitoring of the patient's level of consciousness and physiologic status, under the personal supervision of the attending physician. Total intra-service sedation time: 10 min  FLUOROSCOPY TIME:  0.8 min AIR KERMA:  2.5 mGy  ESTIMATED BLOOD LOSS: Less than 5 mL  COMPLICATIONS: None  FINDINGS:  Informed consent was obtained.  The right chest and existing tube were sterilely prepped and draped.   fluoroscopy demonstrated a large right pneumothorax.  The existing chest tube was severely kinked and retracted to the chest wall.  Local lidocaine was administered.  The tube was cut.  Under fluoroscopic guidance, a wire was advanced through the tube into the right chest.  The tube was removed over the wire.  A new 12 Indian drainage catheter was advanced over the wire and formed in the apex of the chest.  There was immediate return of air from the catheter.  The catheter was secured to the skin with sutures and attached to a collection chamber.  CONCLUSION:  Existing right chest tube is kinked and retracted to chest wall.  Exchanged for new 12 Indian chest tube and repositioned into apex.  Maintain chest tube on water seal.  Daily chest x-rays.     Dictated by (CST): Wayne Torres MD on 8/09/2024 at 6:11 PM     Finalized by (CST): Wayne Torres MD on 8/09/2024 at 6:17 PM    Dictated by (CST): Wayne Torres MD on 8/09/2024 at 7:35 PM     Finalized by (CST): Wayne Torres MD on 8/09/2024 at 7:35 PM              Result Date: 8/9/2024  CONCLUSION:  Existing right chest tube is kinked and retracted to chest wall.  Exchanged for new 12 Indian chest tube and repositioned into apex.  Maintain chest tube on water seal.  Daily chest x-rays.     Dictated by (CST): Wayne Torres MD on 8/09/2024  at 6:11 PM     Finalized by (CST): Wayne Torres MD on 8/09/2024 at 6:17 PM          XR CHEST AP PORTABLE  (CPT=71045)    Result Date: 8/9/2024  CONCLUSION:  1. Enlarging right upper and new moderate sized right basal pneumothorax as described above.  Repositioning of the right-sided pigtail chest tube now located in the right mid chest.  Extensive emphysema in the subcutaneous tissues of the right neck and right lateral chest wall has increased significantly since previous study.  Correlate clinically.  2. Results regarding the enlarging right-sided pneumothorax were called the nursing floor by technologist Camlila.    Dictated by (CST): Buck Jimenez MD on 8/09/2024 at 8:39 AM     Finalized by (CST): Buck Jimenez MD on 8/09/2024 at 8:43 AM          IR CHEST TUBE    Result Date: 8/9/2024  CONCLUSION:  1. Fluoroscopic guided placement of small bore chest tube for evacuation of recurrent right-sided pneumothorax.    Dictated by (CST): Robert Montoya MD on 8/09/2024 at 8:15 AM     Finalized by (CST): Robert Montoya MD on 8/09/2024 at 8:17 AM           EKG 12 Lead    Result Date: 8/11/2024  Sinus tachycardia Incomplete right bundle branch block Left anterior fascicular block LVH with repolarization abnormality ( R in aVL , [place] product ) Cannot rule out Septal infarct (cited on or before 04-AUG-2024) Abnormal ECG When compared with ECG of 04-AUG-2024 19:12, Questionable change in initial forces of Septal leads Confirmed by WAYNE PEOPLES ([id number]) on 8/11/2024 8:20:25 AM     Medications:    [START ON 8/12/2024] ceFAZolin  2 g Intravenous Q8H    metoprolol tartrate  12.5 mg Oral 2x Daily(Beta Blocker)    ipratropium-albuterol  3 mL Nebulization 2 times daily    docusate sodium  100 mg Oral BID    montelukast  10 mg Oral Nightly    pantoprazole  40 mg Oral QAM AC    fluticasone-salmeterol  1 puff Inhalation BID    [Held by provider] heparin  5,000 Units Subcutaneous 2 times per day       Assessment & Plan:       Spontaneous pneumothorax  Nonhealing  -Chest x-ray 8/8 reviewed.  Noted enlarging of right upper lobe pneumothorax.  Also described malposition of chest tube.    -Status post IR replacement of chest tube on 8/8.  -Chest x-ray 8/9 reviewed.  Noted an enlarging right upper and new moderate size right basilar pneumothorax.  Also describing extensive emphysema in the subcutaneous tissues.  -Chest x-ray 810 reviewed.  Noted no significant change.  - Status post removal and replacement of chest tube, currently with third chest tube.  -Continue supplemental O2  -Pain control as needed, close monitoring with narcotics  -Pulmonology on consult, appreciate further recommendations.  -As pneumothorax remains present, CV surgery consulted.  Planning for VATS/pleurodesis procedure on 8/12.  Appreciate further recommendations     COPD with possible exacerbation  -Initially treated with IV Solu-Medrol, transitioned to p.o. steroids.  -Continue DuoNebs as needed   -Continue home inhalers.  -Pulmonology on consult, appreciate further recommendation.      Obesity with obstructive sleep apnea  -BMI 34.    -CPAP deferred due to PTX    Chronic respiratory failure  -Patient on intermittent supplemental O2 at home  -Mostly with exertion  -Continue supplemental O2      Plan of care discussed with patient and son at bedside . Discussed management/test result(s) with Rn    Quality:  DVT Prophylaxis: Heparin  CODE status: Full  Estimated date of discharge: TBD  Discharge is dependent on: clinical stability    52 minutes spent discussing with other providers, examining patient, obtaining history, reviewing medical records, interpreting and communicating test results/imaging, ordering tests/medications, discussing plan of care and documenting information.      Chay Adams MD          This note was prepared using Dragon Medical voice recognition dictation software. As a result errors may occur. When identified these errors have been  corrected. While every attempt is made to correct errors during dictation discrepancies may still exist

## 2024-08-11 NOTE — PHYSICAL THERAPY NOTE
Physical Therapy Contact Note    Orders received and chart reviewed. Attempted to see patient for Physical Therapy services at 1100. Patient not available secondary to patient politely requesting to watch mass on television, requesting therapy re-attempt later. Per RN pt has been ambulating SBA with nursing to/from bathroom. Will re-attempt pending patient availability/appropriateness as schedule allows, otherwise will re-schedule visit. Per chart review, pt with anticipated surgery tomorrow, will require updated PT orders post-operatively.    Rhea Cardenas, PT, DPT  [facility]  Rehab Services - Physical Therapy  [id number]

## 2024-08-11 NOTE — PROGRESS NOTES
Thoracic Surgery    Surgery added on for tomorrow.  Time not known at this point. Orders placed.    Brian Orona M.D.  Thoracic Surgery  Pager: [phone]

## 2024-08-11 NOTE — PROGRESS NOTES
Pulmonary Progress Note        NAME: Annmarie Levin - ROOM: 511511-A - MRN: [id number] - Age: 78 year old - : 1945    Past Medical History:    Arrhythmia    hx of rapid heartbeat     Perales's esophagus    Perales's esophagus    path consistent; EGD 2007; Arpit Ellison    Breast cancer (HCC)     - [id number];  Mastectomy/Chemo/Radiation.,right breast    Breast cancer of upper-outer quadrant of right female breast (HCC)     - [id number];  Mastectomy/Chemo/Radiation     COPD (chronic obstructive pulmonary disease) ([facility])    Environmental allergies    Esophageal reflux    History of stomach ulcers    hx    Hyperlipidemia    Malignant neoplasm of upper-outer quadrant of right breast in female, estrogen receptor negative (HCC)     - H5D4mM3;  Mastectomy/Chemo/Radiation     Mixed hyperlipidemia    Organic cardiac disease    Osteoarthritis    bilateral knee    Osteoarthritis of left knee    Synvisc-One injection, Mark Johnson    Osteoarthritis of right foot    steroid injection of subtalar joint of the right foot under fluoroscopy guidance; Mark Johnson    Osteoarthritis of right knee    Synvisc-One, Mark Johnson    Osteopenia of multiple sites    PONV (postoperative nausea and vomiting)    Rosacea    Skin cancer    multiple carcinomas of the skin    Sleep apnea    CPAP     Sleep apnea    CPAP titration; weight reduction, CPAP 15 cm of water, aboidance of respiratory depressants including alcohol and sedatives    Visual impairment         SUBJECTIVE: no new respiratory complaints     OBJECTIVE:    Oxygen Therapy  SpO2: 95 %  O2 Device: Nasal cannula  Mode: AutoPAP  O2 Flow Rate (L/min): 3 L/min  Pulse Oximetry Type: Intermittent                  Intake/Output Summary (Last 24 hours) at 2024 0807  Last data filed at 2024 0400  Gross per 24 hour   Intake 410 ml   Output 24 ml   Net 386 ml       Scheduled Medication:   metoprolol tartrate  12.5 mg Oral 2x Daily(Beta Blocker)     ipratropium-albuterol  3 mL Nebulization 2 times daily    docusate sodium  100 mg Oral BID    predniSONE  40 mg Oral Daily with breakfast    montelukast  10 mg Oral Nightly    pantoprazole  40 mg Oral QAM AC    fluticasone-salmeterol  1 puff Inhalation BID    [Held by provider] heparin  5,000 Units Subcutaneous 2 times per day     Continuous Infusing Medication:    /69 (BP Location: Left arm)   Pulse 58   Temp 97.5 °F (36.4 °C) (Oral)   Resp 18   Ht 5' 2\" (1.575 m)   Wt 189 lb 6.4 oz (85.9 kg)   LMP 10/18/1991 (Approximate)   SpO2 95%   BMI 34.64 kg/m²   General:  Alert, no distress   Lungs:   Clear to auscultation bilaterally.  Chest tube in place with air leak   Heart:   Regular rhythm    Abdomen:   Soft, non-tender, non distended   Extremities:  no cyanosis or edema.   Neurologic: Oriented  Times 3       Recent Labs   Lab 08/09/24  0514 08/10/24  0452 08/11/24  0444   RBC 3.21* 3.42* 3.19*   HGB 10.9* 11.8* 11.1*   HCT 32.9* 34.2* 32.4*   .5* 100.0 101.6*   MCH 34.0 34.5* 34.8*   MCHC 33.1 34.5 34.3   RDW 16.1* 16.2* 16.1*   NEPRELIM 4.91 4.53 3.31   WBC 7.0 6.4 5.0   .0 266.0 237.0         Recent Labs   Lab 08/09/24  0512 08/10/24  0452 08/11/24  0444   * 101* 100*   BUN 14 13 14   CREATSERUM 0.72 0.68 0.63   EGFRCR 86 90 91   CA 9.3 9.5 9.4    142 141   K 4.4 4.2 4.2    103 105   CO2 33.0* 34.0* 32.0       ASSESSMENT/PLAN:    Non resolving pneumothorax still has air leak    - plan for surgical repair, discussed with Dr. Orona 8/10  Chronic respiratory failure  - at baseline uses supplemental O2 at 2-3 LPM as needed  COPD - exacerbation - resolved   - prednisone last dose today  - continue home inhalers  NADJA  - CPAP deferred due to PTX  Mediastinal mass - likely benign  - outpatient follow-up  Advanced directives -  full   Dispo - stable   - will follow    Darren Campos MD

## 2024-08-11 NOTE — PLAN OF CARE
Problem: Patient Centered Care  Goal: Patient preferences are identified and integrated in the patient's plan of care  Description: Interventions:  - What would you like us to know as we care for you? From home alone  - Provide timely, complete, and accurate information to patient/family  - Incorporate patient and family knowledge, values, beliefs, and cultural backgrounds into the planning and delivery of care  - Encourage patient/family to participate in care and decision-making at the level they choose  - Honor patient and family perspectives and choices  Outcome: Progressing     Problem: Patient/Family Goals  Goal: Patient/Family Long Term Goal  Description: Patient's Long Term Goal: discharge    Interventions:  - - Monitor vitals  - Monitor appropriate labs  - Pain management  - Follow MD order  - Diagnostics per order  - Update/Informing patient and family on plan of care  - Discharge planning  - See additional Care Plan goals for specific interventions  Outcome: Progressing  Goal: Patient/Family Short Term Goal  Description: Patient's Short Term Goal: feel better    Interventions:   - - Monitor vitals  - Monitor appropriate labs  - Pain management  - Follow MD order  - Diagnostics per order  - Update/Informing patient and family on plan of care  - Discharge planning  - See additional Care Plan goals for specific interventions  Outcome: Progressing     Problem: PAIN - ADULT  Goal: Verbalizes/displays adequate comfort level or patient's stated pain goal  Description: INTERVENTIONS:  - Encourage pt to monitor pain and request assistance  - Assess pain using appropriate pain scale  - Administer analgesics based on type and severity of pain and evaluate response  - Implement non-pharmacological measures as appropriate and evaluate response  - Consider cultural and social influences on pain and pain management  - Manage/alleviate anxiety  - Utilize distraction and/or relaxation techniques  - Monitor for opioid side  effects  - Notify MD/LIP if interventions unsuccessful or patient reports new pain  - Anticipate increased pain with activity and pre-medicate as appropriate  Outcome: Progressing     Problem: CARDIOVASCULAR - ADULT  Goal: Maintains optimal cardiac output and hemodynamic stability  Description: INTERVENTIONS:  - Monitor vital signs, rhythm, and trends  - Monitor for bleeding, hypotension and signs of decreased cardiac output  - Evaluate effectiveness of vasoactive medications to optimize hemodynamic stability  - Monitor arterial and/or venous puncture sites for bleeding and/or hematoma  - Assess quality of pulses, skin color and temperature  - Assess for signs of decreased coronary artery perfusion - ex. Angina  - Evaluate fluid balance, assess for edema, trend weights  Outcome: Progressing     Problem: RESPIRATORY - ADULT  Goal: Achieves optimal ventilation and oxygenation  Description: INTERVENTIONS:  - Assess for changes in respiratory status  - Assess for changes in mentation and behavior  - Position to facilitate oxygenation and minimize respiratory effort  - Oxygen supplementation based on oxygen saturation or ABGs  - Provide Smoking Cessation handout, if applicable  - Encourage broncho-pulmonary hygiene including cough, deep breathe, Incentive Spirometry  - Assess the need for suctioning and perform as needed  - Assess and instruct to report SOB or any respiratory difficulty  - Respiratory Therapy support as indicated  - Manage/alleviate anxiety  - Monitor for signs/symptoms of CO2 retention  Outcome: Progressing     A&O x 4. No complaints of pain. Chest tube in place, monitoring per orders. Plan for VATS/pleurodesis procedure 08/12 with Dr. Orona. Consent obtained and placed in patient's chart. Plan for NPO after midnight, will endorse to night shift RN. Frequent rounding preformed and safety precautions observed. Call light and personal items within reach.

## 2024-08-11 NOTE — PLAN OF CARE
Problem: Patient Centered Care  Goal: Patient preferences are identified and integrated in the patient's plan of care  Description: Interventions:  - What would you like us to know as we care for you? From home alone  - Provide timely, complete, and accurate information to patient/family  - Incorporate patient and family knowledge, values, beliefs, and cultural backgrounds into the planning and delivery of care  - Encourage patient/family to participate in care and decision-making at the level they choose  - Honor patient and family perspectives and choices  Outcome: Progressing     Problem: Patient/Family Goals  Goal: Patient/Family Long Term Goal  Description: Patient's Long Term Goal: discharge    Interventions:  - - Monitor vitals  - Monitor appropriate labs  - Pain management  - Follow MD order  - Diagnostics per order  - Update/Informing patient and family on plan of care  - Discharge planning  - See additional Care Plan goals for specific interventions  Outcome: Progressing  Goal: Patient/Family Short Term Goal  Description: Patient's Short Term Goal: feel better    Interventions:   - - Monitor vitals  - Monitor appropriate labs  - Pain management  - Follow MD order  - Diagnostics per order  - Update/Informing patient and family on plan of care  - Discharge planning  - See additional Care Plan goals for specific interventions  Outcome: Progressing     Problem: Patient/Family Goals  Goal: Patient/Family Short Term Goal  Description: Patient's Short Term Goal: feel better    Interventions:   - - Monitor vitals  - Monitor appropriate labs  - Pain management  - Follow MD order  - Diagnostics per order  - Update/Informing patient and family on plan of care  - Discharge planning  - See additional Care Plan goals for specific interventions  Outcome: Progressing     Problem: PAIN - ADULT  Goal: Verbalizes/displays adequate comfort level or patient's stated pain goal  Description: INTERVENTIONS:  - Encourage pt to  monitor pain and request assistance  - Assess pain using appropriate pain scale  - Administer analgesics based on type and severity of pain and evaluate response  - Implement non-pharmacological measures as appropriate and evaluate response  - Consider cultural and social influences on pain and pain management  - Manage/alleviate anxiety  - Utilize distraction and/or relaxation techniques  - Monitor for opioid side effects  - Notify MD/LIP if interventions unsuccessful or patient reports new pain  - Anticipate increased pain with activity and pre-medicate as appropriate  Outcome: Progressing     Problem: CARDIOVASCULAR - ADULT  Goal: Maintains optimal cardiac output and hemodynamic stability  Description: INTERVENTIONS:  - Monitor vital signs, rhythm, and trends  - Monitor for bleeding, hypotension and signs of decreased cardiac output  - Evaluate effectiveness of vasoactive medications to optimize hemodynamic stability  - Monitor arterial and/or venous puncture sites for bleeding and/or hematoma  - Assess quality of pulses, skin color and temperature  - Assess for signs of decreased coronary artery perfusion - ex. Angina  - Evaluate fluid balance, assess for edema, trend weights  Outcome: Progressing     Problem: RESPIRATORY - ADULT  Goal: Achieves optimal ventilation and oxygenation  Description: INTERVENTIONS:  - Assess for changes in respiratory status  - Assess for changes in mentation and behavior  - Position to facilitate oxygenation and minimize respiratory effort  - Oxygen supplementation based on oxygen saturation or ABGs  - Provide Smoking Cessation handout, if applicable  - Encourage broncho-pulmonary hygiene including cough, deep breathe, Incentive Spirometry  - Assess the need for suctioning and perform as needed  - Assess and instruct to report SOB or any respiratory difficulty  - Respiratory Therapy support as indicated  - Manage/alleviate anxiety  - Monitor for signs/symptoms of CO2  retention  Outcome: Progressing

## 2024-08-12 ENCOUNTER — ANESTHESIA (OUTPATIENT)
Dept: SURGERY | Facility: HOSPITAL | Age: 79
End: 2024-08-12
Payer: MEDICARE

## 2024-08-12 ENCOUNTER — APPOINTMENT (OUTPATIENT)
Dept: GENERAL RADIOLOGY | Facility: HOSPITAL | Age: 79
End: 2024-08-12
Attending: STUDENT IN AN ORGANIZED HEALTH CARE EDUCATION/TRAINING PROGRAM
Payer: MEDICARE

## 2024-08-12 ENCOUNTER — ANESTHESIA EVENT (OUTPATIENT)
Dept: SURGERY | Facility: HOSPITAL | Age: 79
End: 2024-08-12
Payer: MEDICARE

## 2024-08-12 LAB
ANION GAP SERPL CALC-SCNC: 3 MMOL/L (ref 0–18)
ANTIBODY SCREEN: NEGATIVE
BASOPHILS # BLD AUTO: 0.02 X10(3) UL (ref 0–0.2)
BASOPHILS NFR BLD AUTO: 0.3 %
BUN BLD-MCNC: 13 MG/DL (ref 9–23)
BUN/CREAT SERPL: 19.4 (ref 10–20)
CALCIUM BLD-MCNC: 9.5 MG/DL (ref 8.7–10.4)
CHLORIDE SERPL-SCNC: 104 MMOL/L (ref 98–112)
CO2 SERPL-SCNC: 34 MMOL/L (ref 21–32)
CREAT BLD-MCNC: 0.67 MG/DL
DEPRECATED RDW RBC AUTO: 61.4 FL (ref 35.1–46.3)
EGFRCR SERPLBLD CKD-EPI 2021: 90 ML/MIN/1.73M2 (ref 60–?)
EOSINOPHIL # BLD AUTO: 0.08 X10(3) UL (ref 0–0.7)
EOSINOPHIL NFR BLD AUTO: 1.3 %
ERYTHROCYTE [DISTWIDTH] IN BLOOD BY AUTOMATED COUNT: 15.9 % (ref 11–15)
GLUCOSE BLD-MCNC: 106 MG/DL (ref 70–99)
HCT VFR BLD AUTO: 34.8 %
HGB BLD-MCNC: 11.4 G/DL
IMM GRANULOCYTES # BLD AUTO: 0.02 X10(3) UL (ref 0–1)
IMM GRANULOCYTES NFR BLD: 0.3 %
LYMPHOCYTES # BLD AUTO: 1.25 X10(3) UL (ref 1–4)
LYMPHOCYTES NFR BLD AUTO: 20.8 %
MCH RBC QN AUTO: 34.2 PG (ref 26–34)
MCHC RBC AUTO-ENTMCNC: 32.8 G/DL (ref 31–37)
MCV RBC AUTO: 104.5 FL
MONOCYTES # BLD AUTO: 0.68 X10(3) UL (ref 0.1–1)
MONOCYTES NFR BLD AUTO: 11.3 %
NEUTROPHILS # BLD AUTO: 3.97 X10 (3) UL (ref 1.5–7.7)
NEUTROPHILS # BLD AUTO: 3.97 X10(3) UL (ref 1.5–7.7)
NEUTROPHILS NFR BLD AUTO: 66 %
OSMOLALITY SERPL CALC.SUM OF ELEC: 293 MOSM/KG (ref 275–295)
PLATELET # BLD AUTO: 250 10(3)UL (ref 150–450)
POTASSIUM SERPL-SCNC: 4.1 MMOL/L (ref 3.5–5.1)
RBC # BLD AUTO: 3.33 X10(6)UL
RH BLOOD TYPE: NEGATIVE
RH BLOOD TYPE: NEGATIVE
SODIUM SERPL-SCNC: 141 MMOL/L (ref 136–145)
WBC # BLD AUTO: 6 X10(3) UL (ref 4–11)

## 2024-08-12 PROCEDURE — 0BJ08ZZ INSPECTION OF TRACHEOBRONCHIAL TREE, VIA NATURAL OR ARTIFICIAL OPENING ENDOSCOPIC: ICD-10-PCS | Performed by: THORACIC SURGERY (CARDIOTHORACIC VASCULAR SURGERY)

## 2024-08-12 PROCEDURE — 0BBN4ZX EXCISION OF RIGHT PLEURA, PERCUTANEOUS ENDOSCOPIC APPROACH, DIAGNOSTIC: ICD-10-PCS | Performed by: THORACIC SURGERY (CARDIOTHORACIC VASCULAR SURGERY)

## 2024-08-12 PROCEDURE — 3E0T3BZ INTRODUCTION OF ANESTHETIC AGENT INTO PERIPHERAL NERVES AND PLEXI, PERCUTANEOUS APPROACH: ICD-10-PCS | Performed by: THORACIC SURGERY (CARDIOTHORACIC VASCULAR SURGERY)

## 2024-08-12 PROCEDURE — 3E0L4GC INTRODUCTION OF OTHER THERAPEUTIC SUBSTANCE INTO PLEURAL CAVITY, PERCUTANEOUS ENDOSCOPIC APPROACH: ICD-10-PCS | Performed by: THORACIC SURGERY (CARDIOTHORACIC VASCULAR SURGERY)

## 2024-08-12 PROCEDURE — 71045 X-RAY EXAM CHEST 1 VIEW: CPT | Performed by: STUDENT IN AN ORGANIZED HEALTH CARE EDUCATION/TRAINING PROGRAM

## 2024-08-12 PROCEDURE — 0BBC4ZZ EXCISION OF RIGHT UPPER LUNG LOBE, PERCUTANEOUS ENDOSCOPIC APPROACH: ICD-10-PCS | Performed by: THORACIC SURGERY (CARDIOTHORACIC VASCULAR SURGERY)

## 2024-08-12 DEVICE — ECHELON 60MM REINFORCEMENT
Type: IMPLANTABLE DEVICE | Site: CHEST | Status: FUNCTIONAL
Brand: ECHLEON

## 2024-08-12 RX ORDER — HYDROMORPHONE HYDROCHLORIDE 1 MG/ML
0.4 INJECTION, SOLUTION INTRAMUSCULAR; INTRAVENOUS; SUBCUTANEOUS EVERY 5 MIN PRN
Status: DISCONTINUED | OUTPATIENT
Start: 2024-08-12 | End: 2024-08-12 | Stop reason: HOSPADM

## 2024-08-12 RX ORDER — POLYETHYLENE GLYCOL 3350 17 G/17G
17 POWDER, FOR SOLUTION ORAL DAILY PRN
Status: DISCONTINUED | OUTPATIENT
Start: 2024-08-12 | End: 2024-08-15

## 2024-08-12 RX ORDER — OXYCODONE HYDROCHLORIDE 5 MG/1
5 TABLET ORAL EVERY 4 HOURS PRN
Status: DISCONTINUED | OUTPATIENT
Start: 2024-08-12 | End: 2024-08-15

## 2024-08-12 RX ORDER — ONDANSETRON 2 MG/ML
4 INJECTION INTRAMUSCULAR; INTRAVENOUS EVERY 6 HOURS PRN
Status: DISCONTINUED | OUTPATIENT
Start: 2024-08-12 | End: 2024-08-12 | Stop reason: HOSPADM

## 2024-08-12 RX ORDER — NALOXONE HYDROCHLORIDE 0.4 MG/ML
0.08 INJECTION, SOLUTION INTRAMUSCULAR; INTRAVENOUS; SUBCUTANEOUS AS NEEDED
Status: DISCONTINUED | OUTPATIENT
Start: 2024-08-12 | End: 2024-08-12 | Stop reason: HOSPADM

## 2024-08-12 RX ORDER — SODIUM CHLORIDE 9 MG/ML
INJECTION, SOLUTION INTRAVENOUS CONTINUOUS
Status: DISCONTINUED | OUTPATIENT
Start: 2024-08-12 | End: 2024-08-13

## 2024-08-12 RX ORDER — CALCIUM CARBONATE 500 MG/1
1000 TABLET, CHEWABLE ORAL 3 TIMES DAILY PRN
Status: DISCONTINUED | OUTPATIENT
Start: 2024-08-12 | End: 2024-08-15

## 2024-08-12 RX ORDER — SODIUM CHLORIDE, SODIUM LACTATE, POTASSIUM CHLORIDE, CALCIUM CHLORIDE 600; 310; 30; 20 MG/100ML; MG/100ML; MG/100ML; MG/100ML
INJECTION, SOLUTION INTRAVENOUS CONTINUOUS
Status: DISCONTINUED | OUTPATIENT
Start: 2024-08-12 | End: 2024-08-12 | Stop reason: HOSPADM

## 2024-08-12 RX ORDER — BISACODYL 10 MG
10 SUPPOSITORY, RECTAL RECTAL
Status: DISCONTINUED | OUTPATIENT
Start: 2024-08-12 | End: 2024-08-15

## 2024-08-12 RX ORDER — SENNOSIDES 8.6 MG
17.2 TABLET ORAL NIGHTLY PRN
Status: DISCONTINUED | OUTPATIENT
Start: 2024-08-12 | End: 2024-08-15

## 2024-08-12 RX ORDER — HYDROMORPHONE HYDROCHLORIDE 1 MG/ML
0.8 INJECTION, SOLUTION INTRAMUSCULAR; INTRAVENOUS; SUBCUTANEOUS EVERY 2 HOUR PRN
Status: DISCONTINUED | OUTPATIENT
Start: 2024-08-12 | End: 2024-08-15

## 2024-08-12 RX ORDER — ONDANSETRON 2 MG/ML
INJECTION INTRAMUSCULAR; INTRAVENOUS AS NEEDED
Status: DISCONTINUED | OUTPATIENT
Start: 2024-08-12 | End: 2024-08-12 | Stop reason: SURG

## 2024-08-12 RX ORDER — SODIUM CHLORIDE, SODIUM LACTATE, POTASSIUM CHLORIDE, CALCIUM CHLORIDE 600; 310; 30; 20 MG/100ML; MG/100ML; MG/100ML; MG/100ML
INJECTION, SOLUTION INTRAVENOUS CONTINUOUS PRN
Status: DISCONTINUED | OUTPATIENT
Start: 2024-08-12 | End: 2024-08-12 | Stop reason: SURG

## 2024-08-12 RX ORDER — ENEMA 19; 7 G/133ML; G/133ML
1 ENEMA RECTAL ONCE AS NEEDED
Status: DISCONTINUED | OUTPATIENT
Start: 2024-08-12 | End: 2024-08-15

## 2024-08-12 RX ORDER — ONDANSETRON 2 MG/ML
4 INJECTION INTRAMUSCULAR; INTRAVENOUS EVERY 6 HOURS PRN
Status: DISCONTINUED | OUTPATIENT
Start: 2024-08-12 | End: 2024-08-15

## 2024-08-12 RX ORDER — HEPARIN SODIUM 5000 [USP'U]/ML
5000 INJECTION, SOLUTION INTRAVENOUS; SUBCUTANEOUS EVERY 12 HOURS SCHEDULED
Status: DISCONTINUED | OUTPATIENT
Start: 2024-08-12 | End: 2024-08-15

## 2024-08-12 RX ORDER — LABETALOL HYDROCHLORIDE 5 MG/ML
5 INJECTION, SOLUTION INTRAVENOUS EVERY 5 MIN PRN
Status: DISCONTINUED | OUTPATIENT
Start: 2024-08-12 | End: 2024-08-12 | Stop reason: HOSPADM

## 2024-08-12 RX ORDER — DEXAMETHASONE SODIUM PHOSPHATE 4 MG/ML
VIAL (ML) INJECTION AS NEEDED
Status: DISCONTINUED | OUTPATIENT
Start: 2024-08-12 | End: 2024-08-12 | Stop reason: SURG

## 2024-08-12 RX ORDER — HYDROMORPHONE HYDROCHLORIDE 1 MG/ML
0.6 INJECTION, SOLUTION INTRAMUSCULAR; INTRAVENOUS; SUBCUTANEOUS EVERY 5 MIN PRN
Status: DISCONTINUED | OUTPATIENT
Start: 2024-08-12 | End: 2024-08-12 | Stop reason: HOSPADM

## 2024-08-12 RX ORDER — HYDROMORPHONE HYDROCHLORIDE 1 MG/ML
0.2 INJECTION, SOLUTION INTRAMUSCULAR; INTRAVENOUS; SUBCUTANEOUS EVERY 5 MIN PRN
Status: DISCONTINUED | OUTPATIENT
Start: 2024-08-12 | End: 2024-08-12 | Stop reason: HOSPADM

## 2024-08-12 RX ORDER — HYDROMORPHONE HYDROCHLORIDE 1 MG/ML
0.4 INJECTION, SOLUTION INTRAMUSCULAR; INTRAVENOUS; SUBCUTANEOUS EVERY 2 HOUR PRN
Status: DISCONTINUED | OUTPATIENT
Start: 2024-08-12 | End: 2024-08-15

## 2024-08-12 RX ORDER — METOCLOPRAMIDE HYDROCHLORIDE 5 MG/ML
10 INJECTION INTRAMUSCULAR; INTRAVENOUS EVERY 8 HOURS PRN
Status: DISCONTINUED | OUTPATIENT
Start: 2024-08-12 | End: 2024-08-15

## 2024-08-12 RX ORDER — LIDOCAINE HYDROCHLORIDE 10 MG/ML
INJECTION, SOLUTION EPIDURAL; INFILTRATION; INTRACAUDAL; PERINEURAL AS NEEDED
Status: DISCONTINUED | OUTPATIENT
Start: 2024-08-12 | End: 2024-08-12 | Stop reason: SURG

## 2024-08-12 RX ORDER — ALBUTEROL SULFATE 2.5 MG/3ML
2.5 SOLUTION RESPIRATORY (INHALATION) AS NEEDED
Status: DISCONTINUED | OUTPATIENT
Start: 2024-08-12 | End: 2024-08-12 | Stop reason: HOSPADM

## 2024-08-12 RX ORDER — METOCLOPRAMIDE HYDROCHLORIDE 5 MG/ML
10 INJECTION INTRAMUSCULAR; INTRAVENOUS EVERY 8 HOURS PRN
Status: DISCONTINUED | OUTPATIENT
Start: 2024-08-12 | End: 2024-08-12 | Stop reason: HOSPADM

## 2024-08-12 RX ORDER — ROCURONIUM BROMIDE 10 MG/ML
INJECTION, SOLUTION INTRAVENOUS AS NEEDED
Status: DISCONTINUED | OUTPATIENT
Start: 2024-08-12 | End: 2024-08-12 | Stop reason: SURG

## 2024-08-12 RX ORDER — BUPIVACAINE HYDROCHLORIDE AND EPINEPHRINE 2.5; 5 MG/ML; UG/ML
INJECTION, SOLUTION INFILTRATION; PERINEURAL AS NEEDED
Status: DISCONTINUED | OUTPATIENT
Start: 2024-08-12 | End: 2024-08-12 | Stop reason: HOSPADM

## 2024-08-12 RX ORDER — OXYCODONE HYDROCHLORIDE 5 MG/1
10 TABLET ORAL EVERY 4 HOURS PRN
Status: DISCONTINUED | OUTPATIENT
Start: 2024-08-12 | End: 2024-08-15

## 2024-08-12 RX ORDER — HYDRALAZINE HYDROCHLORIDE 20 MG/ML
5 INJECTION INTRAMUSCULAR; INTRAVENOUS EVERY 10 MIN PRN
Status: DISCONTINUED | OUTPATIENT
Start: 2024-08-12 | End: 2024-08-12 | Stop reason: HOSPADM

## 2024-08-12 RX ORDER — ACETAMINOPHEN 10 MG/ML
1000 INJECTION, SOLUTION INTRAVENOUS EVERY 6 HOURS
Status: DISCONTINUED | OUTPATIENT
Start: 2024-08-12 | End: 2024-08-13

## 2024-08-12 RX ADMIN — SODIUM CHLORIDE: 9 INJECTION, SOLUTION INTRAVENOUS at 12:56:00

## 2024-08-12 RX ADMIN — SODIUM CHLORIDE, SODIUM LACTATE, POTASSIUM CHLORIDE, CALCIUM CHLORIDE: 600; 310; 30; 20 INJECTION, SOLUTION INTRAVENOUS at 11:45:00

## 2024-08-12 RX ADMIN — SODIUM CHLORIDE, SODIUM LACTATE, POTASSIUM CHLORIDE, CALCIUM CHLORIDE: 600; 310; 30; 20 INJECTION, SOLUTION INTRAVENOUS at 12:57:00

## 2024-08-12 RX ADMIN — SODIUM CHLORIDE, SODIUM LACTATE, POTASSIUM CHLORIDE, CALCIUM CHLORIDE: 600; 310; 30; 20 INJECTION, SOLUTION INTRAVENOUS at 13:23:00

## 2024-08-12 RX ADMIN — DEXAMETHASONE SODIUM PHOSPHATE 4 MG: 4 MG/ML VIAL (ML) INJECTION at 12:18:00

## 2024-08-12 RX ADMIN — SODIUM CHLORIDE: 9 INJECTION, SOLUTION INTRAVENOUS at 12:10:00

## 2024-08-12 RX ADMIN — ROCURONIUM BROMIDE 50 MG: 10 INJECTION, SOLUTION INTRAVENOUS at 11:59:00

## 2024-08-12 RX ADMIN — ONDANSETRON 4 MG: 2 INJECTION INTRAMUSCULAR; INTRAVENOUS at 13:06:00

## 2024-08-12 RX ADMIN — LIDOCAINE HYDROCHLORIDE 40 MG: 10 INJECTION, SOLUTION EPIDURAL; INFILTRATION; INTRACAUDAL; PERINEURAL at 11:58:00

## 2024-08-12 NOTE — PROGRESS NOTES
[facility] Patient Status:  Inpatient    1945 MRN [id number]   Location [facility]5W Attending Chay Adams MD   Hosp Day # 7 PCP LLOYD ANDREWS MD        SUBJECTIVE:No new complaints.     OBJECTIVE:  Patient Vitals for the past 24 hrs:   BP Temp Temp src Pulse Resp SpO2   24 0404 132/57 97.6 °F (36.4 °C) Oral 72 20 96 %   24 0015 139/61 97.6 °F (36.4 °C) Oral 73 20 96 %   24 2202 128/57 -- -- 72 23 95 %   24 2027 121/66 97.9 °F (36.6 °C) Oral 67 22 94 %   24 1900 -- -- -- 61 -- --   24 1726 127/61 98.3 °F (36.8 °C) Oral 85 20 93 %   24 1611 108/56 98.1 °F (36.7 °C) Oral 79 18 94 %   24 1236 124/66 97.8 °F (36.6 °C) Oral 93 18 95 %   24 0852 140/70 97.8 °F (36.6 °C) Oral 66 18 93 %   24 0700 -- -- -- 67 -- --   24 0633 120/69 97.5 °F (36.4 °C) Oral 58 18 95 %     O2 requirement: 2L     I/O last 3 completed shifts:  In: 1271 [P.O.:1271]  Out: 54 [Urine:2; Chest Tube:52]  I/O this shift:  In: 350 [P.O.:350]  Out: 17 [Urine:1; Chest Tube:16]    Medications:  Reviewed in EMR.     Physical Exam:   General: no distress   Throat: No thrush noted.   Lungs: clear   Heart: Regular rate and rhythm, normal S1S2, no murmur.   Abdomen: soft, non-tender, non-distended, positive BS.   Extremity: no edema, no cyanosis   Skin: No rashes or lesions.       Lab Results   Component Value Date    WBC 6.0 2024    HGB 11.4 2024    HCT 34.8 2024    .0 2024    CREATSERUM 0.67 2024    BUN 13 2024     2024    K 4.1 2024     2024    CO2 34.0 2024     2024    CA 9.5 2024          Imaging: Reviewed.     ASSESSMENT/PLAN:     Non resolving pneumothorax still has air leak    - plan for surgical repair, discussed with Dr. 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     Rancho Piña MD  8/12/2024  6:22 AM

## 2024-08-12 NOTE — BRIEF OP NOTE
Pre-Operative Diagnosis: Pneumothorax, right [J93.9]     Post-Operative Diagnosis: Pneumothorax, right [J93.9]      Procedure Performed:   RIGHT VIDEO-ASSISTED THORACOSCOPY, BLEB RESECTION AND TALC PLEURODESIS ; PLEURAL BIOPSY    Surgeons and Role:     * Brian Orona Jr., MD - Primary    Assistant(s):  PA: Sherrie Carter PA-C     Surgical Findings: small pleural effusion, apical adhesions, apical bleb with active air leak, some pleural nodularity biopsied      Specimen:   ID Type Source Tests Collected by Time Destination   1 : 1. right pleural biopsy Tissue Pleura right SURGICAL PATHOLOGY TISSUE Brian Orona Jr., MD 8/12/2024 1243    2 : 2. right pleural biopsy #2 Tissue Pleura right SURGICAL PATHOLOGY TISSUE Brian Orona Jr., MD 8/12/2024 1248    3 : 3. right upper lobe wedge Tissue Lung right SURGICAL PATHOLOGY Brian Felton Jr., MD 8/12/2024 1259         Estimated Blood Loss: 5cc    Disposition: PACU to floor    Sherrie Carter PA-C  8/12/2024  1:24 PM

## 2024-08-12 NOTE — PROGRESS NOTES
Attempted follow up PT treatment. Patient with Non resolving pneumothorax with plan for surgical repair this date. Will require updated PT orders s/p procedure.     Thank you,  Theodora Yang, PT, DPT

## 2024-08-12 NOTE — ANESTHESIA PROCEDURE NOTES
Airway  Date/Time: 8/12/2024 12:01 PM  Urgency: elective    Airway not difficult    General Information and Staff    Patient location during procedure: OR  Anesthesiologist: Rancho Quintana MD  Performed: anesthesiologist   Performed by: Rancho Quintana MD  Authorized by: Rancho Quintana MD      Indications and Patient Condition  Indications for airway management: anesthesia  Sedation level: deep  Preoxygenated: yes  Patient position: sniffing  MILS maintained throughout  Mask difficulty assessment: 1 - vent by mask    Final Airway Details  Final airway type: endotracheal airway      Successful airway: ETT - double lumen left  Cuffed: yes   Successful intubation technique: Video laryngoscopy  Facilitating devices/methods: intubating stylet  Endotracheal tube insertion site: oral  Blade type: Patiño video laryngoscope.  Blade size: #3  ETT DL size (fr): 37    Cormack-Lehane Classification: grade IIA - partial view of glottis  Placement verified by: bronchoscopy and capnometry   Measured from: lips  ETT to lips (cm): 25  Number of attempts at approach: 1

## 2024-08-12 NOTE — PROGRESS NOTES
[facility]     Annmarie Levin Patient Status:  Inpatient    1945 MRN [id number]   Location [facility]5W Attending Chay Adams MD   Hosp Day # 7 PCP LLOYD ANDREWS MD     Chief Complaint:   Chief Complaint   Patient presents with    Shortness Of Breath    Chest Pain Angina        Subjective:     Patient seen sitting in chair.  Son at bedside.  Patient denies acute events overnight.  Patient reports feeling better today.  Reports shortness of breath improving/similar to previous.  Patient states decrease in facial swelling.      Objective:      Vital signs:  Vitals:    24 0015 24 0404 24 0622   BP:  139/61 132/57 135/64   BP Location:  Left arm Left arm Left arm   Pulse:  73 72 62   Resp:  20 20 20   Temp: 97.9 °F (36.6 °C) 97.6 °F (36.4 °C) 97.6 °F (36.4 °C)    TempSrc: Oral Oral Oral    SpO2:  96% 96% 95%   Weight:       Height:           Intake/Output Summary (Last 24 hours) at 2024 0920  Last data filed at 2024 0754  Gross per 24 hour   Intake 1211 ml   Output 44 ml   Net 1167 ml           Physical Exam:    GENERAL:  Awake and alert, in no acute distress.  CHEST: Right-sided chest tube in place.  Chest wall/chest crepitus remains present, but improving  HEART:  Regular rhythm, regular rate  LUNGS:  Air entry was decreased, worse over the right.  No increased work of breathing or wheezes   ABDOMEN: Soft and non-tender.    PSYCHIATRIC: Normal mood    Diagnostic Data:    Labs:    Recent Labs   Lab 08/10/24  0452 08/11/24  0444 08/12/24  0457   WBC 6.4 5.0 6.0   HGB 11.8* 11.1* 11.4*   .0 101.6* 104.5*   .0 237.0 250.0       Recent Labs   Lab 08/10/24  0452 08/11/24  0444 08/12/24  0457   * 100* 106*   BUN 13 14 13   CREATSERUM 0.68 0.63 0.67   CA 9.5 9.4 9.5    141 141   K 4.2 4.2 4.1    105 104   CO2 34.0* 32.0 34.0*           Estimated Creatinine  Clearance: 55.6 mL/min (based on SCr of 0.67 mg/dL).    No results for input(s): \"PTP\", \"INR\" in the last 168 hours.         COVID-19  Lab Results   Component Value Date    COVID19 Not Detected 10/14/2022       Pro-Calcitonin  No results for input(s): \"PCT\" in the last 168 hours.    Cardiac  No results for input(s): \"TROP\", \"PBNP\" in the last 168 hours.    Inflammatory Markers  No results for input(s): \"CRP\", \"SONIA\", \"LDH\", \"DDIMER\" in the last 168 hours.      Culture:  No results found for this visit on 08/04/24.    XR CHEST AP PORTABLE  (CPT=71045)    Result Date: 8/12/2024  CONCLUSION:   The right apical pneumothorax is slightly decreased in size compared to the prior exam.  No new abnormality.    Dictated by (CST): Wagner Matthews MD on 8/12/2024 at 7:49 AM     Finalized by (CST): Wagner Matthews MD on 8/12/2024 at 7:52 AM          XR CHEST AP PORTABLE  (CPT=71045)    Result Date: 8/11/2024  CONCLUSION:  1. Persistent right-sided apical pneumothorax; a right-sided pigtail chest tube remains in place.  2. Extensive subcutaneous emphysema throughout the chest wall, right worse than left.    Dictated by (CST): Nicho Lynch MD on 8/11/2024 at 9:01 AM     Finalized by (CST): Nicho Lynch MD on 8/11/2024 at 9:05 AM          XR CHEST AP PORTABLE  (CPT=71045)    Result Date: 8/10/2024  CONCLUSION:  1. Pigtail right chest tube projects at the right perihilar region.  Stable moderate approximate 2.9 cm thickness right apical pneumothorax.  Stable extensive subcutaneous emphysema throughout the right greater than left chest wall and lower neck. 2. Lesser incidental findings as above.   A preliminary report was issued by the [facility] teleradiology service. There are no major discrepancies.  elm-remote  Dictated by (CST): Mario Pino MD on 8/10/2024 at 10:23 AM     Finalized by (CST): Mario Pino MD on 8/10/2024 at 10:25 AM          IR CHEST TUBE    Addendum Date: 8/9/2024    CORRECTION Corrected on:  8/09/2024;   PROCEDURE: IR CHEST TUBE EXCHANGE  INDICATIONS:  Spontaneous pneumothorax post chest tube insertion in the ED on 08/04/2024.  Subsequent dislodgement with subcutaneous emphysema, post removal and new chest tube insertion by IR on 08/08/2024.  Now with worsening pneumothorax.  Here for check and exchange.  (S): Melissa  ANESTHESIA/SEDATION: Level of anesthesia/sedation: Moderate sedation (conscious sedation) Anesthesia/sedation administered by: Nurse or other independent trained observer who has no other duties with continuous monitoring of the patient's level of consciousness and physiologic status, under the personal supervision of the attending physician. Total intra-service sedation time: 10 min  FLUOROSCOPY TIME:  0.8 min AIR KERMA:  2.5 mGy  ESTIMATED BLOOD LOSS: Less than 5 mL  COMPLICATIONS: None  FINDINGS:  Informed consent was obtained.  The right chest and existing tube were sterilely prepped and draped.   fluoroscopy demonstrated a large right pneumothorax.  The existing chest tube was severely kinked and retracted to the chest wall.  Local lidocaine was administered.  The tube was cut.  Under fluoroscopic guidance, a wire was advanced through the tube into the right chest.  The tube was removed over the wire.  A new 12 Faroese drainage catheter was advanced over the wire and formed in the apex of the chest.  There was immediate return of air from the catheter.  The catheter was secured to the skin with sutures and attached to a collection chamber.  CONCLUSION:  Existing right chest tube is kinked and retracted to chest wall.  Exchanged for new 12 Faroese chest tube and repositioned into apex.  Maintain chest tube on water seal.  Daily chest x-rays.     Dictated by (CST): Wayne Torres MD on 8/09/2024 at 6:11 PM     Finalized by (CST): Wayne Torres MD on 8/09/2024 at 6:17 PM    Dictated by (CST): Wayne Torres MD on 8/09/2024 at 7:35 PM     Finalized by (CST):  Wayne Torres MD on 8/09/2024 at 7:35 PM              Result Date: 8/9/2024  CONCLUSION:  Existing right chest tube is kinked and retracted to chest wall.  Exchanged for new 12 French chest tube and repositioned into apex.  Maintain chest tube on water seal.  Daily chest x-rays.     Dictated by (CST): Wayne Torres MD on 8/09/2024 at 6:11 PM     Finalized by (CST): Wayne Torres MD on 8/09/2024 at 6:17 PM           EKG 12 Lead    Result Date: 8/11/2024  Sinus tachycardia Incomplete right bundle branch block Left anterior fascicular block LVH with repolarization abnormality ( R in aVL , Jeanerette product ) Cannot rule out Septal infarct (cited on or before 04-AUG-2024) Abnormal ECG When compared with ECG of 04-AUG-2024 19:12, Questionable change in initial forces of Septal leads Confirmed by WAYNE PEOPLES ([id number]) on 8/11/2024 8:20:25 AM     Medications:    ceFAZolin  2 g Intravenous Q8H    metoprolol tartrate  12.5 mg Oral 2x Daily(Beta Blocker)    ipratropium-albuterol  3 mL Nebulization 2 times daily    docusate sodium  100 mg Oral BID    montelukast  10 mg Oral Nightly    pantoprazole  40 mg Oral QAM AC    fluticasone-salmeterol  1 puff Inhalation BID    [Held by provider] heparin  5,000 Units Subcutaneous 2 times per day       Assessment & Plan:      Spontaneous pneumothorax  Nonhealing  -Chest x-ray 8/8 reviewed.  Noted enlarging of right upper lobe pneumothorax.  Also described malposition of chest tube.    -Status post IR replacement of chest tube on 8/8.  -Chest x-ray 8/9 reviewed.  Noted an enlarging right upper and new moderate size right basilar pneumothorax.  Also describing extensive emphysema in the subcutaneous tissues.  -Chest x-ray 810 reviewed.  Noted no significant change.  - Status post removal and replacement of chest tube, currently with third chest tube.  -Continue supplemental O2  -Pain control as needed, close monitoring with narcotics  -Pulmonology on consult, appreciate further  recommendations.  -CV surgery consulted.  Planning for VATS/pleurodesis procedure on 8/12.  Appreciate further recommendations     COPD with possible exacerbation  -Initially treated with IV Solu-Medrol, transitioned to p.o. steroids.  Completing course  -Continue DuoNebs as needed   -Continue home inhalers.  -Pulmonology on consult, appreciate further recommendation.      Obesity with obstructive sleep apnea  -BMI 34.    -CPAP deferred due to PTX    Chronic respiratory failure  -Patient on intermittent supplemental O2 at home  -Mostly with exertion  -Continue supplemental O2      Plan of care discussed with patient and son at bedside . Discussed management/test result(s) with Rn    Quality:  DVT Prophylaxis: Heparin  CODE status: Full  Estimated date of discharge: TBD  Discharge is dependent on: clinical stability    53 minutes spent discussing with other providers, examining patient, obtaining history, reviewing medical records, interpreting and communicating test results/imaging, ordering tests/medications, discussing plan of care and documenting information.      Chay Adams MD          This note was prepared using Dragon Medical voice recognition dictation software. As a result errors may occur. When identified these errors have been corrected. While every attempt is made to correct errors during dictation discrepancies may still exist

## 2024-08-12 NOTE — PLAN OF CARE
Bed is in lowest setting, locked with bed alarm on and has call light within reach. Patient is able to make needs known. Monitoring of chest tube at least every 4 hours. Patient NPO at midnight for planned procedure.       Problem: Patient Centered Care  Goal: Patient preferences are identified and integrated in the patient's plan of care  Description: Interventions:  - What would you like us to know as we care for you? From home alone  - Provide timely, complete, and accurate information to patient/family  - Incorporate patient and family knowledge, values, beliefs, and cultural backgrounds into the planning and delivery of care  - Encourage patient/family to participate in care and decision-making at the level they choose  - Honor patient and family perspectives and choices  Outcome: Progressing     Problem: Patient/Family Goals  Goal: Patient/Family Long Term Goal  Description: Patient's Long Term Goal: discharge    Interventions:  - - Monitor vitals  - Monitor appropriate labs  - Pain management  - Follow MD order  - Diagnostics per order  - Update/Informing patient and family on plan of care  - Discharge planning  - See additional Care Plan goals for specific interventions  Outcome: Progressing  Goal: Patient/Family Short Term Goal  Description: Patient's Short Term Goal: feel better    Interventions:   - - Monitor vitals  - Monitor appropriate labs  - Pain management  - Follow MD order  - Diagnostics per order  - Update/Informing patient and family on plan of care  - Discharge planning  - See additional Care Plan goals for specific interventions  Outcome: Progressing     Problem: PAIN - ADULT  Goal: Verbalizes/displays adequate comfort level or patient's stated pain goal  Description: INTERVENTIONS:  - Encourage pt to monitor pain and request assistance  - Assess pain using appropriate pain scale  - Administer analgesics based on type and severity of pain and evaluate response  - Implement non-pharmacological  measures as appropriate and evaluate response  - Consider cultural and social influences on pain and pain management  - Manage/alleviate anxiety  - Utilize distraction and/or relaxation techniques  - Monitor for opioid side effects  - Notify MD/LIP if interventions unsuccessful or patient reports new pain  - Anticipate increased pain with activity and pre-medicate as appropriate  Outcome: Progressing     Problem: CARDIOVASCULAR - ADULT  Goal: Maintains optimal cardiac output and hemodynamic stability  Description: INTERVENTIONS:  - Monitor vital signs, rhythm, and trends  - Monitor for bleeding, hypotension and signs of decreased cardiac output  - Evaluate effectiveness of vasoactive medications to optimize hemodynamic stability  - Monitor arterial and/or venous puncture sites for bleeding and/or hematoma  - Assess quality of pulses, skin color and temperature  - Assess for signs of decreased coronary artery perfusion - ex. Angina  - Evaluate fluid balance, assess for edema, trend weights  Outcome: Progressing     Problem: RESPIRATORY - ADULT  Goal: Achieves optimal ventilation and oxygenation  Description: INTERVENTIONS:  - Assess for changes in respiratory status  - Assess for changes in mentation and behavior  - Position to facilitate oxygenation and minimize respiratory effort  - Oxygen supplementation based on oxygen saturation or ABGs  - Provide Smoking Cessation handout, if applicable  - Encourage broncho-pulmonary hygiene including cough, deep breathe, Incentive Spirometry  - Assess the need for suctioning and perform as needed  - Assess and instruct to report SOB or any respiratory difficulty  - Respiratory Therapy support as indicated  - Manage/alleviate anxiety  - Monitor for signs/symptoms of CO2 retention  Outcome: Progressing

## 2024-08-12 NOTE — ANESTHESIA PREPROCEDURE EVALUATION
Anesthesia PreOp Note    HPI:     Annmarie Levin is a 78 year old female who presents for preoperative consultation requested by: Adwoa Shanks, Brian CASTELLON MD    Date of Surgery: 8/12/2024    Procedure(s):  RIGHT VIDEO-ASSISTED THORACOSCOPY, BLEB RESECTION AND PLEURODESIS, POSSIBLE THORACOTOMY  Indication: Pneumothorax, right [J93.9]    Relevant Problems   No relevant active problems       NPO:                         History Review:  Patient Active Problem List    Diagnosis Date Noted    Pneumothorax, unspecified type 08/04/2024    Mediastinal mass 06/20/2023    Acute pain of right knee 09/06/2022    Fatigue 11/04/2020    Mixed hyperlipidemia 10/29/2020    Nontraumatic tear of right rotator cuff 08/18/2020    Status post mastectomy, right 10/26/2019    Soft tissue mass 09/16/2019    Chronic right shoulder pain 08/22/2019    Impingement syndrome of right shoulder 08/22/2019    Arthritis of shoulder region 08/22/2019    Right carpal tunnel syndrome 08/01/2018    Adult BMI 45.0-49.9 kg/sq m ([facility]) 09/28/2017    GERD (gastroesophageal reflux disease) 11/02/2015    Osteopenia of multiple sites 10/29/2014    DJD (degenerative joint disease) 10/29/2014    Sleep apnea 10/29/2012    Malignant neoplasm of upper-outer quadrant of right breast in female, estrogen receptor negative ([facility]) 04/30/2012       Past Medical History:    Arrhythmia    hx of rapid heartbeat     Perales's esophagus    Perales's esophagus    path consistent; EGD 07/17/2007; Mohamed Sait    Breast cancer ([facility])    1992 - [id number];  Mastectomy/Chemo/Radiation.,right breast    Breast cancer of upper-outer quadrant of right female breast ([facility])    1992 - [id number];  Mastectomy/Chemo/Radiation     COPD (chronic obstructive pulmonary disease) ([facility])    Environmental allergies    Esophageal reflux    History of stomach ulcers    hx    Hyperlipidemia    Malignant neoplasm of upper-outer quadrant of right breast in female, estrogen receptor negative ([facility])    1992 -  [id number];  Mastectomy/Chemo/Radiation     Mixed hyperlipidemia    Organic cardiac disease    Osteoarthritis    bilateral knee    Osteoarthritis of left knee    Synvisc-Ander sinha, Mark Johnson    Osteoarthritis of right foot    steroid injection of subtalar joint of the right foot under fluoroscopy guidance; Mark Johnson    Osteoarthritis of right knee    Synvisc-Ander, Mark Johnson    Osteopenia of multiple sites    PONV (postoperative nausea and vomiting)    Rosacea    Skin cancer    multiple carcinomas of the skin    Sleep apnea    CPAP 2012    Sleep apnea    CPAP titration; weight reduction, CPAP 15 cm of water, aboidance of respiratory depressants including alcohol and sedatives    Visual impairment       Past Surgical History:   Procedure Laterality Date    Carpal tunnel release      Chemotherapy Right 1992    Cholecystectomy      Colonoscopy  05/25/2005    Perales's/constipation; EGD/colonoscopy;diverticulosis, internal hemorrhoids, gastritis, hiatal hernia, Perales's esophagus - continue PPI therapy     Colonoscopy  06/28/2011    change in bowel habits / Perales's; EGD colonoscopy biopsy; diverticula sigmoid colon, internal hemorrhoids, mild gastritis, histal hernia with reflux, mild esophagitis, no evidence of Perales's /  Arpit Ellison     Colonoscopy      Fracture surgery      General sleep study  05/09/2012    obesity, hypersomnolence snoring; sleep study; obstructive sleep apnea, favorable response to CPAP 15 cm of water / Yoseph Everett f/u 6/6/12    Angelito localization wire 1 site left (cpt=19281) Left 1993    pre ca excisional bx    Mastectomy right Right     Other surgical history      Radiation right Right 1992    Upper gi endoscopy,exam  2002    Perales's esophagus; EGD 12/10/2002; Arpit Ellison       Medications Prior to Admission   Medication Sig Dispense Refill Last Dose    GEMTESA 75 MG Oral Tab Take 1 tablet by mouth daily.   8/5/2024    fluticasone propionate 50 MCG/ACT Nasal Suspension 2 sprays  by Nasal route daily.   8/5/2024    SYMBICORT 160-4.5 MCG/ACT Inhalation Aerosol Inhale 2 puffs into the lungs 2 (two) times daily.   8/5/2024    albuterol 108 (90 Base) MCG/ACT Inhalation Aero Soln Inhale 2 puffs into the lungs every 4 (four) hours as needed.   8/5/2024    multivitamin Oral Tab Take 1 tablet by mouth daily with breakfast.   8/5/2024    estradiol (ESTRACE) 0.1 MG/GM Vaginal Cream Apply 1/2 gram vaginally 2 times per week. Use at bedtime. 42.5 g 3 Past Week    Cholecalciferol (VITAMIN D) 50 MCG (2000 UT) Oral Cap Take 1 capsule (2,000 Units total) by mouth daily.   8/4/2024    Vitamin B-12 (VITAMIN B12) 500 MCG Oral Tab Take 1 tablet (500 mcg total) by mouth daily.   8/4/2024    Multiple Vitamins-Minerals (MULTI FOR HER 50+) Oral Tab Take  by mouth daily.   8/5/2024    Vaginal Moisturizer (LUVENA PREBIOTIC LUBRICANT VA) Place  vaginally. Indications: One every four days, per pt's medication list   Past Week    Omeprazole 40 MG Oral Capsule Delayed Release Take 1 capsule (40 mg total) by mouth daily.   8/4/2024    aspirin 325 MG Oral Tab Take  by mouth.   8/4/2024    montelukast 10 MG Oral Tab Take by mouth.   8/5/2024    Calcium Carbonate-Vitamin D 600-200 MG-UNIT Oral Cap Take  by mouth.   8/4/2024    tiotropium 18 MCG Inhalation Cap Inhale into the lungs daily.       azithromycin 250 MG Oral Tab Take 1 tablet (250 mg total) by mouth daily.       Mirabegron ER (MYRBETRIQ) 8 MG/ML Oral Suspension Reconstituted ER Take by mouth.       PATIENT SUPPLIED MEDICATION 2L of O2 as needed        Current Facility-Administered Medications Ordered in Epic   Medication Dose Route Frequency Provider Last Rate Last Admin    ceFAZolin (Ancef) 2g in 10mL IV syringe premix  2 g Intravenous Q8H Brian Orona Jr., MD        sodium chloride 0.9% infusion   Intravenous Continuous Brian Orona Jr., MD 60 mL/hr at 08/12/24 0640 New Bag at 08/12/24 0640    glycerin-hypromellose- (Artificial Tears) 0.2-0.2-1 %  ophthalmic solution 1 drop  1 drop Both Eyes QID PRN Chay Adams MD   1 drop at 08/10/24 2127    metoprolol tartrate (Lopressor) partial tab 12.5 mg  12.5 mg Oral 2x Daily(Beta Blocker) Tootie Ye MD   12.5 mg at 08/12/24 0639    ipratropium-albuterol (Duoneb) 0.5-2.5 (3) MG/3ML inhalation solution 3 mL  3 mL Nebulization 2 times daily Argelia Grossman MD   3 mL at 08/11/24 1930    docusate sodium (Colace) cap 100 mg  100 mg Oral BID Chay Adams MD   100 mg at 08/11/24 0858    polyethylene glycol (PEG 3350) (Miralax) 17 g oral packet 17 g  17 g Oral Daily PRN Chay Adams MD        magnesium hydroxide (Milk of Magnesia) 400 MG/5ML oral suspension 30 mL  30 mL Oral Daily PRN Chay Adams MD   30 mL at 08/08/24 2141    bisacodyl (Dulcolax) 10 MG rectal suppository 10 mg  10 mg Rectal Daily PRN Chay Adams MD   10 mg at 08/09/24 0634    fleet enema (Fleet) 7-19 GM/118ML rectal enema 133 mL  1 enema Rectal Once PRN Chay Adams MD        montelukast (Singulair) tab 10 mg  10 mg Oral Nightly Argelia Grossman MD   10 mg at 08/11/24 2030    pantoprazole (Protonix) DR tab 40 mg  40 mg Oral QAM AC Argelia Grossman MD   40 mg at 08/12/24 0639    fluticasone-salmeterol (Advair Diskus) 500-50 MCG/ACT inhaler 1 puff  1 puff Inhalation BID Argelia Grossman MD   1 puff at 08/11/24 0858    ondansetron (Zofran) 4 MG/2ML injection 4 mg  4 mg Intravenous Q6H PRN Argelia Grossman MD   4 mg at 08/05/24 2220    acetaminophen (Tylenol) tab 650 mg  650 mg Oral Q6H PRN Argelia Grossman MD   650 mg at 08/10/24 2130    HYDROcodone-acetaminophen (Norco)  MG per tab 1 tablet  1 tablet Oral Q4H PRN Argelia Grossman MD   1 tablet at 08/11/24 2203    hydrALAzine (Apresoline) 20 mg/mL injection 10 mg  10 mg Intravenous Q4H PRN Argelia Grossman MD        [Held by provider] heparin (Porcine) 5000 UNIT/ML injection 5,000 Units  5,000 Units Subcutaneous 2 times per day Agrelia Grossman MD    5,000 Units at 08/08/24 0752    ipratropium-albuterol (Duoneb) 0.5-2.5 (3) MG/3ML inhalation solution 3 mL  3 mL Nebulization Q6H PRN Argelia Grossman MD        zolpidem (Ambien) tab 5 mg  5 mg Oral Nightly PRN Argelia Grossman MD        alum-mag hydroxide-simethicone (Maalox) 200-200-20 MG/5ML oral suspension 30 mL  30 mL Oral QID PRN Argelia Grossman MD        HYDROmorphone (Dilaudid) 1 MG/ML injection 0.5 mg  0.5 mg Intravenous Q2H PRN Argelia Grossman MD         No current Epic-ordered outpatient medications on file.       Allergies   Allergen Reactions    Sulfa Antibiotics FEVER    Adhesive Tape     Levaquin [Levofloxacin] RASH    Morphine NAUSEA ONLY     Other reaction(s): \"doesn't agree with me\"       Family History   Problem Relation Age of Onset    Heart Disease Other         grandfather    Stroke Other         aunt    Breast Cancer Sister 78        approx    Breast Cancer Self 46        approx    Breast Cancer Paternal Cousin Female 78        approx    Heart Disorder Father     Diabetes Mother      Social History     Socioeconomic History    Marital status:    Tobacco Use    Smoking status: Former    Smokeless tobacco: Former    Tobacco comments:     quit 25 yrs ago   Vaping Use    Vaping status: Never Used   Substance and Sexual Activity    Alcohol use: Yes     Alcohol/week: 2.5 standard drinks of alcohol     Types: 3 Standard drinks or equivalent per week     Comment: 3 drinks daily    Drug use: No       Available pre-op labs reviewed.  Lab Results   Component Value Date    WBC 6.0 08/12/2024    RBC 3.33 (L) 08/12/2024    HGB 11.4 (L) 08/12/2024    HCT 34.8 (L) 08/12/2024    .5 (H) 08/12/2024    MCH 34.2 (H) 08/12/2024    MCHC 32.8 08/12/2024    RDW 15.9 (H) 08/12/2024    .0 08/12/2024     Lab Results   Component Value Date     08/12/2024    K 4.1 08/12/2024     08/12/2024    CO2 34.0 (H) 08/12/2024    BUN 13 08/12/2024    CREATSERUM 0.67 08/12/2024     (H)  08/12/2024    CA 9.5 08/12/2024          Vital Signs:  Body mass index is 34.64 kg/m².   height is 1.575 m (5' 2\") and weight is 85.9 kg (189 lb 6.4 oz). Her oral temperature is 97.8 °F (36.6 °C). Her blood pressure is 138/68 and her pulse is 66. Her respiration is 20 and oxygen saturation is 92%.   Vitals:    08/12/24 0015 08/12/24 0404 08/12/24 0622 08/12/24 0954   BP: 139/61 132/57 135/64 138/68   Pulse: 73 72 62 66   Resp: 20 20 20 20   Temp: 97.6 °F (36.4 °C) 97.6 °F (36.4 °C)  97.8 °F (36.6 °C)   TempSrc: Oral Oral  Oral   SpO2: 96% 96% 95% 92%   Weight:       Height:            Anesthesia Evaluation     Patient summary reviewed and Nursing notes reviewed    History of anesthetic complications   Airway   TM distance: <3 FB  Neck ROM: full  Dental      Pulmonary    (+) COPD, sleep apnea, decreased breath sounds    ROS comment: Recurring PneumoTx/Bleb  Cardiovascular   Exercise tolerance: poor  (+) peripheral edema    Rhythm: regular  Rate: normal    Neuro/Psych    (+)  neuromuscular disease,        GI/Hepatic/Renal    (+) GERD    Endo/Other      Comments: Obesity  Abdominal                  Anesthesia Plan:   ASA:  3  Plan:   General  Monitors and Lines:   Double Lumen -ETT, A-line and Additonal IV  Airway:  Video laryngoscope and Double lumen ETT  Informed Consent Plan and Risks Discussed With:  Patient and child/children  Use of Blood Products Discussed With:  Patient  Blood Product Use Consented    Discussed plan with:  Attending      I have informed Annmarie Levin and/or legal guardian or family member of the nature of the anesthetic plan, benefits, risks including possible dental damage if relevant, major complications, and any alternative forms of anesthetic management.   All of the patient's questions were answered to the best of my ability. The patient desires the anesthetic management as planned.  Rancho Quintana MD  8/12/2024 10:24 AM  Present on Admission:  **None**

## 2024-08-13 ENCOUNTER — APPOINTMENT (OUTPATIENT)
Dept: GENERAL RADIOLOGY | Facility: HOSPITAL | Age: 79
End: 2024-08-13
Attending: STUDENT IN AN ORGANIZED HEALTH CARE EDUCATION/TRAINING PROGRAM
Payer: MEDICARE

## 2024-08-13 LAB
ANION GAP SERPL CALC-SCNC: 2 MMOL/L (ref 0–18)
BASOPHILS # BLD AUTO: 0.02 X10(3) UL (ref 0–0.2)
BASOPHILS NFR BLD AUTO: 0.2 %
BUN BLD-MCNC: 12 MG/DL (ref 9–23)
BUN/CREAT SERPL: 16.2 (ref 10–20)
CALCIUM BLD-MCNC: 8.8 MG/DL (ref 8.7–10.4)
CHLORIDE SERPL-SCNC: 103 MMOL/L (ref 98–112)
CO2 SERPL-SCNC: 32 MMOL/L (ref 21–32)
CREAT BLD-MCNC: 0.74 MG/DL
DEPRECATED RDW RBC AUTO: 58 FL (ref 35.1–46.3)
EGFRCR SERPLBLD CKD-EPI 2021: 83 ML/MIN/1.73M2 (ref 60–?)
EOSINOPHIL # BLD AUTO: 0.04 X10(3) UL (ref 0–0.7)
EOSINOPHIL NFR BLD AUTO: 0.5 %
ERYTHROCYTE [DISTWIDTH] IN BLOOD BY AUTOMATED COUNT: 15.8 % (ref 11–15)
GLUCOSE BLD-MCNC: 106 MG/DL (ref 70–99)
HCT VFR BLD AUTO: 32.4 %
HGB BLD-MCNC: 11.1 G/DL
IMM GRANULOCYTES # BLD AUTO: 0.06 X10(3) UL (ref 0–1)
IMM GRANULOCYTES NFR BLD: 0.7 %
LYMPHOCYTES # BLD AUTO: 0.91 X10(3) UL (ref 1–4)
LYMPHOCYTES NFR BLD AUTO: 10.3 %
MCH RBC QN AUTO: 34.9 PG (ref 26–34)
MCHC RBC AUTO-ENTMCNC: 34.3 G/DL (ref 31–37)
MCV RBC AUTO: 101.9 FL
MONOCYTES # BLD AUTO: 1 X10(3) UL (ref 0.1–1)
MONOCYTES NFR BLD AUTO: 11.3 %
NEUTROPHILS # BLD AUTO: 6.82 X10 (3) UL (ref 1.5–7.7)
NEUTROPHILS # BLD AUTO: 6.82 X10(3) UL (ref 1.5–7.7)
NEUTROPHILS NFR BLD AUTO: 77 %
OSMOLALITY SERPL CALC.SUM OF ELEC: 284 MOSM/KG (ref 275–295)
PLATELET # BLD AUTO: 238 10(3)UL (ref 150–450)
POTASSIUM SERPL-SCNC: 4.4 MMOL/L (ref 3.5–5.1)
RBC # BLD AUTO: 3.18 X10(6)UL
SODIUM SERPL-SCNC: 137 MMOL/L (ref 136–145)
WBC # BLD AUTO: 8.9 X10(3) UL (ref 4–11)

## 2024-08-13 PROCEDURE — 71045 X-RAY EXAM CHEST 1 VIEW: CPT | Performed by: STUDENT IN AN ORGANIZED HEALTH CARE EDUCATION/TRAINING PROGRAM

## 2024-08-13 PROCEDURE — 99233 SBSQ HOSP IP/OBS HIGH 50: CPT | Performed by: INTERNAL MEDICINE

## 2024-08-13 RX ORDER — IPRATROPIUM BROMIDE AND ALBUTEROL SULFATE 2.5; .5 MG/3ML; MG/3ML
3 SOLUTION RESPIRATORY (INHALATION) EVERY 4 HOURS PRN
Status: DISCONTINUED | OUTPATIENT
Start: 2024-08-13 | End: 2024-08-15

## 2024-08-13 RX ORDER — ACETAMINOPHEN 325 MG/1
650 TABLET ORAL EVERY 6 HOURS PRN
Status: DISCONTINUED | OUTPATIENT
Start: 2024-08-13 | End: 2024-08-15

## 2024-08-13 NOTE — RESPIRATORY THERAPY NOTE
Patient unable to perform Incentive Spirometer at this time.     Incentive spirometer and CPAP held at this time due to PTX.

## 2024-08-13 NOTE — PROGRESS NOTES
[facility]  [facility]     Annmarie Levin Patient Status:  Inpatient    1945 MRN [id number]   Location [facility]5W Attending Chay Adams MD   Hosp Day # 8 PCP LLOYD ANDREWS MD     Chief Complaint:   Chief Complaint   Patient presents with    Shortness Of Breath    Chest Pain Angina        Subjective:     Patient seen sitting in chair.  Son at bedside.  Patient reported some increased pain overnight.  Improving today.  Patient denies current subjective shortness of breath.  Able to ambulate in [place] with assistance.    Objective:      Vital signs:  Vitals:    24 0500 24 0600 24 0630 24 1100   BP:  137/55  127/57   BP Location:  Left arm  Left arm   Pulse:  66  81   Resp:  15  19   Temp:       TempSrc:       SpO2: 92% 98%  94%   Weight:   184 lb 11.9 oz (83.8 kg)    Height:           Intake/Output Summary (Last 24 hours) at 2024 1246  Last data filed at 2024 0800  Gross per 24 hour   Intake 1790 ml   Output 1040 ml   Net 750 ml           Physical Exam:    GENERAL:  Awake and alert, in no acute distress.  CHEST: Right-sided chest tube in place.  Chest wall/chest crepitus improving  HEART:  Regular rhythm, regular rate  LUNGS:  Air entry was decreased, worse over the right.  No increased work of breathing or wheezes   ABDOMEN: Soft and non-tender.    PSYCHIATRIC: Normal mood    Diagnostic Data:    Labs:    Recent Labs   Lab 24  0444 24  0457 24  0450   WBC 5.0 6.0 8.9   HGB 11.1* 11.4* 11.1*   .6* 104.5* 101.9*   .0 250.0 238.0       Recent Labs   Lab 24  0444 24  0457 24  0451   * 106* 106*   BUN 14 13 12   CREATSERUM 0.63 0.67 0.74   CA 9.4 9.5 8.8    141 137   K 4.2 4.1 4.4    104 103   CO2 32.0 34.0* 32.0           Estimated Creatinine Clearance: 50.4 mL/min (based on SCr of 0.74 mg/dL).    No results for input(s): \"PTP\", \"INR\" in  the last 168 hours.         COVID-19  Lab Results   Component Value Date    COVID19 Not Detected 10/14/2022       Pro-Calcitonin  No results for input(s): \"PCT\" in the last 168 hours.    Cardiac  No results for input(s): \"TROP\", \"PBNP\" in the last 168 hours.    Inflammatory Markers  No results for input(s): \"CRP\", \"SONIA\", \"LDH\", \"DDIMER\" in the last 168 hours.      Culture:  No results found for this visit on 08/04/24.    XR CHEST AP PORTABLE  (CPT=71045)    Result Date: 8/13/2024  CONCLUSION:  1. Cardiomegaly.  Tortuous thoracic aorta. 2. Emphysematous changes with chronic pulmonary interstitium. 3. Persistent small right apical pneumothorax measuring 10-15% similar prior exam.  Pigtail catheter drain has been replaced with the chest tube in the upper right hemithorax.  Chronic right apical pleural parenchymal scarring with chronic right apical traction bronchiectasis.  Chronic bronchopleural fistula cannot be excluded.    Dictated by (CST): Earl Barber MD on 8/13/2024 at 12:16 PM     Finalized by (CST): Earl Barber MD on 8/13/2024 at 12:23 PM          XR CHEST AP PORTABLE  (CPT=71045)    Result Date: 8/12/2024  CONCLUSION:   The right apical pneumothorax is slightly decreased in size compared to the prior exam.  No new abnormality.    Dictated by (CST): Wagner Matthews MD on 8/12/2024 at 7:49 AM     Finalized by (CST): Wagner Matthews MD on 8/12/2024 at 7:52 AM          XR CHEST AP PORTABLE  (CPT=71045)    Result Date: 8/11/2024  CONCLUSION:  1. Persistent right-sided apical pneumothorax; a right-sided pigtail chest tube remains in place.  2. Extensive subcutaneous emphysema throughout the chest wall, right worse than left.    Dictated by (CST): Nicho Lynch MD on 8/11/2024 at 9:01 AM     Finalized by (CST): Nicho Lynch MD on 8/11/2024 at 9:05 AM                 Medications:    acetaminophen  1,000 mg Intravenous Q6H    heparin  5,000 Units Subcutaneous 2 times per day    metoprolol tartrate   12.5 mg Oral 2x Daily(Beta Blocker)    montelukast  10 mg Oral Nightly    pantoprazole  40 mg Oral QAM AC    fluticasone-salmeterol  1 puff Inhalation BID       Assessment & Plan:      Spontaneous pneumothorax  Nonhealing  -Chest x-ray 8/8 reviewed.  Noted enlarging of right upper lobe pneumothorax.  Also described malposition of chest tube.    -Status post IR replacement of chest tube on 8/8.  -Chest x-ray 8/9 reviewed.  Noted an enlarging right upper and new moderate size right basilar pneumothorax.  Also describing extensive emphysema in the subcutaneous tissues.  -Chest x-ray 810 reviewed.  Noted no significant change.  - Status post removal and replacement of chest tube, currently with third chest tube.  -Continue supplemental O2  -Pain control as needed, close monitoring with narcotics  -Pulmonology on consult, appreciate further recommendations.  -CV surgery consulted.    -Right VATS with bleb resection and talc pleurodesis procedure on 8/12.    -Appreciate further recommendations     COPD with possible exacerbation  -Completed course of steroids.  -Continue DuoNebs as needed   -Continue home inhalers.  -Pulmonology on consult, appreciate further recommendation.      Obesity with obstructive sleep apnea  -BMI 34.    -CPAP deferred due to PTX    Chronic respiratory failure  -Patient on intermittent supplemental O2 at home  -Mostly with exertion  -Continue supplemental O2      Plan of care discussed with patient and son at bedside . Discussed management/test result(s) with Rn    Quality:  DVT Prophylaxis: Heparin  CODE status: Full  Estimated date of discharge: TBD  Discharge is dependent on: clinical stability    52 minutes spent discussing with other providers, examining patient, obtaining history, reviewing medical records, interpreting and communicating test results/imaging, ordering tests/medications, discussing plan of care and documenting information.      Chay Adams MD          This note was  prepared using Dragon Medical voice recognition dictation software. As a result errors may occur. When identified these errors have been corrected. While every attempt is made to correct errors during dictation discrepancies may still exist

## 2024-08-13 NOTE — PROGRESS NOTES
Thoracic Surgery Progress Note     Annmarie Levin is a 78 year old female. MRN [id number]. Admitted 2024    SUBJECTIVE/24H EVENTS:     No acute events overnight. Reports some shortness of breath. Pain controlled. Ambulated with PT. Tolerating a diet.     Objective:     VITALS:     Temp (24hrs), Av °F (36.7 °C), Min:97.6 °F (36.4 °C), Max:98.8 °F (37.1 °C)   /57 (BP Location: Left arm)   Pulse 81   Temp 97.6 °F (36.4 °C) (Temporal)   Resp 19   Ht 157.5 cm (5' 2\")   Wt 184 lb 11.9 oz (83.8 kg)   LMP 10/18/1991 (Approximate)   SpO2 94%   BMI 33.79 kg/m²     EXAM:   General: well appearing female in no acute distress  Heart: regular rate and rhythm.   Lungs: Normal respiratory effort. Equal chest rise bilaterally  Incisions: c/d/i   Chest tube:   Air Leak: no  Output: serosanguineous   24 hour: 340 cc  Suction: yes  Abdomen: Soft, Non-tender, non-distended.  Extremities: No clubbing or cyanosis. No lateralizing weakness  Neuro: no focal defects  Psych:  oriented to person place and time, normal mood and affect      Intake/Output Summary (Last 24 hours) at 2024 1156  Last data filed at 2024 0800  Gross per 24 hour   Intake 2200 ml   Output 1040 ml   Net 1160 ml         MEDS:   acetaminophen  1,000 mg Intravenous Q6H    heparin  5,000 Units Subcutaneous 2 times per day    metoprolol tartrate  12.5 mg Oral 2x Daily(Beta Blocker)    montelukast  10 mg Oral Nightly    pantoprazole  40 mg Oral QAM AC    fluticasone-salmeterol  1 puff Inhalation BID       LABS:  Lab Results   Component Value Date    WBC 8.9 2024    HGB 11.1 2024    HCT 32.4 2024    .0 2024    CREATSERUM 0.74 2024    BUN 12 2024     2024    K 4.4 2024     2024    CO2 32.0 2024     2024    CA 8.8 2024         Assessment/Plan:     78 year old female 1 day post op from right VATS bleb resection and talc pleurodesis.     -Chest tube  to suction, okay to ambulate off of suction.   -D/c fluids    -General diet.   -Pain control. No NSAIDS.   -Maintain saturations above 90%. Wean O2 as tolerated.  -Ambulate 3-4 times per day in the halls. Spend majority of day out of bed, in chair. Encouraged cough and IS use 10x hourly.       Patient discussed with Dr. Orona.     BRENNAN CasillasC  Thoracic Surgery  Pager: [phone]

## 2024-08-13 NOTE — OCCUPATIONAL THERAPY NOTE
OCCUPATIONAL THERAPY RE-EVALUATION NOTE - INPATIENT        Room Number: 229/229-A  Number of Visits to Meet Established Goals: 3  Presenting Problem: pneumothorax    Problem List  Principal Problem:    Pneumothorax, unspecified type      OCCUPATIONAL THERAPY ASSESSMENT   Re-evaluation completed -- pt 1 day post op from right VATS bleb resection and talc pleurodesis.    Patient demonstrates good  progress this session, goals remain in progress.    Patient is requiring CGA/ SBA as a result of the following impairments: limited reach, pain, fatigue.    Patient continues to function below baseline with ADLs and functional mobility.  Next session anticipate patient to progress LE dressing, grooming in standing at the sink, energy conservation and pacing.  Occupational Therapy will continue to follow patient for duration of hospitalization.    Patient continues to benefit from continued skilled OT services: at discharge to promote prior level of function and safety with additional support and return home with home health OT.     PLAN  OT Treatment Plan: Balance activities;Energy conservation/work simplification techniques;IADL training;ADL training;Functional transfer training;Patient/Family education;Patient/Family training  OT Device Recommendations: TBD    SUBJECTIVE  \"I like to stay active\"     OBJECTIVE  Precautions: Limb alert - right;Chest tube to suction    WEIGHT BEARING RESTRICTION     PAIN ASSESSMENT  Ratin  Location: R drain site  Management Techniques: Activity promotion; Breathing techniques; Body mechanics; Repositioning    ACTIVITY TOLERANCE    O2 SATURATIONS  Oxygen Therapy  SPO2% on Oxygen at Rest: 97  At rest oxygen flow (liters per minute): 1  SPO2% Ambulation on Oxygen: 88 (2 min to recover x >90%)  Ambulation oxygen flow (liters per minute): 1    ACTIVITIES OF DAILY LIVING ASSESSMENT  AM-PAC ‘6-Clicks’ Inpatient Daily Activity Short Form  How much help from another person does the patient  currently need…  -   Putting on and taking off regular lower body clothing?: A Little  -   Bathing (including washing, rinsing, drying)?: A Little  -   Toileting, which includes using toilet, bedpan or urinal? : A Little  -   Putting on and taking off regular upper body clothing?: None  -   Taking care of personal grooming such as brushing teeth?: None  -   Eating meals?: None    AM-PAC Score:  Score: 21  Approx Degree of Impairment: 32.79%  Standardized Score (AM-PAC Scale): 44.27  CMS Modifier (G-Code): CJ    FUNCTIONAL TRANSFER ASSESSMENT  Sit to Stand: Edge of Bed; Chair  Edge of Bed: Not Tested  Chair: Stand-by Assist  Toilet Transfer: Not Tested    BED MOBILITY  Supine to Sit : Not tested    BALANCE ASSESSMENT  Static Sitting: Stand-by Assist  Sitting Bilateral: Stand-by Assist  Static Standing: Stand-by Assist  Standing Bilateral: Stand-by Assist    FUNCTIONAL ADL ASSESSMENT  Eating: Independent  Grooming Seated: Not Tested  Grooming Standing: Stand-by Assist  Bathing Seated: Not Tested  UB Dressing Seated: Stand-by Assist  LB Dressing Seated: Minimal Assist  Toileting Seated: Not Tested    Skilled Therapy Provided: RN cleared pt for participation in occupational therapy session, which was completed in collaboration with physical therapy. Upon arrival, pt was seated in bedside chair and agreeable to activity. Pt's son present during session. Pt benefited from additional time, verbal cues, RW for energy conservation to maximize participation.     Pt completed activity w/ overall SBA / CGA with no loss of balance. Pt protective of chest tube, managing line per preference during transitional movements. Pt reported activity as mildly challenging, overall \"ok\". Pt with slight de-saturation on supplemental O2 , recovering to x>90% within 2 min on 1L/min. LE dressing participation limited by lines/ tubes.     EDUCATION PROVIDED  Patient: Role of Occupational Therapy; Plan of Care; Discharge Recommendations; DME  Recommendations; Functional Transfer Techniques; Fall Prevention; Energy Conservation; Compensatory ADL Techniques; Proper Body Mechanics  Patient's Response to Education: Verbalized Understanding; Returned Demonstration    The patient's Approx Degree of Impairment: 32.79% has been calculated based on documentation in the [facility] '6 clicks' Inpatient Daily Activity Short Form.  Research supports that patients with this level of impairment may benefit from home.  Final disposition will be made by interdisciplinary medical team.    Patient End of Session: Up in chair;Needs met;Call light within reach;RN aware of session/findings;Family present    OT Goals:  Patients self stated goal is: go home      Patient will complete functional transfer with MOD I   Comment:     Patient will complete toileting with MOD I   Comment:     Patient will tolerate standing for 3 minutes in prep for adls with MOD I    Comment:    Patient will complete item retrieval with MOD I  Comment:             Goals  on:  24  Frequency: 3-5x/week    OT Session Time: 30 minutes  Self-Care Home Management: 15 minutes  Therapeutic Activity: 15 minutes

## 2024-08-13 NOTE — PROGRESS NOTES
[facility] Patient Status:  Inpatient    1945 MRN [id number]   Location [facility] 2W/SW Attending Chay Adams MD   Hosp Day # 8 PCP LLOYD ANDREWS MD        SUBJECTIVE:S/p VATS with ICU monitoring overnight, notes pain currently manageable at level 2, no new complaints   OBJECTIVE:  Patient Vitals for the past 24 hrs:   BP Temp Temp src Pulse Resp SpO2 Weight   24 0630 -- -- -- -- -- -- 184 lb 11.9 oz (83.8 kg)   24 0600 137/55 -- -- 66 15 98 % --   24 0500 -- -- -- -- -- 92 % --   24 0400 106/51 97.6 °F (36.4 °C) Temporal 55 18 100 % --   24 0200 117/58 -- -- 60 16 98 % --   24 0000 112/66 97.9 °F (36.6 °C) Temporal 79 16 99 % --   24 2300 130/56 -- -- 71 13 99 % --   24 2200 126/56 -- -- 61 10 98 % --   24 2100 125/61 -- -- 55 15 98 % --   24 2000 116/62 97.6 °F (36.4 °C) Temporal 59 14 99 % --   24 1900 135/54 -- -- 69 20 96 % --   24 1800 118/74 -- -- 68 20 100 % --   24 1700 133/65 -- -- 68 (!) 8 100 % --   24 1630 -- -- -- 64 (!) 7 96 % --   24 1615 -- -- -- 62 13 94 % --   24 1600 119/89 98.8 °F (37.1 °C) Temporal 60 13 94 % --   24 1545 -- -- -- 62 22 95 % --   24 1500 133/57 98.1 °F (36.7 °C) -- 56 12 97 % --   24 1450 136/67 -- -- 54 20 97 % --   24 1440 141/56 -- -- 58 13 96 % --   24 1430 130/61 -- -- 56 14 98 % --   24 1425 -- -- -- -- -- 100 % --   24 1420 126/72 -- -- 69 17 100 % --   24 1415 -- -- -- -- -- 100 % --   24 1410 126/85 -- -- 62 12 100 % --   24 1405 -- -- -- -- -- 100 % --   24 1400 119/76 -- -- 68 13 100 % --   24 1353 -- -- -- -- -- 100 % --   24 1350 131/66 -- -- 58 18 100 % --   24 1347 -- -- -- -- -- 100 % --   24 1342 -- -- -- -- -- 100 % --   24 1340 142/64 -- -- 68 11 100 % --   24 1337 -- -- -- -- -- 100 % --   24 1330 126/67 --  -- 74 15 100 % --   08/12/24 1104 135/57 97.7 °F (36.5 °C) Oral 98 16 95 % --   08/12/24 0954 138/68 97.8 °F (36.6 °C) Oral 66 20 92 % --     O2 requirement: 2L     I/O last 3 completed shifts:  In: 2071 [P.O.:1211; I.V.:850; IV PIGGYBACK:10]  Out: 208 [Urine:1; Chest Tube:207]  I/O this shift:  In: 1340 [P.O.:360; I.V.:860; IV PIGGYBACK:120]  Out: 290 [Urine:100; Chest Tube:190]    Medications:  Reviewed in EMR.     Physical Exam:   General: no distress   Throat: No thrush noted.   Lungs: clear bilaterally   Heart: Regular rate and rhythm, normal S1S2, no murmur.   Abdomen: soft, non-tender, non-distended, positive BS.   Extremity: no edema, no cyanosis   Skin: No rashes or lesions.       Lab Results   Component Value Date    WBC 8.9 08/13/2024    HGB 11.1 08/13/2024    HCT 32.4 08/13/2024    .0 08/13/2024    CREATSERUM 0.74 08/13/2024    BUN 12 08/13/2024     08/13/2024    K 4.4 08/13/2024     08/13/2024    CO2 32.0 08/13/2024     08/13/2024    CA 8.8 08/13/2024          Imaging: Reviewed.     ASSESSMENT/PLAN:     Non resolving pneumothorax:  s/p VATS bleb resection and talc pleurodesis 8/12   -per thoracic surgery  Acute/Chronic respiratory failure:  secondary to above, severe COPD  - wean O2 as tolerated  COPD - exacerbation - resolved   - s/p prednisone  - continue home inhalers  NADJA  - CPAP deferred due to PTX, resume when OK with surgery  Mediastinal mass - likely benign  - outpatient follow-up  Advanced directives -  full   Dispo - stable   - will follow  - ICU  Discussed with patient in detail, all questions answered.     Rancho Piña MD  8/13/2024  6:46 AM    Critical care time:  35 min

## 2024-08-13 NOTE — PHYSICAL THERAPY NOTE
PHYSICAL THERAPY EVALUATION - INPATIENT     Room Number: 229/229-A  Evaluation Date: 8/13/2024  Type of Evaluation: Re-evaluation   Physician Order: PT Eval and Treat    Presenting Problem: pneumothorax chest tube in place  Co-Morbidities : COPD on 2L O2, OA, breast cancer S/P R mastectomy  Reason for Therapy: Mobility Dysfunction and Discharge Planning    PHYSICAL THERAPY ASSESSMENT   Patient is a 78 year old female admitted 8/4/2024 for s/p VATS.  Prior to admission, patient's baseline is independent in ADL's and ambulation with no assistive device.  Patient is currently functioning near baseline with transfers and gait.  Patient is requiring contact guard assist as a result of the following impairments: pain, medical status, and increased O2 needs from baseline.  Physical Therapy will continue to follow for duration of hospitalization.    Patient will benefit from continued skilled PT Services at discharge to promote prior level of function and safety with additional support and return home with home health PT.    PLAN  PT Treatment Plan: Bed mobility;Body mechanics;Patient education;Gait training;Balance training;Transfer training  Rehab Potential : Good  Frequency (Obs): 5x/week    PHYSICAL THERAPY MEDICAL/SOCIAL HISTORY   Problem List  Principal Problem:    Pneumothorax, unspecified type    HOME SITUATION  Home Situation  Type of Home: Condo  Home Layout: One level  Lives With: Alone  Drives: Yes  Patient Owned Equipment: Rolling walker;Cane  Patient Regularly Uses: Home O2 (2L PRN)     SUBJECTIVE  \"I am very possessive of my hose\"    PHYSICAL THERAPY EXAMINATION   OBJECTIVE  Precautions: Limb alert - right;Chest tube to suction  Fall Risk: Standard fall risk    WEIGHT BEARING RESTRICTION  Weight Bearing Restriction: None                PAIN ASSESSMENT  Rating: Unable to rate  Location: chest tube site  Management Techniques: Activity promotion;Body mechanics;Repositioning    COGNITION  Overall Cognitive  Status:  WFL - within functional limits    RANGE OF MOTION AND STRENGTH ASSESSMENT  Lower extremity ROM is within functional limits  BLE WNL  Lower extremity strength is within functional limits  BLE WNL    BALANCE  Static Sitting: Good  Dynamic Sitting: Fair +  Static Standing: Fair  Dynamic Standing: Fair -    AM-PAC '6-Clicks' INPATIENT SHORT FORM - BASIC MOBILITY  How much difficulty does the patient currently have...  Patient Difficulty: Turning over in bed (including adjusting bedclothes, sheets and blankets)?: None   Patient Difficulty: Sitting down on and standing up from a chair with arms (e.g., wheelchair, bedside commode, etc.): A Little   Patient Difficulty: Moving from lying on back to sitting on the side of the bed?: A Little   How much help from another person does the patient currently need...   Help from Another: Moving to and from a bed to a chair (including a wheelchair)?: A Little   Help from Another: Need to walk in hospital room?: A Little   Help from Another: Climbing 3-5 steps with a railing?: A Little     AM-PAC Score:  Raw Score: 19   Approx Degree of Impairment: 41.77%   Standardized Score (AM-PAC Scale): 45.44   CMS Modifier (G-Code): CK    FUNCTIONAL ABILITY STATUS  Functional Mobility/Gait Assessment  Gait Assistance: Supervision  Distance (ft): 150ft  Assistive Device: Rolling walker  Pattern: Within Functional Limits  Rolling:  not tested   Supine to Sit:  not tested  Sit to Supine:  not tested   Sit to Stand: supervision    Exercise/Education Provided:  Body mechanics  Gait training  Transfer training    Skilled Therapy Provided: Patient on 2 liters of oxygen during the session, oxygen sat 95% and heart rate 84, blood pressure 127/57. Patient currently with CGA to SBA for mobility during the session with rolling walker. Patient able to ambulate about 150ft with rolling walker. Patient reports slight shortness of breath. The patient's Approx Degree of Impairment: 41.77% has been  calculated based on documentation in the [facility] '6 clicks' Inpatient Basic Mobility Short Form.  Research supports that patients with this level of impairment may benefit from home with home health. Patient received seated in bedside chair, agreeable to physical therapy evaluation. Education with patient provided verbally on physical therapy plan of care and physiological benefits of out of bed mobility. Next session anticipate to progress bed mobility, transfers, and gait.    Patient history and/or personal factors that may impact the plan of care include home accessibility concerns. Based on the physical therapy examination of the noted systems and functional activity/participation limitations, the patient presentation is stable given the patient presents with surgical precautions/limitations, demonstrates worsening of previously stable condition, and demonstrates worsening of co-morbidities. Final disposition will be made by interdisciplinary medical team.    Patient End of Session: Up in chair;Needs met;Call light within reach;RN aware of session/findings;All patient questions and concerns addressed;Family present    CURRENT GOALS  Goals to be met by: 8/25/24  Patient Goal Patient's self-stated goal is: to go home   Goal #1 Patient is able to demonstrate supine - sit EOB @ level: supervision     Goal #1   Current Status    Goal #2 Patient is able to demonstrate transfers Sit to/from Stand at assistance level: supervision with walker - rolling     Goal #2  Current Status    Goal #3 Patient is able to ambulate 100 feet with assist device: walker - rolling at assistance level: supervision   Goal #3   Current Status    Goal #4    Goal #4   Current Status    Goal #5 Patient to demonstrate independence with home activity/exercise instructions provided to patient in preparation for discharge.   Goal #5   Current Status    Goal #6    Goal #6  Current Status      Gait Training: 10 minutes  Therapeutic Activity:  13  minutes

## 2024-08-13 NOTE — OPERATIVE REPORT
[facility]    PATIENT'S NAME: MICHAEL PRUITT   ATTENDING PHYSICIAN: Chay Adams MD   OPERATING PHYSICIAN: Brian Orona Jr., MD   PATIENT ACCOUNT#:   [id number]    LOCATION:  [address] [place] [address]  MEDICAL RECORD #:   [id number]       YOB: 1945  ADMISSION DATE:       08/04/2024      OPERATION DATE:  08/12/2024    OPERATIVE REPORT      PREOPERATIVE DIAGNOSIS:  Spontaneous pneumothorax with ongoing air leak.  POSTOPERATIVE DIAGNOSIS:    1.   Spontaneous pneumothorax with ongoing air leak.  2.   Pleural nodularity.  PROCEDURE:    1.   Flexible bronchoscopy.  2.   Right video-assisted thoracoscopic exploration.  3.   Pleural biopsy x2.  4.   Right upper lobe wedge resection.  5.   Talc pleurodesis.  6.   Multilevel intercostal nerve block.    ASSISTANT:  Sherrie Carter PA-C.    ANESTHESIA:  General dual-lumen endotracheal anesthesia.    ANESTHESIOLOGIST:  Rancho Quintana MD.    SPECIMENS:  Right upper lobe wedge, right pleural biopsy #1 and 2.    DRAINS:  A 24-[place] chest tube x1.    IMPLANTS:  None.    ESTIMATED BLOOD LOSS:  5 mL.    COMPLICATIONS:  None.    CONDITION:  Stable, to PACU.    INDICATIONS:  The patient is a 78-year-old woman with history of COPD who presented with acute shortness of breath.  She found to have a pneumothorax.  Chest tube was placed.  She had an ongoing air leak for a number of days.  Her chest tube had migrated out and was replaced and that replacement also had gotten kinked requiring another replacement, and the patient had had rapid response called as a result of this.  Also during this time, her air leak remained robust.  Due to the prolonged and vigorous nature of her air leak and associated in-hospital complications, the patient agreed to a surgical exploration with resection of the bleb as well as a pleurodesis.  I had a long conversation with the patient of the risks and benefits.  After thoughtful conversation and discussion with family, she  agreed to move forward.    FINDINGS:  There was a large hole seen at the apex of the right upper lobe, which was actively seen to be leaking air.  This was resected.  No other sites of air leak were noted.  There was some pleural nodularity that was biopsied and sent for pathology.    OPERATIVE TECHNIQUE:  Patient was brought to the operating room, laid supine, underwent general dual-lumen endotracheal anesthesia.  I performed a flexible bronchoscopy.  The trachea, mainstem bronchi, lobar, and segmental bronchi were examined bilaterally.  This was a normal bronchoscopy.  No endobronchial lesions were seen.  She was then placed in the left lateral decubitus position.  All pressure points were padded.  I asked the anesthesiologist to isolate the right lung and then the previous right chest tube was removed.  She was then prepped and draped in a sterile fashion.  Time-out was performed to confirm patient, side and site of surgery.  I made a 2 cm incision in the inframammary crease anterior axillary line and entered the chest bluntly.  A camera through this incision revealed I was safely in the chest space.  I then made another 8 mm incision in the posterior axillary line approximately seventh intercostal space and entered the chest under direct visualization.  The camera was moved to this site.  With these incisions, I was able to manipulate the lung.  I could see there were a couple of adhesions up to the top of the apex of the right upper lobe.  Immediately adjacent to this was a what appeared to be a ruptured bleb.  There was some pleural fluid within the chest space.  This area was dunked under the level of the fluid, and I could see air actively leaking of the lung at this site.  Satisfied that this was the location of the air leak, I used an Ethicon 45 mm [place] stapler with gold loads and staple line reinforcers to do a wedge resection of this area.  This was done after releasing the adhesions with hook  electrocautery.  This piece was sent as a right upper lobe wedge.  I did a multilevel intercostal nerve block.  I use 60 mL of 0.25% Marcaine.  Then, 5 mL were injected in each interspace 2 through 9 under direct visualization with thoracoscope for postoperative pain control.  In doing the nerve block, I noticed there was some pleural nodularity.  I elected to do a pleural biopsy of a couple representative areas.  These were sent as pleural biopsy #1 and 2, and sent for routine pathology.  Any remaining fluid was suctioned out and then I performed a talc pleurodesis.  I used 4 g of sterile talc and insufflated this throughout the right chest in a poudrage fashion.  Once I was satisfied that the entirety of the right chest had a good covering of talc, I placed a 24-Maltese chest tube posteriorly and secured using 0 silk sutures.  I then asked the anesthesiologist to reinflate the lung.  It reinflated well and filled the chest space.  I then withdrew my camera and all instruments and covered the incisions while hooking the chest tube up to the atrium and suction.  In doing this, I could see that there was no ongoing air leak.  The anesthesiologist confirmed no air leak coming from the ventilator either.  I then closed remaining incisions in 3 layers of 2 layers with 2-0 Vicryl and 1 of 4-0 Monocryl.  Patient tolerated the procedure well.  I was present for the entirety of the procedure.    Dictated By Brian Orona Jr., MD  d: 08/12/2024 13:14:53  t: 08/12/2024 22:16:11  Job [id number]  JLL/    cc: Chay Adams MD

## 2024-08-14 ENCOUNTER — APPOINTMENT (OUTPATIENT)
Dept: CV DIAGNOSTICS | Facility: HOSPITAL | Age: 79
End: 2024-08-14
Attending: INTERNAL MEDICINE
Payer: MEDICARE

## 2024-08-14 ENCOUNTER — APPOINTMENT (OUTPATIENT)
Dept: GENERAL RADIOLOGY | Facility: HOSPITAL | Age: 79
End: 2024-08-14
Attending: STUDENT IN AN ORGANIZED HEALTH CARE EDUCATION/TRAINING PROGRAM
Payer: MEDICARE

## 2024-08-14 LAB
ANION GAP SERPL CALC-SCNC: 2 MMOL/L (ref 0–18)
BASOPHILS # BLD AUTO: 0.03 X10(3) UL (ref 0–0.2)
BASOPHILS NFR BLD AUTO: 0.3 %
BUN BLD-MCNC: 14 MG/DL (ref 9–23)
BUN/CREAT SERPL: 15.2 (ref 10–20)
CALCIUM BLD-MCNC: 9.1 MG/DL (ref 8.7–10.4)
CHLORIDE SERPL-SCNC: 102 MMOL/L (ref 98–112)
CO2 SERPL-SCNC: 34 MMOL/L (ref 21–32)
CREAT BLD-MCNC: 0.92 MG/DL
DEPRECATED RDW RBC AUTO: 59.5 FL (ref 35.1–46.3)
EGFRCR SERPLBLD CKD-EPI 2021: 64 ML/MIN/1.73M2 (ref 60–?)
EOSINOPHIL # BLD AUTO: 0.22 X10(3) UL (ref 0–0.7)
EOSINOPHIL NFR BLD AUTO: 2.2 %
ERYTHROCYTE [DISTWIDTH] IN BLOOD BY AUTOMATED COUNT: 15.9 % (ref 11–15)
GLUCOSE BLD-MCNC: 112 MG/DL (ref 70–99)
HCT VFR BLD AUTO: 36.6 %
HGB BLD-MCNC: 11.9 G/DL
IMM GRANULOCYTES # BLD AUTO: 0.05 X10(3) UL (ref 0–1)
IMM GRANULOCYTES NFR BLD: 0.5 %
LYMPHOCYTES # BLD AUTO: 1.16 X10(3) UL (ref 1–4)
LYMPHOCYTES NFR BLD AUTO: 11.8 %
MCH RBC QN AUTO: 33.7 PG (ref 26–34)
MCHC RBC AUTO-ENTMCNC: 32.5 G/DL (ref 31–37)
MCV RBC AUTO: 103.7 FL
MONOCYTES # BLD AUTO: 1.33 X10(3) UL (ref 0.1–1)
MONOCYTES NFR BLD AUTO: 13.6 %
NEUTROPHILS # BLD AUTO: 7.01 X10 (3) UL (ref 1.5–7.7)
NEUTROPHILS # BLD AUTO: 7.01 X10(3) UL (ref 1.5–7.7)
NEUTROPHILS NFR BLD AUTO: 71.6 %
OSMOLALITY SERPL CALC.SUM OF ELEC: 287 MOSM/KG (ref 275–295)
PLATELET # BLD AUTO: 263 10(3)UL (ref 150–450)
POTASSIUM SERPL-SCNC: 5 MMOL/L (ref 3.5–5.1)
Q-T INTERVAL: 290 MS
QRS DURATION: 90 MS
QTC CALCULATION (BEZET): 458 MS
R AXIS: -51 DEGREES
RBC # BLD AUTO: 3.53 X10(6)UL
SODIUM SERPL-SCNC: 138 MMOL/L (ref 136–145)
T AXIS: 110 DEGREES
TROPONIN I SERPL HS-MCNC: 31 NG/L
TROPONIN I SERPL HS-MCNC: 9 NG/L
TSI SER-ACNC: 2.8 MIU/ML (ref 0.55–4.78)
VENTRICULAR RATE: 150 BPM
WBC # BLD AUTO: 9.8 X10(3) UL (ref 4–11)

## 2024-08-14 PROCEDURE — 93306 TTE W/DOPPLER COMPLETE: CPT | Performed by: INTERNAL MEDICINE

## 2024-08-14 PROCEDURE — 99233 SBSQ HOSP IP/OBS HIGH 50: CPT | Performed by: INTERNAL MEDICINE

## 2024-08-14 PROCEDURE — 71045 X-RAY EXAM CHEST 1 VIEW: CPT | Performed by: STUDENT IN AN ORGANIZED HEALTH CARE EDUCATION/TRAINING PROGRAM

## 2024-08-14 RX ORDER — DILTIAZEM HYDROCHLORIDE 5 MG/ML
INJECTION INTRAVENOUS
Status: COMPLETED
Start: 2024-08-14 | End: 2024-08-14

## 2024-08-14 RX ORDER — DILTIAZEM HYDROCHLORIDE 5 MG/ML
10 INJECTION INTRAVENOUS ONCE
Status: COMPLETED | OUTPATIENT
Start: 2024-08-14 | End: 2024-08-14

## 2024-08-14 NOTE — PROGRESS NOTES
Thoracic Surgery Progress Note     Annmarie Levin is a 78 year old female. MRN [id number]. Admitted 2024    SUBJECTIVE/24H EVENTS:     Doing well.  Coughed up a small amount of blood.    Objective:     VITALS:     Temp (24hrs), Av °F (36.7 °C), Min:97.5 °F (36.4 °C), Max:98.6 °F (37 °C)   /47 (BP Location: Left arm)   Pulse 71   Temp 98.1 °F (36.7 °C) (Temporal)   Resp 18   Ht 1.575 m (5' 2\")   Wt 83.8 kg (184 lb 11.9 oz)   LMP 10/18/1991 (Approximate)   SpO2 94%   BMI 33.79 kg/m²     EXAM:   General: well appearing female in no acute distress  Heart: regular rate and rhythm.   Lungs: Normal respiratory effort. Equal chest rise bilaterally  Incisions: c/d/i   Chest tube:   Air Leak: no  Output: serosanguineous   24 hour: 210 cc  Suction: yes  Abdomen: Soft, Non-tender, non-distended.  Extremities: No clubbing or cyanosis. No lateralizing weakness  Neuro: no focal defects  Psych:  oriented to person place and time, normal mood and affect      Intake/Output Summary (Last 24 hours) at 2024 1733  Last data filed at 2024 1200  Gross per 24 hour   Intake 340 ml   Output 1150 ml   Net -810 ml         MEDS:   metoprolol tartrate  25 mg Oral 2x Daily(Beta Blocker)    heparin  5,000 Units Subcutaneous 2 times per day    montelukast  10 mg Oral Nightly    pantoprazole  40 mg Oral QAM AC    fluticasone-salmeterol  1 puff Inhalation BID       LABS:  Lab Results   Component Value Date    WBC 9.8 2024    HGB 11.9 2024    HCT 36.6 2024    .0 2024    CREATSERUM 0.92 2024    BUN 14 2024     2024    K 5.0 2024     2024    CO2 34.0 2024     2024    CA 9.1 2024    TSH 2.796 2024    TROPHS 31 2024         Assessment/Plan:     78 year old female 2 day post op from right VATS bleb resection and talc pleurodesis.     -Chest tube removed   -General diet.   -Pain control. No NSAIDS.   -Maintain  saturations above 90%. Wean O2 as tolerated.  -Ambulate 3-4 times per day in the halls. Spend majority of day out of bed, in chair. Encouraged cough and IS use 10x hourly.     -ok for pulm care unit,  -Discharge per medical service.  discharge instructions reviewed.

## 2024-08-14 NOTE — PROGRESS NOTES
[facility] [place] Patient Status:  Inpatient    1945 MRN [id number]   Location [facility] 2W/SW Attending Chay Adams MD   Hosp Day # 9 PCP LLOYD ANDREWS MD     Critical Care Progress Note     S:  Afib with RVR overnight.  Notes discomfort at surgery site with coughing unchanged.        OBJECTIVE:  Patient Vitals for the past 24 hrs:   BP Temp Temp src Pulse Resp SpO2   24 0400 143/82 -- -- (!) 138 22 95 %   24 0200 127/73 -- -- (!) 134 24 94 %   24 0000 115/57 -- -- 85 12 98 %   24 2200 128/58 -- -- 95 19 94 %   24 2000 129/57 97.9 °F (36.6 °C) Axillary 79 21 94 %   24 1800 120/49 -- -- 80 18 92 %   24 1630 116/47 -- -- 76 16 95 %   24 1400 103/76 97.6 °F (36.4 °C) Temporal 101 (!) 28 90 %   24 1200 125/62 -- -- 93 17 94 %   24 1100 127/57 -- -- 81 19 94 %   24 0800 109/51 97.9 °F (36.6 °C) Temporal 69 20 96 %        O2/Ventilator Settings: 2L      Wt Readings from Last 3 Encounters:   24 184 lb 11.9 oz (83.8 kg)   24 186 lb (84.4 kg)   24 191 lb (86.6 kg)        I/O last 3 completed shifts:  In: 1730 [P.O.:460; I.V.:1150; IV PIGGYBACK:120]  Out:  [Urine:1650; Chest Tube:400]  No intake/output data recorded.     Physical Exam:   General: follows commands   Lungs: clear bilaterally   Heart: IRRegular rate and rhythm, normal S1S2, no murmur.   Abdomen: soft, non-tender, non-distended, positive BS.   Extremity: no edema   Skin: No rashes or lesions.       Lab Results   Component Value Date    WBC 9.8 2024    RBC 3.53 2024    HGB 11.9 2024    HCT 36.6 2024    .7 2024    MCH 33.7 2024    MCHC 32.5 2024    RDW 15.9 2024    .0 2024     Lab Results   Component Value Date     2024    K 5.0 2024     2024    CO2 34.0 2024    BUN 14 2024    CREATSERUM 0.92 2024     2024     CA 9.1 08/14/2024             Imaging: Reviewed     ASSESSMENT/PLAN:     Non resolving pneumothorax:  s/p VATS bleb resection and talc pleurodesis 8/12   -per thoracic surgery  Acute/Chronic respiratory failure:  secondary to above, severe COPD  - wean O2 as tolerated  COPD - exacerbation - resolved   - s/p prednisone  - continue home inhalers  Afib with RVR  -cont cardizem  -per hospitalist  NADJA  - CPAP deferred due to PTX, resume when OK with surgery  Mediastinal mass - likely benign  - outpatient follow-up  Advanced directives -  full   Dispo - stable   - will follow  - ICU  Critical Care Time greater than: 35 minutes    Rancho Piña MD  8/14/2024  7:01 AM

## 2024-08-14 NOTE — PROGRESS NOTES
[facility]     Annmarie Levin Patient Status:  Inpatient    1945 MRN [id number]   Location [facility]5W Attending Chay Adams MD   Hosp Day # 9 PCP LLOYD ANDREWS MD     Chief Complaint:   Chief Complaint   Patient presents with    Shortness Of Breath    Chest Pain Angina        Subjective:     Patient seen sitting in chair.  Son at bedside.  Patient with episode of atrial fibrillation overnight.  Patient states she had 1 episode in past around 20 to 30 years ago.  At that time she was seen by cardiology and placed on the monitor.  She has been without episodes since that time.  Patient has not been on medication or anticoagulation.  This a.m., patient converted to sinus rhythm.  Patient denied associated shortness of breath, chest pain.      Objective:      Vital signs:  Vitals:    24 0400 24 0700 24 0800 24 1000   BP: 143/82 119/56 132/72 103/56   BP Location: Left arm  Left arm    Pulse: (!) 138 99 104 69   Resp:    Temp:   98.6 °F (37 °C)    TempSrc:   Temporal    SpO2: 95% 97% 90% 93%   Weight:       Height:           Intake/Output Summary (Last 24 hours) at 2024 1021  Last data filed at 2024 1005  Gross per 24 hour   Intake 630 ml   Output 1160 ml   Net -530 ml           Physical Exam:    GENERAL:  Awake and alert, in no acute distress.  CHEST: Right-sided chest tube in place.  Chest wall/chest crepitus improving  HEART:  Regular rhythm, regular rate  LUNGS:  Air entry was decreased, worse over the right.  No increased work of breathing or wheezes   ABDOMEN: Soft and non-tender.    PSYCHIATRIC: Normal mood    Diagnostic Data:    Labs:    Recent Labs   Lab 24  0457 24  0450 24  0333   WBC 6.0 8.9 9.8   HGB 11.4* 11.1* 11.9*   .5* 101.9* 103.7*   .0 238.0 263.0       Recent Labs   Lab 24  0457 24  0451 24  0333   *  106* 112*   BUN 13 12 14   CREATSERUM 0.67 0.74 0.92   CA 9.5 8.8 9.1    137 138   K 4.1 4.4 5.0    103 102   CO2 34.0* 32.0 34.0*           Estimated Creatinine Clearance: 40.5 mL/min (based on SCr of 0.92 mg/dL).    No results for input(s): \"PTP\", \"INR\" in the last 168 hours.    Lab Results   Component Value Date    TSH 2.796 08/14/2024       COVID-19  Lab Results   Component Value Date    COVID19 Not Detected 10/14/2022       Pro-Calcitonin  No results for input(s): \"PCT\" in the last 168 hours.    Cardiac  No results for input(s): \"TROP\", \"PBNP\" in the last 168 hours.    Inflammatory Markers  No results for input(s): \"CRP\", \"SONIA\", \"LDH\", \"DDIMER\" in the last 168 hours.      Culture:  No results found for this visit on 08/04/24.    XR CHEST AP PORTABLE  (CPT=71045)    Result Date: 8/14/2024  CONCLUSION:  1. Cardiomegaly tortuous thoracic aorta. 2. Chronic appearing pulmonary interstitial fibrosis.  Chronic right apical pleural parenchymal abnormality with traction bronchiectasis.  Persistent right apical pneumothorax measuring 10-15% with slight progression measures 27 mm.  Partial atelectatic changes in the right apex 3. Chronic bronchopleural fistula cannot be excluded.    Dictated by (CST): Earl Barber MD on 8/14/2024 at 8:16 AM     Finalized by (CST): Earl Barber MD on 8/14/2024 at 8:20 AM          XR CHEST AP PORTABLE  (CPT=71045)    Result Date: 8/13/2024  CONCLUSION:  1. Cardiomegaly.  Tortuous thoracic aorta. 2. Emphysematous changes with chronic pulmonary interstitium. 3. Persistent small right apical pneumothorax measuring 10-15% similar prior exam.  Pigtail catheter drain has been replaced with the chest tube in the upper right hemithorax.  Chronic right apical pleural parenchymal scarring with chronic right apical traction bronchiectasis.  Chronic bronchopleural fistula cannot be excluded.    Dictated by (CST): Earl Barber MD on 8/13/2024 at 12:16 PM     Finalized by  (CST): Earl Barber MD on 8/13/2024 at 12:23 PM          XR CHEST AP PORTABLE  (CPT=71045)    Result Date: 8/12/2024  CONCLUSION:   The right apical pneumothorax is slightly decreased in size compared to the prior exam.  No new abnormality.    Dictated by (CST): Wagner Matthews MD on 8/12/2024 at 7:49 AM     Finalized by (CST): Wagner Matthews MD on 8/12/2024 at 7:52 AM           EKG 12 Lead    Result Date: 8/14/2024  Atrial fibrillation with rapid ventricular response RSR' or QR pattern in V1 suggests right ventricular conduction delay Left anterior fascicular block Minimal voltage criteria for LVH, may be normal variant ( Romel product ) Marked ST abnormality, possible inferior subendocardial injury Abnormal ECG When compared with ECG of 10-AUG-2024 22:40, Atrial fibrillation has replaced Sinus rhythm Minimal criteria for Septal infarct are no longer Present ST now depressed in Inferior leads ST now depressed in Anterior-lateral leads     Medications:    heparin  5,000 Units Subcutaneous 2 times per day    metoprolol tartrate  12.5 mg Oral 2x Daily(Beta Blocker)    montelukast  10 mg Oral Nightly    pantoprazole  40 mg Oral QAM AC    fluticasone-salmeterol  1 puff Inhalation BID       Assessment & Plan:      Spontaneous pneumothorax  Nonhealing  -Chest x-ray 8/8 reviewed.  Noted enlarging of right upper lobe pneumothorax.  Also described malposition of chest tube.    -Status post IR replacement of chest tube on 8/8.  -Chest x-ray 8/9 reviewed.  Noted an enlarging right upper and new moderate size right basilar pneumothorax.  Also describing extensive emphysema in the subcutaneous tissues.  -Chest x-ray 810 reviewed.  Noted no significant change.  - Status post removal and replacement of chest tube, currently with third chest tube.  -Continue supplemental O2  -Pain control as needed, close monitoring with narcotics  -Pulmonology on consult, appreciate further recommendations.  -CV surgery consulted.    -Right  VATS with bleb resection and talc pleurodesis procedure on 8/12.    -Appreciate further recommendations    New onset atrial fibrillation  With episode of RVR overnight  -Initially started on diltiazem gtt., weaning as able  -Check echocardiogram  -Cardiology consulted, appreciate recommendations     COPD with possible exacerbation  -Completed course of steroids.  -Continue DuoNebs as needed   -Continue home inhalers.  -Pulmonology on consult, appreciate further recommendation.      Obesity with obstructive sleep apnea  -BMI 34.    -CPAP deferred due to PTX    Chronic respiratory failure  -Patient on intermittent supplemental O2 at home  -Mostly with exertion  -Continue supplemental O2      Plan of care discussed with patient and son at bedside . Discussed management/test result(s) with Rn and cardiology consultant    Quality:  DVT Prophylaxis: Heparin  CODE status: Full  Estimated date of discharge: TBD  Discharge is dependent on: clinical stability    Chay Adams MD          This note was prepared using Dragon Medical voice recognition dictation software. As a result errors may occur. When identified these errors have been corrected. While every attempt is made to correct errors during dictation discrepancies may still exist

## 2024-08-14 NOTE — CONSULTS
[facility] - CARDIOLOGY CONSULT NOTE    Annmarie Levin Patient Status:  Inpatient    1945 MRN [id number]   Location [facility] 2W/SW Attending Chay Adams MD   Hosp Day # 9 PCP LLOYD ANDREWS MD     Date of Admission:  2024  Date of Consult:  2024  I was asked by Chay Adams MD to provide recommendations for evaluation and management of cardiac issues.  Impression:     78-year-old female history of COPD now with pneumothorax status post VATS procedure and resection.    Recommendations:  1.  Atrial fibrillation  New onset now resolved converted to sinus rhythm discontinue Cardizem drip.  Preserved EF on echocardiogram.  On metoprolol tartrate increase to 25 mg twice a day.  No need for anticoagulation at this point recommend 2 weeks MCT monitor after discharge to look for any recurrence of A-fib.  Likely culprit secondary to recent surgery.  2.  Pneumothorax  Further management as per pulmonary and thoracic surgery.  3.  COPD exacerbation  Now resolved.  4.  NADJA  Management as per pulmonary.  5.  Abnormal EKG extensive ST depressions present while tachycardic.  Likely underlying CAD recommend outpatient ischemic evaluation after acute issues resolved      L5 consult, will follow.      Reason for Consultation:   New onset atrial fibrillation.      History of Present Illness:   Patient is a 78 year old female who was admitted to the hospital for chest pain and shortness of breath was found to have pneumothorax status post VATS and bleb resection with talc pleurodesis on .  Patient developed last night A-fib with RVR started on Cardizem drip.  Cardiology was consulted for further evaluation management.  No prior history of atrial fibrillation.  Now feels better no further chest pain.  No syncope no orthopnea reported.      Cardiac tests:    Past Medical History  Past Medical History:    Arrhythmia    hx of rapid heartbeat     Perales's esophagus    Perales's  esophagus    path consistent; EGD 07/17/2007; Arpit Ellison    Breast cancer (HCC)    1992 - [id number];  Mastectomy/Chemo/Radiation.,right breast    Breast cancer of upper-outer quadrant of right female breast (HCC)    1992 - [id number];  Mastectomy/Chemo/Radiation     COPD (chronic obstructive pulmonary disease) ([facility])    Environmental allergies    Esophageal reflux    History of stomach ulcers    hx    Hyperlipidemia    Malignant neoplasm of upper-outer quadrant of right breast in female, estrogen receptor negative (HCC)    1992 - [id number];  Mastectomy/Chemo/Radiation     Mixed hyperlipidemia    Organic cardiac disease    Osteoarthritis    bilateral knee    Osteoarthritis of left knee    Synvisc-One injection, Mark Johnson    Osteoarthritis of right foot    steroid injection of subtalar joint of the right foot under fluoroscopy guidance; Mark Johnson    Osteoarthritis of right knee    Synvisc-Ander, Mark Johnson    Osteopenia of multiple sites    PONV (postoperative nausea and vomiting)    Rosacea    Skin cancer    multiple carcinomas of the skin    Sleep apnea    CPAP 2012    Sleep apnea    CPAP titration; weight reduction, CPAP 15 cm of water, aboidance of respiratory depressants including alcohol and sedatives    Visual impairment       Past Surgical History  Past Surgical History:   Procedure Laterality Date    Carpal tunnel release      Chemotherapy Right 1992    Cholecystectomy      Colonoscopy  05/25/2005    Perales's/constipation; EGD/colonoscopy;diverticulosis, internal hemorrhoids, gastritis, hiatal hernia, Perales's esophagus - continue PPI therapy     Colonoscopy  06/28/2011    change in bowel habits / Perales's; EGD colonoscopy biopsy; diverticula sigmoid colon, internal hemorrhoids, mild gastritis, histal hernia with reflux, mild esophagitis, no evidence of Perales's /  [facility] Ellison     Colonoscopy      Fracture surgery      General sleep study  05/09/2012    obesity, hypersomnolence snoring; sleep study;  obstructive sleep apnea, favorable response to CPAP 15 cm of water / Yoseph Everett f/u 6/6/12    Angelito localization wire 1 site left (cpt=19281) Left 1993    pre ca excisional bx    Mastectomy right Right     Other surgical history      Radiation right Right 1992    Upper gi endoscopy,exam  2002    Perales's esophagus; EGD 12/10/2002; Ar[facility] Ellison       Family History  Family History   Problem Relation Age of Onset    Heart Disease Other         grandfather    Stroke Other         aunt    Breast Cancer Sister 78        approx    Breast Cancer Self 47        approx    Breast Cancer Paternal Cousin Female 78        approx    Heart Disorder Father     Diabetes Mother        Social History  Pediatric History   Patient Parents    Not on file     Other Topics Concern    Not on file   Social History Narrative    Not on file         Denies smoking or drug use.    Current Medications:  Current Facility-Administered Medications   Medication Dose Route Frequency    dilTIAZem 10 mg BOLUS FROM BAG infusion  10 mg Intravenous Q1H PRN    dilTIAZem (cardIZEM) 100 mg in sodium chloride 0.9% 100 mL IVPB-ADDV  2.5-20 mg/hr Intravenous Continuous    ipratropium-albuterol (Duoneb) 0.5-2.5 (3) MG/3ML inhalation solution 3 mL  3 mL Nebulization Q4H PRN    acetaminophen (Tylenol) tab 650 mg  650 mg Oral Q6H PRN    oxyCODONE immediate release tab 5 mg  5 mg Oral Q4H PRN    Or    oxyCODONE immediate release tab 10 mg  10 mg Oral Q4H PRN    HYDROmorphone (Dilaudid) 1 MG/ML injection 0.4 mg  0.4 mg Intravenous Q2H PRN    Or    HYDROmorphone (Dilaudid) 1 MG/ML injection 0.8 mg  0.8 mg Intravenous Q2H PRN    polyethylene glycol (PEG 3350) (Miralax) 17 g oral packet 17 g  17 g Oral Daily PRN    sennosides (Senokot) tab 17.2 mg  17.2 mg Oral Nightly PRN    bisacodyl (Dulcolax) 10 MG rectal suppository 10 mg  10 mg Rectal Daily PRN    fleet enema (Fleet) 7-19 GM/118ML rectal enema 133 mL  1 enema Rectal Once PRN    ondansetron (Zofran) 4 MG/2ML  injection 4 mg  4 mg Intravenous Q6H PRN    metoclopramide (Reglan) 5 mg/mL injection 10 mg  10 mg Intravenous Q8H PRN    calcium carbonate (Tums) chewable tab 1,000 mg  1,000 mg Oral TID PRN    heparin (Porcine) 5000 UNIT/ML injection 5,000 Units  5,000 Units Subcutaneous 2 times per day    glycerin-hypromellose- (Artificial Tears) 0.2-0.2-1 % ophthalmic solution 1 drop  1 drop Both Eyes QID PRN    metoprolol tartrate (Lopressor) partial tab 12.5 mg  12.5 mg Oral 2x Daily(Beta Blocker)    montelukast (Singulair) tab 10 mg  10 mg Oral Nightly    pantoprazole (Protonix) DR tab 40 mg  40 mg Oral QAM AC    fluticasone-salmeterol (Advair Diskus) 500-50 MCG/ACT inhaler 1 puff  1 puff Inhalation BID    [Transfer Hold] hydrALAzine (Apresoline) 20 mg/mL injection 10 mg  10 mg Intravenous Q4H PRN    [Transfer Hold] zolpidem (Ambien) tab 5 mg  5 mg Oral Nightly PRN    alum-mag hydroxide-simethicone (Maalox) 200-200-20 MG/5ML oral suspension 30 mL  30 mL Oral QID PRN     Medications Prior to Admission   Medication Sig    GEMTESA 75 MG Oral Tab Take 1 tablet by mouth daily.    fluticasone propionate 50 MCG/ACT Nasal Suspension 2 sprays by Nasal route daily.    SYMBICORT 160-4.5 MCG/ACT Inhalation Aerosol Inhale 2 puffs into the lungs 2 (two) times daily.    albuterol 108 (90 Base) MCG/ACT Inhalation Aero Soln Inhale 2 puffs into the lungs every 4 (four) hours as needed.    multivitamin Oral Tab Take 1 tablet by mouth daily with breakfast.    estradiol (ESTRACE) 0.1 MG/GM Vaginal Cream Apply 1/2 gram vaginally 2 times per week. Use at bedtime.    Cholecalciferol (VITAMIN D) 50 MCG (2000 UT) Oral Cap Take 1 capsule (2,000 Units total) by mouth daily.    Vitamin B-12 (VITAMIN B12) 500 MCG Oral Tab Take 1 tablet (500 mcg total) by mouth daily.    Multiple Vitamins-Minerals (MULTI FOR HER 50+) Oral Tab Take  by mouth daily.    Vaginal Moisturizer (LUVENA PREBIOTIC LUBRICANT VA) Place  vaginally. Indications: One every four  days, per pt's medication list    Omeprazole 40 MG Oral Capsule Delayed Release Take 1 capsule (40 mg total) by mouth daily.    aspirin 325 MG Oral Tab Take  by mouth.    montelukast 10 MG Oral Tab Take by mouth.    Calcium Carbonate-Vitamin D 600-200 MG-UNIT Oral Cap Take  by mouth.    tiotropium 18 MCG Inhalation Cap Inhale into the lungs daily.    azithromycin 250 MG Oral Tab Take 1 tablet (250 mg total) by mouth daily.    Mirabegron ER (MYRBETRIQ) 8 MG/ML Oral Suspension Reconstituted ER Take by mouth.    PATIENT SUPPLIED MEDICATION 2L of O2 as needed       Allergies  Allergies   Allergen Reactions    Sulfa Antibiotics FEVER    Adhesive Tape     Levaquin [Levofloxacin] RASH    Morphine NAUSEA ONLY     Other reaction(s): \"doesn't agree with me\"       Review of Systems:   10 pt ROS performed, separate from HPI  Review of Systems:  GENERAL: no fevers, chills, sweats  HEENT: no visual or hearing changes  SKIN: denies any unusual skin lesions or rashes  RESPIRATORY: shortness of breath with exertion  CARDIOVASCULAR: no active chest pain, no claudication  GI: denies abdominal pain and denies heartburn  : no dysuria or hematuria  NEURO: denies headaches, focal weaknesses or paresthesias  All other systems reviewed and negative.  fatigue is present  All other systems were reviewed are negative  Physical Exam:   Blood pressure 103/56, pulse 69, temperature 98.6 °F (37 °C), temperature source Temporal, resp. rate 17, height 157.5 cm (5' 2\"), weight 184 lb 11.9 oz (83.8 kg), last menstrual period 10/18/1991, SpO2 93%.    Scheduled Meds:    heparin  5,000 Units Subcutaneous 2 times per day    metoprolol tartrate  12.5 mg Oral 2x Daily(Beta Blocker)    montelukast  10 mg Oral Nightly    pantoprazole  40 mg Oral QAM AC    fluticasone-salmeterol  1 puff Inhalation BID         Physical Exam:    General: Alert and oriented. No apparent distress. No respiratory or constitutional distress.  HEENT: Normocephalic, anicteric  sclera, neck supple  Neck: No JVD, carotids 2+, supple  Cardiac: Regular rate. No pathologic murmur.  Lungs: Decreased breath sounds on the right side base.  Normal excursions and effort.  Abdomen: Soft, non-tender. BS-present.  Extremities: Without clubbing, cyanosis.  Peripheral pulsespresent.  Neurologic: Alert and oriented, normal affect. Motor ok.  Skin: Warm and dry.     Results:     Laboratory Data:  Lab Results   Component Value Date    WBC 9.8 08/14/2024    HGB 11.9 (L) 08/14/2024    HCT 36.6 08/14/2024    .0 08/14/2024    CREATSERUM 0.92 08/14/2024    BUN 14 08/14/2024     08/14/2024    K 5.0 08/14/2024     08/14/2024    CO2 34.0 (H) 08/14/2024     (H) 08/14/2024    CA 9.1 08/14/2024    ALB 3.6 08/21/2023    ALKPHO 67 08/21/2023    TP 6.7 08/21/2023    AST 21 08/21/2023    ALT 18 08/21/2023    TSH 2.796 08/14/2024    DDIMER 0.81 (H) 08/04/2024    MG 1.9 08/07/2024    PHOS 3.5 08/07/2024    B12 1,120 (H) 07/25/2022         Thank you for allowing me to participate in the care of your patient.    Gatito Hatch MD  [facility]  Interventional Cardiology  8/14/2024

## 2024-08-14 NOTE — PHYSICAL THERAPY NOTE
PHYSICAL THERAPY TREATMENT NOTE - INPATIENT     Room Number: 229/229-A       Presenting Problem: pneumothorax chest tube in place  Co-Morbidities : COPD on 2L O2, OA, breast cancer S/P R mastectomy    Problem List  Principal Problem:    Pneumothorax, unspecified type      PHYSICAL THERAPY ASSESSMENT   Patient demonstrates good  progress this session, goals  remain in progress.      Patient is requiring supervision as a result of the following impairments: medical status.     Patient continues to function near baseline with transfers and gait.  Next session anticipate patient to progress gait and stair negotiation.  Physical Therapy will continue to follow patient for duration of hospitalization.    Patient continues to benefit from continued skilled PT services: at discharge to promote prior level of function and safety with additional support and return home with home health PT.    PLAN  PT Treatment Plan: Body mechanics;Bed mobility;Patient education;Gait training;Transfer training;Balance training;Stair training  Frequency (Obs): 5x/week    SUBJECTIVE  \"I feel like I have slept a week but I feel sleepy\"    OBJECTIVE  Precautions: Limb alert - right;Chest tube to suction    PAIN ASSESSMENT   Rating: Unable to rate  Location: chest tube site  Management Techniques: Activity promotion;Body mechanics;Repositioning    BALANCE  Static Sitting: Good  Dynamic Sitting: Fair +  Static Standing: Fair  Dynamic Standing: Fair -    AM-PAC '6-Clicks' INPATIENT SHORT FORM - BASIC MOBILITY  How much difficulty does the patient currently have...  Patient Difficulty: Turning over in bed (including adjusting bedclothes, sheets and blankets)?: None   Patient Difficulty: Sitting down on and standing up from a chair with arms (e.g., wheelchair, bedside commode, etc.): A Little   Patient Difficulty: Moving from lying on back to sitting on the side of the bed?: A Little   How much help from another person does the patient currently  need...   Help from Another: Moving to and from a bed to a chair (including a wheelchair)?: A Little   Help from Another: Need to walk in hospital room?: A Little   Help from Another: Climbing 3-5 steps with a railing?: A Little     AM-PAC Score:  Raw Score: 19   Approx Degree of Impairment: 41.77%   Standardized Score (AM-PAC Scale): 45.44   CMS Modifier (G-Code): CK    FUNCTIONAL ABILITY STATUS  Functional Mobility/Gait Assessment  Gait Assistance: Supervision  Distance (ft): 150ft  Assistive Device: Rolling walker  Pattern: Within Functional Limits  Rolling:  not tested   Supine to Sit:  not tested   Sit to Supine:  not tested  Sit to Stand: supervision    Skilled Therapy Provided: Patient on 2 liters of oxygen during the session. Patient currently with SBA for mobility during the session, able to ambulate about 150ft with rolling walker with SBA. Patient with shoes donned. The patient's Approx Degree of Impairment: 41.77% has been calculated based on documentation in the [facility] '6 clicks' Inpatient Daily Activity Short Form.  Research supports that patients with this level of impairment may benefit from home with home health  Patient received seated in bedside chair, agreeable to physical therapy. Education with patient provided verbally on physical therapy plan of care and physiological benefits of out of bed mobility. Patient with good carryover during session. Anticipated therapy needs remain appropriate based on the patient's performance, personal factors, and remaining functional impairments. Final disposition will be made by interdisciplinary medical team.    Patient End of Session: Up in chair;Needs met;Call light within reach;RN aware of session/findings;All patient questions and concerns addressed;Family present    CURRENT GOALS   Goals to be met by: 8/25/24  Patient Goal Patient's self-stated goal is: to go home   Goal #1 Patient is able to demonstrate supine - sit EOB @ level: supervision      Goal #1    Current Status Not met   Goal #2 Patient is able to demonstrate transfers Sit to/from Stand at assistance level: supervision with walker - rolling      Goal #2  Current Status Met   Goal #3 Patient is able to ambulate 100 feet with assist device: walker - rolling at assistance level: supervision   Goal #3   Current Status Not met   Goal #4     Goal #4   Current Status     Goal #5 Patient to demonstrate independence with home activity/exercise instructions provided to patient in preparation for discharge.   Goal #5   Current Status In progress   Goal #6     Goal #6  Current Status       Gait Training: 10 minutes  Therapeutic Activity: 13 minutes

## 2024-08-14 NOTE — PROGRESS NOTES
Episode of Afib with RVR overnight.  Rate controlled following 1 dose IV cardizem.  Labs, echo ordered. Further mgt per primary

## 2024-08-15 ENCOUNTER — APPOINTMENT (OUTPATIENT)
Dept: GENERAL RADIOLOGY | Facility: HOSPITAL | Age: 79
End: 2024-08-15
Attending: STUDENT IN AN ORGANIZED HEALTH CARE EDUCATION/TRAINING PROGRAM
Payer: MEDICARE

## 2024-08-15 VITALS
SYSTOLIC BLOOD PRESSURE: 128 MMHG | WEIGHT: 184.75 LBS | RESPIRATION RATE: 18 BRPM | OXYGEN SATURATION: 93 % | TEMPERATURE: 98 F | DIASTOLIC BLOOD PRESSURE: 55 MMHG | HEIGHT: 62 IN | HEART RATE: 70 BPM | BODY MASS INDEX: 34 KG/M2

## 2024-08-15 LAB
ANION GAP SERPL CALC-SCNC: 1 MMOL/L (ref 0–18)
ATRIAL RATE: 70 BPM
BASOPHILS # BLD AUTO: 0.02 X10(3) UL (ref 0–0.2)
BASOPHILS NFR BLD AUTO: 0.3 %
BUN BLD-MCNC: 13 MG/DL (ref 9–23)
BUN/CREAT SERPL: 19.4 (ref 10–20)
CALCIUM BLD-MCNC: 8.9 MG/DL (ref 8.7–10.4)
CHLORIDE SERPL-SCNC: 104 MMOL/L (ref 98–112)
CO2 SERPL-SCNC: 34 MMOL/L (ref 21–32)
CREAT BLD-MCNC: 0.67 MG/DL
DEPRECATED RDW RBC AUTO: 57.6 FL (ref 35.1–46.3)
EGFRCR SERPLBLD CKD-EPI 2021: 90 ML/MIN/1.73M2 (ref 60–?)
EOSINOPHIL # BLD AUTO: 0.23 X10(3) UL (ref 0–0.7)
EOSINOPHIL NFR BLD AUTO: 3.5 %
ERYTHROCYTE [DISTWIDTH] IN BLOOD BY AUTOMATED COUNT: 15.5 % (ref 11–15)
GLUCOSE BLD-MCNC: 112 MG/DL (ref 70–99)
HCT VFR BLD AUTO: 32.4 %
HGB BLD-MCNC: 10.9 G/DL
IMM GRANULOCYTES # BLD AUTO: 0.05 X10(3) UL (ref 0–1)
IMM GRANULOCYTES NFR BLD: 0.8 %
LYMPHOCYTES # BLD AUTO: 0.92 X10(3) UL (ref 1–4)
LYMPHOCYTES NFR BLD AUTO: 14.1 %
MAGNESIUM SERPL-MCNC: 1.8 MG/DL (ref 1.6–2.6)
MCH RBC QN AUTO: 34.4 PG (ref 26–34)
MCHC RBC AUTO-ENTMCNC: 33.6 G/DL (ref 31–37)
MCV RBC AUTO: 102.2 FL
MONOCYTES # BLD AUTO: 1.01 X10(3) UL (ref 0.1–1)
MONOCYTES NFR BLD AUTO: 15.5 %
NEUTROPHILS # BLD AUTO: 4.28 X10 (3) UL (ref 1.5–7.7)
NEUTROPHILS # BLD AUTO: 4.28 X10(3) UL (ref 1.5–7.7)
NEUTROPHILS NFR BLD AUTO: 65.8 %
OSMOLALITY SERPL CALC.SUM OF ELEC: 289 MOSM/KG (ref 275–295)
P AXIS: 11 DEGREES
P-R INTERVAL: 168 MS
PLATELET # BLD AUTO: 220 10(3)UL (ref 150–450)
POTASSIUM SERPL-SCNC: 4.6 MMOL/L (ref 3.5–5.1)
Q-T INTERVAL: 372 MS
QRS DURATION: 98 MS
QTC CALCULATION (BEZET): 401 MS
R AXIS: -38 DEGREES
RBC # BLD AUTO: 3.17 X10(6)UL
SODIUM SERPL-SCNC: 139 MMOL/L (ref 136–145)
T AXIS: 21 DEGREES
VENTRICULAR RATE: 70 BPM
WBC # BLD AUTO: 6.5 X10(3) UL (ref 4–11)

## 2024-08-15 PROCEDURE — 71045 X-RAY EXAM CHEST 1 VIEW: CPT | Performed by: STUDENT IN AN ORGANIZED HEALTH CARE EDUCATION/TRAINING PROGRAM

## 2024-08-15 RX ORDER — METOPROLOL SUCCINATE 50 MG/1
50 TABLET, EXTENDED RELEASE ORAL
Qty: 30 TABLET | Refills: 1 | Status: SHIPPED | OUTPATIENT
Start: 2024-08-15

## 2024-08-15 RX ORDER — METOPROLOL SUCCINATE 50 MG/1
50 TABLET, EXTENDED RELEASE ORAL
Status: DISCONTINUED | OUTPATIENT
Start: 2024-08-15 | End: 2024-08-15

## 2024-08-15 RX ORDER — MAGNESIUM OXIDE 400 MG/1
400 TABLET ORAL ONCE
Status: COMPLETED | OUTPATIENT
Start: 2024-08-15 | End: 2024-08-15

## 2024-08-15 NOTE — PLAN OF CARE
Patient alert and oriented. Tele. Call light within reach. Frequent rounding by staff.  Problem: Patient Centered Care  Goal: Patient preferences are identified and integrated in the patient's plan of care  Description: Interventions:  - What would you like us to know as we care for you? From home alone  - Provide timely, complete, and accurate information to patient/family  - Incorporate patient and family knowledge, values, beliefs, and cultural backgrounds into the planning and delivery of care  - Encourage patient/family to participate in care and decision-making at the level they choose  - Honor patient and family perspectives and choices  Outcome: Progressing     Problem: Patient/Family Goals  Goal: Patient/Family Long Term Goal  Description: Patient's Long Term Goal: discharge    Interventions:  - - Monitor vitals  - Monitor appropriate labs  - Pain management  - Follow MD order  - Diagnostics per order  - Update/Informing patient and family on plan of care  - Discharge planning  - See additional Care Plan goals for specific interventions  Outcome: Progressing  Goal: Patient/Family Short Term Goal  Description: Patient's Short Term Goal: feel better    Interventions:   - - Monitor vitals  - Monitor appropriate labs  - Pain management  - Follow MD order  - Diagnostics per order  - Update/Informing patient and family on plan of care  - Discharge planning  - See additional Care Plan goals for specific interventions  Outcome: Progressing     Problem: PAIN - ADULT  Goal: Verbalizes/displays adequate comfort level or patient's stated pain goal  Description: INTERVENTIONS:  - Encourage pt to monitor pain and request assistance  - Assess pain using appropriate pain scale  - Administer analgesics based on type and severity of pain and evaluate response  - Implement non-pharmacological measures as appropriate and evaluate response  - Consider cultural and social influences on pain and pain management  -  Manage/alleviate anxiety  - Utilize distraction and/or relaxation techniques  - Monitor for opioid side effects  - Notify MD/LIP if interventions unsuccessful or patient reports new pain  - Anticipate increased pain with activity and pre-medicate as appropriate  Outcome: Progressing     Problem: CARDIOVASCULAR - ADULT  Goal: Maintains optimal cardiac output and hemodynamic stability  Description: INTERVENTIONS:  - Monitor vital signs, rhythm, and trends  - Monitor for bleeding, hypotension and signs of decreased cardiac output  - Evaluate effectiveness of vasoactive medications to optimize hemodynamic stability  - Monitor arterial and/or venous puncture sites for bleeding and/or hematoma  - Assess quality of pulses, skin color and temperature  - Assess for signs of decreased coronary artery perfusion - ex. Angina  - Evaluate fluid balance, assess for edema, trend weights  Outcome: Progressing     Problem: RESPIRATORY - ADULT  Goal: Achieves optimal ventilation and oxygenation  Description: INTERVENTIONS:  - Assess for changes in respiratory status  - Assess for changes in mentation and behavior  - Position to facilitate oxygenation and minimize respiratory effort  - Oxygen supplementation based on oxygen saturation or ABGs  - Provide Smoking Cessation handout, if applicable  - Encourage broncho-pulmonary hygiene including cough, deep breathe, Incentive Spirometry  - Assess the need for suctioning and perform as needed  - Assess and instruct to report SOB or any respiratory difficulty  - Respiratory Therapy support as indicated  - Manage/alleviate anxiety  - Monitor for signs/symptoms of CO2 retention  Outcome: Progressing

## 2024-08-15 NOTE — DISCHARGE SUMMARY
[facility]  part of [facility]    Discharge Summary    Annmarie Levin Patient Status:  Inpatient    1945 MRN [id number]   Location [facility]5W Attending Chay Adams MD   Hosp Day # 10 PCP LLOYD ANDREWS MD     Date of Admission: 2024     Date of Discharge: 08/15/24      Lace+ Score: 69  59-90 High Risk  29-58 Medium Risk  0-28   Low Risk.    TCM Follow-Up Recommendation:  LACE > 58: High Risk of readmission after discharge from the hospital.    DISCHARGE DX: Principal Problem:    Pneumothorax, unspecified type       The patient was seen and examined on day of discharge and this discharge summary is in conjunction with any daily progress note from day of discharge.    HPI per admitting physician: \"Annmarie Levin is a(n) 78 year old female, history significant for COPD and breast cancer s/p/mastectomy/radiation presents with a complaint of acute onset chest pain and shortness of breath earlier in the day.  Claims of late she has been coughing more than usual attributed to the weather, pain somewhat improved however started to become more short of breath, started using her O2 despite that there were times when she was desaturating into the upper 70s prompting her to go to urgent care for evaluation.  She was advised to come to the ER.  X-rays done in ER indicated presence of a right pneumothorax.  Currently pain increase status post chest tube placement\"    Hospital Course:     Spontaneous pneumothorax  Nonhealing  -Chest x-ray  reviewed.  Noted enlarging of right upper lobe pneumothorax.  Also described malposition of chest tube.    -Status post IR replacement of chest tube on .  -Chest x-ray  reviewed.  Noted an enlarging right upper and new moderate size right basilar pneumothorax.  Also describing extensive emphysema in the subcutaneous tissues.  -Chest x-ray 810 reviewed.  Noted no significant change.  - Status post removal and replacement of chest  tube,  third chest tube.  -Continue supplemental O2, wean as able  -Pain control as needed  -Pulmonology on consult  -CV surgery consulted.    -S/p Right VATS with bleb resection and talc pleurodesis procedure on 8/12.    -CT removed per CV surgery. Cleared for DC. Follow up as outpt     New onset atrial fibrillation  With episode of RVR overnight  -Initially started on diltiazem gtt., weaned off   -echocardiogram reviewed  -Continue lopressor  -Converted to SR  -Cardiology consulted, rec no need for AC. MCT on dc and follow up as outpt.       COPD with possible exacerbation  -Completed course of steroids.  -Continue home inhalers.  -Pulmonology on consult     Obesity with obstructive sleep apnea  -BMI 34.    -CPAP deferred due to PTX     Chronic respiratory failure  -Patient on intermittent supplemental O2 at home  -Mostly with exertion  -Continue supplemental O2        Physical Exam:    Vitals:    08/15/24 0013 08/15/24 0559 08/15/24 0815 08/15/24 0817   BP: 126/80 128/55     BP Location: Left arm Left arm     Pulse: 84 87 70    Resp: 20 20 18    Temp: 98 °F (36.7 °C) 97.8 °F (36.6 °C) 98 °F (36.7 °C)    TempSrc: Oral Oral Oral    SpO2: 96% 92% 95% 93%   Weight:       Height:         Patient Weight for the past 72 hrs:   Weight   08/13/24 0630 184 lb 11.9 oz (83.8 kg)       Intake/Output Summary (Last 24 hours) at 8/15/2024 1028  Last data filed at 8/15/2024 0741  Gross per 24 hour   Intake --   Output 790 ml   Net -790 ml       GENERAL:  Awake and alert, in no acute distress.  HEART:  Regular rhythm, regular rate  LUNGS:  Air entry was minimally decreased, worse over the right.  No increased work of breathing or wheezes   ABDOMEN: Soft and non-tender.    PSYCHIATRIC: Normal mood    CULTURE:   No results found for this visit on 08/04/24.    IMAGING STUDIES: SOME MAY NEED FOLLOW UP WITH PCP   XR CHEST AP PORTABLE  (CPT=71045)    Result Date: 8/15/2024  CONCLUSION:   Mild improvement in the small right pneumothorax.   The right chest tube has been removed.  Multiple additional findings are not significantly changed.     Dictated by (CST): Paco Mathur MD on 8/15/2024 at 8:01 AM     Finalized by (CST): Paco Mathur MD on 8/15/2024 at 8:07 AM          XR CHEST AP PORTABLE  (CPT=71045)    Result Date: 8/14/2024  CONCLUSION:  1. Cardiomegaly tortuous thoracic aorta. 2. Chronic appearing pulmonary interstitial fibrosis.  Chronic right apical pleural parenchymal abnormality with traction bronchiectasis.  Persistent right apical pneumothorax measuring 10-15% with slight progression measures 27 mm.  Partial atelectatic changes in the right apex 3. Chronic bronchopleural fistula cannot be excluded.    Dictated by (CST): Earl Barber MD on 8/14/2024 at 8:16 AM     Finalized by (CST): Earl Barber MD on 8/14/2024 at 8:20 AM          XR CHEST AP PORTABLE  (CPT=71045)    Result Date: 8/13/2024  CONCLUSION:  1. Cardiomegaly.  Tortuous thoracic aorta. 2. Emphysematous changes with chronic pulmonary interstitium. 3. Persistent small right apical pneumothorax measuring 10-15% similar prior exam.  Pigtail catheter drain has been replaced with the chest tube in the upper right hemithorax.  Chronic right apical pleural parenchymal scarring with chronic right apical traction bronchiectasis.  Chronic bronchopleural fistula cannot be excluded.    Dictated by (CST): Earl Barber MD on 8/13/2024 at 12:16 PM     Finalized by (CST): Ealr Barber MD on 8/13/2024 at 12:23 PM          XR CHEST AP PORTABLE  (CPT=71045)    Result Date: 8/12/2024  CONCLUSION:   The right apical pneumothorax is slightly decreased in size compared to the prior exam.  No new abnormality.    Dictated by (CST): Wagner Matthews MD on 8/12/2024 at 7:49 AM     Finalized by (CST): Wagner Matthews MD on 8/12/2024 at 7:52 AM          XR CHEST AP PORTABLE  (CPT=71045)    Result Date: 8/11/2024  CONCLUSION:  1. Persistent right-sided apical pneumothorax; a  right-sided pigtail chest tube remains in place.  2. Extensive subcutaneous emphysema throughout the chest wall, right worse than left.    Dictated by (CST): Nicho Lynch MD on 8/11/2024 at 9:01 AM     Finalized by (CST): Nicho Lynch MD on 8/11/2024 at 9:05 AM          XR CHEST AP PORTABLE  (CPT=71045)    Result Date: 8/10/2024  CONCLUSION:  1. Pigtail right chest tube projects at the right perihilar region.  Stable moderate approximate 2.9 cm thickness right apical pneumothorax.  Stable extensive subcutaneous emphysema throughout the right greater than left chest wall and lower neck. 2. Lesser incidental findings as above.   A preliminary report was issued by the [facility] teleradiology service. There are no major discrepancies.  elm-remote  Dictated by (CST): Mario Pino MD on 8/10/2024 at 10:23 AM     Finalized by (CST): Mario Pino MD on 8/10/2024 at 10:25 AM          IR CHEST TUBE    Addendum Date: 8/9/2024    CORRECTION Corrected on: 8/09/2024;   PROCEDURE: IR CHEST TUBE EXCHANGE  INDICATIONS:  Spontaneous pneumothorax post chest tube insertion in the ED on 08/04/2024.  Subsequent dislodgement with subcutaneous emphysema, post removal and new chest tube insertion by IR on 08/08/2024.  Now with worsening pneumothorax.  Here for check and exchange.  (S): Melissa  ANESTHESIA/SEDATION: Level of anesthesia/sedation: Moderate sedation (conscious sedation) Anesthesia/sedation administered by: Nurse or other independent trained observer who has no other duties with continuous monitoring of the patient's level of consciousness and physiologic status, under the personal supervision of the attending physician. Total intra-service sedation time: 10 min  FLUOROSCOPY TIME:  0.8 min AIR KERMA:  2.5 mGy  ESTIMATED BLOOD LOSS: Less than 5 mL  COMPLICATIONS: None  FINDINGS:  Informed consent was obtained.  The right chest and existing tube were sterilely prepped and draped.   fluoroscopy  demonstrated a large right pneumothorax.  The existing chest tube was severely kinked and retracted to the chest wall.  Local lidocaine was administered.  The tube was cut.  Under fluoroscopic guidance, a wire was advanced through the tube into the right chest.  The tube was removed over the wire.  A new 12 Marshallese drainage catheter was advanced over the wire and formed in the apex of the chest.  There was immediate return of air from the catheter.  The catheter was secured to the skin with sutures and attached to a collection chamber.  CONCLUSION:  Existing right chest tube is kinked and retracted to chest wall.  Exchanged for new 12 Marshallese chest tube and repositioned into apex.  Maintain chest tube on water seal.  Daily chest x-rays.     Dictated by (CST): Wayne Torres MD on 8/09/2024 at 6:11 PM     Finalized by (CST): Wayne Torres MD on 8/09/2024 at 6:17 PM    Dictated by (CST): Wayne Torres MD on 8/09/2024 at 7:35 PM     Finalized by (CST): Wayne Torres MD on 8/09/2024 at 7:35 PM              Result Date: 8/9/2024  CONCLUSION:  Existing right chest tube is kinked and retracted to chest wall.  Exchanged for new 12 Marshallese chest tube and repositioned into apex.  Maintain chest tube on water seal.  Daily chest x-rays.     Dictated by (CST): Wayne Torres MD on 8/09/2024 at 6:11 PM     Finalized by (CST): Wayne Torres MD on 8/09/2024 at 6:17 PM          XR CHEST AP PORTABLE  (CPT=71045)    Result Date: 8/9/2024  CONCLUSION:  1. Enlarging right upper and new moderate sized right basal pneumothorax as described above.  Repositioning of the right-sided pigtail chest tube now located in the right mid chest.  Extensive emphysema in the subcutaneous tissues of the right neck and right lateral chest wall has increased significantly since previous study.  Correlate clinically.  2. Results regarding the enlarging right-sided pneumothorax were called the nursing floor by technologist  Camilla.    Dictated by (CST): Buck Jimenez MD on 8/09/2024 at 8:39 AM     Finalized by (CST): Buck Jimenez MD on 8/09/2024 at 8:43 AM          IR CHEST TUBE    Result Date: 8/9/2024  CONCLUSION:  1. Fluoroscopic guided placement of small bore chest tube for evacuation of recurrent right-sided pneumothorax.    Dictated by (CST): Robert Montoya MD on 8/09/2024 at 8:15 AM     Finalized by (CST): Robert Montoya MD on 8/09/2024 at 8:17 AM          CT CHEST(CONTRAST ONLY) (CPT=71260)    Result Date: 8/8/2024  CONCLUSION:   The pigtail catheter is malpositioned in the right chest wall subcutaneous tissues  Small right pneumothorax.  No effusion.  No midline shift  Mild peripheral fibrosis and traction bronchiectasis in the right lung, with confluent apical fibrosis that contains multiple small blebs, the likely source of the PTX  Thin walled complex probable cyst in the anterior mediastinum containing tiny amounts of layering milk of calcium, slowly growing since 2006. 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 Interval increase in a moderate-sized now 2.7 cm in height right apical pneumothorax.  The right pigtail chest tube has been pulled back in the sideholes are now outside of the pleural space in the right lower chest.  Correlate clinically as to intended location.  Slight interval increase in right-sided subcutaneous emphysema.  Follow-up study advised.    Dictated by (CST): Buck Jimenez MD on 8/08/2024 at 8:50 AM     Finalized by (CST): Buck Jimenez MD on 8/08/2024 at 8:54 AM          XR CHEST AP PORTABLE  (CPT=71045)    Result Date: 8/7/2024  CONCLUSION:  1. Cardiomegaly.  Tortuous thoracic aorta. 2. Diffuse chronic pulmonary  interstitial prominence.  Chronic right apical pleural parenchymal abnormality with known traction bronchiectasis in the right apex. 3. Persistent small right apical pneumothorax measuring 17 mm with slight progression.  Percutaneous pigtail catheter drain in place.  Follow-up to resolution to exclude bronchopleural fistula.    Dictated by (CST): Earl Barber MD on 8/07/2024 at 10:15 AM     Finalized by (CST): Earl Barber MD on 8/07/2024 at 10:18 AM          XR CHEST AP PORTABLE  (CPT=71045)    Result Date: 8/6/2024  CONCLUSION:  1. Cardiomegaly.  Tortuous thoracic aorta. 2. Diffuse chronic pulmonary interstitial prominence.  Chronic right apical pleural parenchymal scarring with known traction bronchiectasis in the right apex.  Persistent small right apical pneumothorax measuring 14 mm.  Follow-up to resolution to exclude bronchopleural fistula. 3. Right apical percutaneous catheter remains in place.    Dictated by (CST): Earl Barber MD on 8/06/2024 at 9:42 AM     Finalized by (CST): Earl Barber MD on 8/06/2024 at 9:47 AM          XR CHEST AP PORTABLE  (CPT=71045)    Result Date: 8/5/2024  CONCLUSION:  1. Cardiomegaly.  Tortuous thoracic aorta.  Mediastinal configuration unchanged 2. Diffuse chronic pulmonary interstitial prominence redemonstrated. 3. Small right apical pneumothorax measuring less than 10% with right apical percutaneous pigtail catheter in the pleural space.  Chronic right apical pleural parenchymal scarring with traction bronchiectasis noted on prior chest CT from 9/22/2023.  4. Recommend follow-up to resolution to exclude bronchopleural fistula.    Dictated by (CST): Earl Barber MD on 8/05/2024 at 11:38 AM     Finalized by (CST): Earl Barber MD on 8/05/2024 at 11:45 AM          XR CHEST AP PORTABLE  (CPT=71045)    Result Date: 8/5/2024  CONCLUSION:  1. Right-sided pigtail chest tube in place with re-expansion of right lung. 2. Chronically elevated  right hemidiaphragm. 3. Right apical pleural parenchymal scarring. 4. Atherosclerotic calcification aorta. 5. Right mastectomy and axillary dissection.  1.   Dictated by (CST): Roland Wallace MD on 8/05/2024 at 11:39 AM     Finalized by (CST): Roland Wallace MD on 8/05/2024 at 11:43 AM          XR CHEST AP PORTABLE  (CPT=71045)    Result Date: 8/5/2024  CONCLUSION:  1. Large right pneumothorax.  No major discrepancy with preliminary [facility] report.  Dictated by (CST): Roland Wallace MD on 8/05/2024 at 10:41 AM     Finalized by (CST): Roland Wallace MD on 8/05/2024 at 10:43 AM           LABS :     Lab Results   Component Value Date    WBC 6.5 08/15/2024    HGB 10.9 (L) 08/15/2024    HCT 32.4 (L) 08/15/2024    .0 08/15/2024    CREATSERUM 0.67 08/15/2024    BUN 13 08/15/2024     08/15/2024    K 4.6 08/15/2024     08/15/2024    CO2 34.0 (H) 08/15/2024     (H) 08/15/2024    CA 8.9 08/15/2024    ALB 3.6 08/21/2023    ALKPHO 67 08/21/2023    BILT 0.5 08/21/2023    TP 6.7 08/21/2023    AST 21 08/21/2023    ALT 18 08/21/2023    TSH 2.796 08/14/2024    DDIMER 0.81 (H) 08/04/2024    MG 1.8 08/15/2024    PHOS 3.5 08/07/2024    B12 1,120 (H) 07/25/2022       Recent Labs   Lab 08/13/24  0450 08/14/24  0333 08/15/24  0540   RBC 3.18* 3.53* 3.17*   HGB 11.1* 11.9* 10.9*   HCT 32.4* 36.6 32.4*   .9* 103.7* 102.2*   MCH 34.9* 33.7 34.4*   MCHC 34.3 32.5 33.6   RDW 15.8* 15.9* 15.5*   NEPRELIM 6.82 7.01 4.28   WBC 8.9 9.8 6.5   .0 263.0 220.0     Recent Labs   Lab 08/13/24  0451 08/14/24  0333 08/15/24  0540   * 112* 112*   BUN 12 14 13   CREATSERUM 0.74 0.92 0.67   CA 8.8 9.1 8.9    138 139   K 4.4 5.0 4.6    102 104   CO2 32.0 34.0* 34.0*     No results found for: \"PT\", \"INR\"    Disposition: Discharge to Home    Condition at Discharge: Stable     Discharge Medications:      Discharge Medications        START taking these medications        Instructions Prescription  details   metoprolol succinate ER 50 MG Tb24  Commonly known as: Toprol XL      Take 1 tablet (50 mg total) by mouth Daily Beta Blocker.   Quantity: 30 tablet  Refills: 1            CONTINUE taking these medications        Instructions Prescription details   albuterol 108 (90 Base) MCG/ACT Aers  Commonly known as: Ventolin HFA      Inhale 2 puffs into the lungs every 4 (four) hours as needed.   Refills: 0     aspirin 325 MG Tabs      Take  by mouth.   Refills: 0     Calcium Carbonate-Vitamin D 600-200 MG-UNIT Caps      Take  by mouth.   Refills: 0     cyanocobalamin 500 MCG Tabs  Commonly known as: Vitamin B12      Take 1 tablet (500 mcg total) by mouth daily.   Refills: 0     estradiol 0.1 MG/GM Crea  Commonly known as: Estrace      Apply 1/2 gram vaginally 2 times per week. Use at bedtime.   Quantity: 42.5 g  Refills: 3     fluticasone propionate 50 MCG/ACT Susp  Commonly known as: Flonase      2 sprays by Nasal route daily.   Refills: 0     Gemtesa 75 MG Tabs  Generic drug: Vibegron      Take 1 tablet by mouth daily.   Refills: 0     LUVENA PREBIOTIC LUBRICANT VA      Place  vaginally. Indications: One every four days, per pt's medication list   Refills: 0     montelukast 10 MG Tabs  Commonly known as: Singulair      Take by mouth.   Refills: 0     Multi For Her 50+ Tabs      Take  by mouth daily.   Refills: 0     multivitamin Tabs      Take 1 tablet by mouth daily with breakfast.   Refills: 0     Omeprazole 40 MG Cpdr      Take 1 capsule (40 mg total) by mouth daily.   Refills: 0     PATIENT SUPPLIED MEDICATION      2L of O2 as needed   Refills: 0     Symbicort 160-4.5 MCG/ACT Aero  Generic drug: Budesonide-Formoterol Fumarate      Inhale 2 puffs into the lungs 2 (two) times daily.   Refills: 0     tiotropium 18 MCG Caps  Commonly known as: Spiriva Handihaler      Inhale into the lungs daily.   Refills: 0     Vitamin D 50 MCG (2000 UT) Caps      Take 1 capsule (2,000 Units total) by mouth daily.   Refills: 0             STOP taking these medications      azithromycin 250 MG Tabs  Commonly known as: Zithromax        Myrbetriq 8 MG/ML Srer  Generic drug: Mirabegron ER                  Where to Get Your Medications        These medications were sent to [facility]/pharmacy #[id number] - [place], [place] - [address] across from Sravan Drummond, [phone], [phone]  [address], [facility] [place] [address]      Phone: [phone]   metoprolol succinate ER 50 MG Tb24         Follow up Visits  Gatito Hatch MD  [address]  [address]  [facility] [id number] [phone]    Follow up in 1 month(s)  Office will call to schedule    LLOYD ANDREWS MD    Consultants         Provider   Role Specialty     Herman, Rancho RO MD      Consulting Physician Interventional, Cardiology     Adwoa Shanks, Brian CASTELLON MD      Consulting Physician SURGERY, GENERAL     Erasmo Balderrama MD      Consulting Physician PULMONARY DISEASES              Other Discharge Instructions:       Discharge Instructions         Sometimes managing your health at home requires assistance.  The [facility] team has recognized your preference to use [facility].  They can be reached by phone at [phone].  The fax number for your reference is [phone]..  A representative from the home health agency will contact you or your family to schedule your first visit.       Thoracic Surgery Post-Operative Appointment     Follow up appointment scheduled: with Dr. Orona on August 22nd at 2:15pm located at the [facility].  Thank you.      Thoracic Surgery Discharge Instructions     Pain Management  You will be sent home with a prescription for pain medicine.  This will likely be a narcotic pain medicine such as Oxycodone or Norco (hydrocodone/acetaminophen).  If no contraindications, start with extra strength acetaminophen (Tylenol) scheduled, and use narcotics for breakthrough pain. Ice packs can be helpful as well for surface level, skin  incision pain.      - Take extra strength acetaminophen (Tylenol) 500 mg every 4 to 6 hours, as long as you have no known liver conditions.   - **Max dose for acetaminophen (Tylenol) is 4000 mg per day. If taking Norco, please note that each tab contains 325 mg of acetaminophen (Tylenol).  - Do NOT take any NSAIDS such as Ibuprofen (Advil).     Restrictions  Upon discharge home, do not get in an airplane or soak in a tub/pool until after your first follow up to see me in the office. You may shower, washing incisions gently with soap and water.    No dietary restrictions. If taking prescription pain medications you may become constipated. Fluids, fiber and over the counter stool softeners are helpful for this.    Other than that, no activity restrictions. You should attempt to be as active as possible.  What ever you feel up to doing, you can do. Walking is strongly encouraged. You may drive (not within 4 hours of taking prescription pain medications).     Exercises  Do range of motion exercises twice a day with the arm on the side of your operation. The easiest is to \"walk\" your hand up the wall with your fingers. Eventually you should be able to get your arm straight up over your head.    You will be sent home with your breathing \"toys\" -- like your incentive spirometer. Use this frequently at home. 10 times per hour while awake, if possible. If watching TV, use it at every commercial break.    Follow-up Appointment  My office will reach out to you regarding a follow up appointment, if not already scheduled. Appointment will be approximately 1-2 weeks after discharge.     If you are experiencing any of these symptoms, please call our office at [phone]. Office hours are Monday through Friday, 8 am to 4:30 pm.  If you are calling the office after hours, please call the Thoracic Surgery On-Call number [phone], and state that you are a patient from [facility] or [facility].      - Persistent fevers of  101.5 or greater  - Worsening pain  - Worsening shortness of breath  - Signs of infection in your wound such as drainage, worsening of redness, swelling or     pain.  - Anything else that concerns you.            ----------------------------------------------------  38 MIN SPENT ON THIS DC   Chay Adams MD    8/15/2024

## 2024-08-15 NOTE — PHYSICAL THERAPY NOTE
PHYSICAL THERAPY TREATMENT NOTE - INPATIENT     Room Number: 503/503-A       Presenting Problem: pneumothorax chest tube in place  Co-Morbidities : COPD on 2L O2, OA, breast cancer S/P R mastectomy    Problem List  Principal Problem:    Pneumothorax, unspecified type      PHYSICAL THERAPY ASSESSMENT   Patient demonstrates good  progress this session, goals  remain in progress.      Patient is requiring contact guard assist and minimal assist as a result of the following impairments: decreased functional strength, decreased endurance/aerobic capacity, and pain.     Patient continues to function below baseline with bed mobility, transfers, and gait.  Next session anticipate patient to progress bed mobility, transfers, and gait.  Physical Therapy will continue to follow patient for duration of hospitalization.    Patient continues to benefit from continued skilled PT services: at discharge to promote prior level of function.  Anticipate patient will return home with home health PT.    PLAN  PT Treatment Plan: Bed mobility;Body mechanics;Endurance;Gait training  Frequency (Obs): 5x/week    SUBJECTIVE  Pt reports being ready for PT RX    OBJECTIVE  Precautions: Limb alert - right    WEIGHT BEARING RESTRICTION                PAIN ASSESSMENT   Rating: Unable to rate  Location: chest tube site  Management Techniques: Activity promotion;Body mechanics;Repositioning    BALANCE  Static Sitting: Good  Dynamic Sitting: Fair +  Static Standing: Fair  Dynamic Standing: Fair -    ACTIVITY TOLERANCE                          O2 WALK       AM-PAC '6-Clicks' INPATIENT SHORT FORM - BASIC MOBILITY  How much difficulty does the patient currently have...  Patient Difficulty: Turning over in bed (including adjusting bedclothes, sheets and blankets)?: None   Patient Difficulty: Sitting down on and standing up from a chair with arms (e.g., wheelchair, bedside commode, etc.): A Little   Patient Difficulty: Moving from lying on back to sitting  on the side of the bed?: A Little   How much help from another person does the patient currently need...   Help from Another: Moving to and from a bed to a chair (including a wheelchair)?: A Little   Help from Another: Need to walk in hospital room?: A Little   Help from Another: Climbing 3-5 steps with a railing?: A Little     AM-PAC Score:  Raw Score: 19   Approx Degree of Impairment: 41.77%   Standardized Score (AM-PAC Scale): 45.44   CMS Modifier (G-Code): CK    FUNCTIONAL ABILITY STATUS  Functional Mobility/Gait Assessment  Gait Assistance: Contact guard assist  Distance (ft): 2 x 100  Assistive Device: Rolling walker  Pattern: Shuffle  Rolling: minimal assist  Supine to Sit: minimal assist  Sit to Supine: minimal assist  Sit to Stand: minimal assist    Skilled Therapy Provided: Pt seen daily.Min a for bed mobility and transfer.EOB sitting balance activity with emphasis on core stabilization.Pt educated on deep breathing and relaxation technique.Pt amb 2 x 100 ft with RW and CGA;provided cuing for gait pattern as well as for postural awareness.There ex.    The patient's Approx Degree of Impairment: 41.77% has been calculated based on documentation in the [facility] '6 clicks' Inpatient Daily Activity Short Form.  Research supports that patients with this level of impairment may benefit from Home with Home Health PT.  Final disposition will be made by interdisciplinary medical team.    THERAPEUTIC EXERCISES  Lower Extremity Ankle pumps  Glut sets  Quad sets     Position Supine       Patient End of Session: Up in chair;Call light within reach;RN aware of session/findings;All patient questions and concerns addressed    CURRENT GOALS      Patient's self-stated goal is: to go home    Patient is able to demonstrate supine - sit EOB @ level: supervision       Not met    Patient is able to demonstrate transfers Sit to/from Stand at assistance level: supervision with walker - rolling       Met    Patient is able to ambulate  100 feet with assist device: walker - rolling at assistance level: supervision    Not met              Patient to demonstrate independence with home activity/exercise instructions provided to patient in preparation for discharge.    In progress               Gait Training: 15 minutes  Therapeutic Activity: 15 minutes  Neuromuscular Re-education:  minutes  Therapeutic Exercise:  minutes  [place] Repositioning:  minutes  Manual Therapy:  minutes  Can add/delete any of these

## 2024-08-15 NOTE — PLAN OF CARE
Problem: Patient Centered Care  Goal: Patient preferences are identified and integrated in the patient's plan of care  Description: Interventions:  - What would you like us to know as we care for you? From home alone  - Provide timely, complete, and accurate information to patient/family  - Incorporate patient and family knowledge, values, beliefs, and cultural backgrounds into the planning and delivery of care  - Encourage patient/family to participate in care and decision-making at the level they choose  - Honor patient and family perspectives and choices  Outcome: Adequate for Discharge     Problem: Patient/Family Goals  Goal: Patient/Family Long Term Goal  Description: Patient's Long Term Goal: discharge    Interventions:  - - Monitor vitals  - Monitor appropriate labs  - Pain management  - Follow MD order  - Diagnostics per order  - Update/Informing patient and family on plan of care  - Discharge planning  - See additional Care Plan goals for specific interventions  Outcome: Adequate for Discharge  Goal: Patient/Family Short Term Goal  Description: Patient's Short Term Goal: feel better    Interventions:   - - Monitor vitals  - Monitor appropriate labs  - Pain management  - Follow MD order  - Diagnostics per order  - Update/Informing patient and family on plan of care  - Discharge planning  - See additional Care Plan goals for specific interventions  Outcome: Adequate for Discharge     Problem: PAIN - ADULT  Goal: Verbalizes/displays adequate comfort level or patient's stated pain goal  Description: INTERVENTIONS:  - Encourage pt to monitor pain and request assistance  - Assess pain using appropriate pain scale  - Administer analgesics based on type and severity of pain and evaluate response  - Implement non-pharmacological measures as appropriate and evaluate response  - Consider cultural and social influences on pain and pain management  - Manage/alleviate anxiety  - Utilize distraction and/or relaxation  techniques  - Monitor for opioid side effects  - Notify MD/LIP if interventions unsuccessful or patient reports new pain  - Anticipate increased pain with activity and pre-medicate as appropriate  Outcome: Adequate for Discharge     Problem: CARDIOVASCULAR - ADULT  Goal: Maintains optimal cardiac output and hemodynamic stability  Description: INTERVENTIONS:  - Monitor vital signs, rhythm, and trends  - Monitor for bleeding, hypotension and signs of decreased cardiac output  - Evaluate effectiveness of vasoactive medications to optimize hemodynamic stability  - Monitor arterial and/or venous puncture sites for bleeding and/or hematoma  - Assess quality of pulses, skin color and temperature  - Assess for signs of decreased coronary artery perfusion - ex. Angina  - Evaluate fluid balance, assess for edema, trend weights  Outcome: Adequate for Discharge     Problem: RESPIRATORY - ADULT  Goal: Achieves optimal ventilation and oxygenation  Description: INTERVENTIONS:  - Assess for changes in respiratory status  - Assess for changes in mentation and behavior  - Position to facilitate oxygenation and minimize respiratory effort  - Oxygen supplementation based on oxygen saturation or ABGs  - Provide Smoking Cessation handout, if applicable  - Encourage broncho-pulmonary hygiene including cough, deep breathe, Incentive Spirometry  - Assess the need for suctioning and perform as needed  - Assess and instruct to report SOB or any respiratory difficulty  - Respiratory Therapy support as indicated  - Manage/alleviate anxiety  - Monitor for signs/symptoms of CO2 retention  Outcome: Adequate for Discharge     Leakage noted from the old chest tube site. MD notified.  Cardiothoracic surgery APRN reinforced dressing on the site.  Plan for discharge back to home today. Discharge instruction given to patient and son to the best of my ability.  Script for outpatient cardiac monitoring given to patient.  Remote tele and PIV removed.   Patient will be going back to home with son.

## 2024-08-15 NOTE — CM/SW NOTE
08/15/24 1200   Discharge disposition   Expected discharge disposition Home-Health   Post Acute Care Provider [facility]   DME/Infusion Providers [facility]  (Home O2 - 2L)   Discharge transportation Private car     CM notified [facility] liaison Melita of patient discharge today - [facility] will be reaching out to patient to coordinate start of care.    Patient cleared for dc from CM/SW standpoint.    Krista Lorenz RN, BSN  Nurse   [phone]

## 2024-08-15 NOTE — PROGRESS NOTES
[facility] Cardiology Progress Note    Annmarie Levin Patient Status:  Inpatient    1945 MRN [id number]   Location [facility]5W Attending Chay Adams MD   Hosp Day # 10 PCP LLOYD ANDREWS MD     Subjective:  No CV concerns. Ready to go home     Objective:  /55 (BP Location: Left arm)   Pulse 70   Temp 98 °F (36.7 °C) (Oral)   Resp 18   Ht 157.5 cm (5' 2\")   Wt 184 lb 11.9 oz (83.8 kg)   LMP 10/18/1991 (Approximate)   SpO2 93%   BMI 33.79 kg/m²     Telemetry: NSR       Intake/Output:    Intake/Output Summary (Last 24 hours) at 8/15/2024 0957  Last data filed at 8/15/2024 0741  Gross per 24 hour   Intake 240 ml   Output 790 ml   Net -550 ml       Last 3 Weights   24 0630 184 lb 11.9 oz (83.8 kg)   24 0005 189 lb 6.4 oz (85.9 kg)   24 1904 186 lb (84.4 kg)   24 1102 186 lb (84.4 kg)   24 1148 191 lb (86.6 kg)       Labs:  Recent Labs   Lab 24  04524  0333 08/15/24  0540   * 112* 112*   BUN 12 14 13   CREATSERUM 0.74 0.92 0.67   EGFRCR 83 64 90   CA 8.8 9.1 8.9    138 139   K 4.4 5.0 4.6    102 104   CO2 32.0 34.0* 34.0*     Recent Labs   Lab 24  0450 24  0333 08/15/24  0540   RBC 3.18* 3.53* 3.17*   HGB 11.1* 11.9* 10.9*   HCT 32.4* 36.6 32.4*   .9* 103.7* 102.2*   MCH 34.9* 33.7 34.4*   MCHC 34.3 32.5 33.6   RDW 15.8* 15.9* 15.5*   NEPRELIM 6.82 7.01 4.28   WBC 8.9 9.8 6.5   .0 263.0 220.0         Recent Labs   Lab 24  0333 24  1038   TROPHS 9 31       Diagnostics:     24 Echo  1. Left ventricle: The cavity size was normal. Wall thickness was mildly      increased. Systolic function was mildly reduced. The estimated ejection      fraction was 50%, by visual assessment. No diagnostic evidence for      regional wall motion abnormalities. Unable to assess LV diastolic      function due to heart rhythm.   2. Right ventricle: Systolic pressure was at the upper limits of  normal.   3. Left atrium: The left atrial volume was normal.   4. Ascending aorta: The ascending aorta was mildly dilated.   5. Pulmonary arteries: Systolic pressure was at the upper limits of normal,      estimated to be 35mm Hg.   Impressions:  No previous study was available for comparison.     Review of Systems   Respiratory: Negative.     Cardiovascular: Negative.      Physical Exam:    General: Alert and oriented x 3. No apparent distress.   HEENT: Normocephalic, anicteric sclera, neck supple, no thyromegaly or adenopathy.  Neck: No JVD, carotids 2+, no bruits.  Cardiac: Regular rate & rhythm. S1, S2 normal. No murmur, pericardial rub, S3, or extra cardiac sounds.  Lungs: Clear without wheezes, rales, rhonchi or dullness.  Normal excursions and effort.  Abdomen: Soft, non-tender. No organosplenomegally, mass or rebound, BS-present.  Extremities: Without clubbing or cyanosis.  No left lower extremity edema, no right lower extremity edema.  Neurologic: Alert and oriented, normal affect. No focal defects  Skin: Warm and dry.       Medications:   metoprolol tartrate  25 mg Oral 2x Daily(Beta Blocker)    heparin  5,000 Units Subcutaneous 2 times per day    montelukast  10 mg Oral Nightly    pantoprazole  40 mg Oral QAM AC    fluticasone-salmeterol  1 puff Inhalation BID         Assessment:  78-year-old female history of COPD now with pneumothorax & new afib status post VATS procedure and resection.       Atrial fibrillation , new onset   - Likely 2/2 recent surgery. Presented w/ rapid rates    - Normal TSH. Preserved EF on echo , no wma or significant valve abn   - Off of cardizem gtt . On lopressor   - Remains in NSR. No need for AC at this point.     Pneumothorax   - Per Pulm & Thoracic Surgeon     Abn EKG   - Extensive ST depression noted while tachycardic   - Likely underlying CAD recommend outpatient ischemic evaluation after acute issues resolved     COPD   - Stable     NADJA   - Per Pulm     Plan:    Remains  in NSR. Switch lopressor to toprol xl 50mg daily . No need for AC at this time   Recommend 2 week MCT on discharge to assess for any recurrent afib   EKG w/ extensive ST depression while in afib rvr. Repeat EKG today w/ NSR & resolution of ST depression.  Trop negative x 2. Echo w/ preserved EF, no wma. Denies anginal sx. Can consider ischemic evaluation as OP   Ok to discharge from Cardiology standpoint.     AMBER Ibanez  8/15/2024  9:57 AM  Ph [phone] ([facility])  Ph [phone] ([place])

## 2024-08-16 ENCOUNTER — TELEPHONE (OUTPATIENT)
Dept: HEMATOLOGY/ONCOLOGY | Facility: HOSPITAL | Age: 79
End: 2024-08-16

## 2024-08-16 NOTE — TELEPHONE ENCOUNTER
Patient was transferred to [facility] at [phone]. Called 8/16/24 to cancel her appointment

## 2024-08-20 ENCOUNTER — HOSPITAL ENCOUNTER (OUTPATIENT)
Dept: GENERAL RADIOLOGY | Age: 79
Discharge: HOME | End: 2024-08-20
Attending: INTERNAL MEDICINE
Payer: MEDICARE

## 2024-08-20 DIAGNOSIS — J93.9 PNEUMOTHORAX, UNSPECIFIED TYPE: ICD-10-CM

## 2024-08-20 PROCEDURE — 71046 X-RAY EXAM CHEST 2 VIEWS: CPT | Performed by: INTERNAL MEDICINE

## 2024-08-22 ENCOUNTER — OFFICE VISIT (OUTPATIENT)
Dept: HEMATOLOGY/ONCOLOGY | Facility: HOSPITAL | Age: 79
End: 2024-08-22
Attending: THORACIC SURGERY (CARDIOTHORACIC VASCULAR SURGERY)
Payer: MEDICARE

## 2024-08-22 VITALS
BODY MASS INDEX: 35.3 KG/M2 | HEART RATE: 79 BPM | WEIGHT: 187 LBS | OXYGEN SATURATION: 93 % | RESPIRATION RATE: 18 BRPM | TEMPERATURE: 98 F | DIASTOLIC BLOOD PRESSURE: 42 MMHG | SYSTOLIC BLOOD PRESSURE: 113 MMHG | HEIGHT: 61 IN

## 2024-08-22 DIAGNOSIS — J98.59 MEDIASTINAL MASS: ICD-10-CM

## 2024-08-22 DIAGNOSIS — J93.9 PNEUMOTHORAX, UNSPECIFIED TYPE: Primary | ICD-10-CM

## 2024-08-22 NOTE — PROGRESS NOTES
Thoracic Surgery Post-Op Progress Note    Ms. Levin is a 78 year old female who presents today in follow up after a right VATS bleb resection and talc pleurodesis performed on 8/12/24 for persistent air leak. She is doing well. She complains of a productive cough that comes and goes. It is worse at night. She denies fevers or chills. No hemoptysis. Her breathing is improving. She is getting IS twice as high as she was before. She uses supplemental O2 as needed, and she had this before her hospitalization. No worsening pain, swelling, or drainage from wounds.     PMH:  Past Medical History:    Arrhythmia    hx of rapid heartbeat     Perales's esophagus    Perales's esophagus    path consistent; EGD 07/17/2007; Mohamed Sait    Breast cancer (Formerly [facility])    1992 - [id number];  Mastectomy/Chemo/Radiation.,right breast    Breast cancer of upper-outer quadrant of right female breast (Formerly [facility])    1992 - [id number];  Mastectomy/Chemo/Radiation     COPD (chronic obstructive pulmonary disease) (Formerly [facility])    Environmental allergies    Esophageal reflux    History of stomach ulcers    hx    Hyperlipidemia    Malignant neoplasm of upper-outer quadrant of right breast in female, estrogen receptor negative (Formerly [facility])    1992 - [id number];  Mastectomy/Chemo/Radiation     Mixed hyperlipidemia    Organic cardiac disease    Osteoarthritis    bilateral knee    Osteoarthritis of left knee    Synvisc-Mark Sheikh    Osteoarthritis of right foot    steroid injection of subtalar joint of the right foot under fluoroscopy guidance; Mark Johnson    Osteoarthritis of right knee    Synvisc-Mark Worthington    Osteopenia of multiple sites    PONV (postoperative nausea and vomiting)    Rosacea    Skin cancer    multiple carcinomas of the skin    Sleep apnea    CPAP 2012    Sleep apnea    CPAP titration; weight reduction, CPAP 15 cm of water, aboidance of respiratory depressants including alcohol and sedatives    Visual impairment       Meds:    Current  Outpatient Medications:     METOPROLOL SUCCINATE ER 50 MG Oral Tablet 24 Hr, Take 1 tablet (50 mg total) by mouth Daily Beta Blocker., Disp: 30 tablet, Rfl: 1    tiotropium 18 MCG Inhalation Cap, Inhale into the lungs daily., Disp: , Rfl:     GEMTESA 75 MG Oral Tab, Take 1 tablet by mouth daily., Disp: , Rfl:     fluticasone propionate 50 MCG/ACT Nasal Suspension, 2 sprays by Nasal route daily., Disp: , Rfl:     SYMBICORT 160-4.5 MCG/ACT Inhalation Aerosol, Inhale 2 puffs into the lungs 2 (two) times daily., Disp: , Rfl:     albuterol 108 (90 Base) MCG/ACT Inhalation Aero Soln, Inhale 2 puffs into the lungs every 4 (four) hours as needed., Disp: , Rfl:     PATIENT SUPPLIED MEDICATION, 2L of O2 as needed, Disp: , Rfl:     multivitamin Oral Tab, Take 1 tablet by mouth daily with breakfast., Disp: , Rfl:     estradiol (ESTRACE) 0.1 MG/GM Vaginal Cream, Apply 1/2 gram vaginally 2 times per week. Use at bedtime., Disp: 42.5 g, Rfl: 3    Cholecalciferol (VITAMIN D) 50 MCG (2000 UT) Oral Cap, Take 1 capsule (2,000 Units total) by mouth daily., Disp: , Rfl:     Vitamin B-12 (VITAMIN B12) 500 MCG Oral Tab, Take 1 tablet (500 mcg total) by mouth daily., Disp: , Rfl:     Multiple Vitamins-Minerals (MULTI FOR HER 50+) Oral Tab, Take  by mouth daily., Disp: , Rfl:     Vaginal Moisturizer (LUVENA PREBIOTIC LUBRICANT VA), Place  vaginally. Indications: One every four days, per pt's medication list, Disp: , Rfl:     Omeprazole 40 MG Oral Capsule Delayed Release, Take 1 capsule (40 mg total) by mouth daily., Disp: , Rfl:     aspirin 325 MG Oral Tab, Take  by mouth., Disp: , Rfl:     montelukast 10 MG Oral Tab, Take by mouth., Disp: , Rfl:     Calcium Carbonate-Vitamin D 600-200 MG-UNIT Oral Cap, Take  by mouth., Disp: , Rfl:     Vital Signs:    /42 (BP Location: Left arm, Patient Position: Sitting, Cuff Size: large)   Pulse 79   Temp 98.2 °F (36.8 °C) (Oral)   Resp 18   Ht 1.549 m (5' 1\")   Wt 84.8 kg (187 lb)   LMP  10/18/1991 (Approximate)   SpO2 93%   BMI 35.33 kg/m²     Physical Exam:    General: well appearing female in no acute distress  HEENT: Normocephalic, PERRL, EOMI  Heart: regular rate and rhythm. No murmurs, rubs or gallops. No lower extremity edema.  Lungs: Normal respiratory effort. Clear to ascultation bilaterally.  Chest: incisions are clean, dry and intact. No signs of infection. Chest tube stitch removed.  Abdomen: Soft, Non-tender, non-distended. No hepatosplenomegaly noted.  Extremities: No clubbing or cyanosis. No lateralizing weakness  Neuro: No gross cranial nerve defects, no loss of sensation  Psych: oriented to person place and time, normal mood and affect    Pathology:  Final Diagnosis:      A. Right pleural biopsy:  Fragments of cauterized mesothelial lined fibroconnective and fibroadipose pleural tissue   Scant fragments of bronchial epithelial cells with cautery artifact  No definitive malignancy identified      B. Right pleural  #2; biopsy:  Fragments of cauterized mesothelial lined fibroconnective and fibroadipose pleural tissue and focal skeletal muscle with mild chronic inflammation and mesothelial hyperplasia  No malignancy identified     C. Lung, right upper lobe; wedge excision:  Portion of lung parenchyma and overlying visceral pleura demonstrating a subpleural lung bleb (1,1 cm) with surrounding areas of subpleural fibrosis, associated mild chronic inflammatory infiltrates with congestion and areas of cystically dilated airspaces  No malignancy identified        CXR 8/20/24  Findings and impression:      Stable size small right apical pneumothorax now 50 percent filled with fluid      Stable right apical lung fibrosis      Normal heart size      Clear left lung     Assessment:    Ms. Levin is a 78 year old female who presents today in follow up after a right VATS bleb resection and talc pleurodesis performed on 8/12/24 for persistent air leak. Final pathology consistent with pulmonary  bleb, negative for malignancy. She is doing quite well. CXR reviewed. Ms. Levin has no apparent complications from the surgery. We will continue to follow her as needed in the postoperative period. 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 CT scan during her hospitalization reviewed and do not feel that further surveillance imaging is needed. She has no restriction and we have encouraged an active lifestyle.     Plan:  Encouraged continued exercise and incentive spirometer use.  No activity restrictions  Ms. Levin should follow up with thoracic surgery on an as needed basis. She is encouraged to call with any further questions or concerns.     Sherrie Carter PA-C  Thoracic Surgery

## 2024-08-28 ENCOUNTER — ORDER TRANSCRIPTION (OUTPATIENT)
Dept: CARDIAC REHAB | Facility: HOSPITAL | Age: 79
End: 2024-08-28

## 2024-08-28 DIAGNOSIS — J44.9 COPD (CHRONIC OBSTRUCTIVE PULMONARY DISEASE) (HCC): Primary | ICD-10-CM

## 2024-08-29 ENCOUNTER — TELEPHONE (OUTPATIENT)
Dept: UROLOGY | Facility: HOSPITAL | Age: 79
End: 2024-08-29

## 2024-08-29 DIAGNOSIS — N39.41 URGE URINARY INCONTINENCE: Primary | ICD-10-CM

## 2024-08-29 RX ORDER — VIBEGRON 75 MG/1
75 TABLET, FILM COATED ORAL DAILY
Qty: 30 TABLET | Refills: 11 | Status: SHIPPED | OUTPATIENT
Start: 2024-08-29 | End: 2024-09-28

## 2024-08-29 NOTE — TELEPHONE ENCOUNTER
TC from pt requesting refill for Gemtesa.  Last visit 8/2/24 with Jojo Rogers PA-C.  Pt to continue Gemtesa 75mg daily and f/u in 1 year.  TORB Jojo Rogers PA-C, Gemtesa 75mg po daily.  Rx sent to pt's preferred pharmacy.  Pt reports going to [place] for the winter.  Encouraged to call with any issues with prescription when in [place].  F/U as scheduled.  Pt understands and agrees to plan.

## 2024-09-12 ENCOUNTER — CARDPULM VISIT (OUTPATIENT)
Dept: CARDIAC REHAB | Facility: HOSPITAL | Age: 79
End: 2024-09-12
Attending: INTERNAL MEDICINE
Payer: MEDICARE

## 2024-09-12 DIAGNOSIS — J44.9 CHRONIC OBSTRUCTIVE PULMONARY DISEASE, UNSPECIFIED COPD TYPE (HCC): ICD-10-CM

## 2024-09-12 DIAGNOSIS — J44.9 COPD (CHRONIC OBSTRUCTIVE PULMONARY DISEASE) (HCC): Primary | ICD-10-CM

## 2024-09-24 ENCOUNTER — CARDPULM VISIT (OUTPATIENT)
Dept: CARDIAC REHAB | Facility: HOSPITAL | Age: 79
End: 2024-09-24
Attending: INTERNAL MEDICINE
Payer: MEDICARE

## 2024-09-24 PROCEDURE — 94625 PHY/QHP OP PULM RHB W/O MNTR: CPT

## 2024-09-26 ENCOUNTER — LAB ENCOUNTER (OUTPATIENT)
Dept: LAB | Age: 79
End: 2024-09-26
Attending: INTERNAL MEDICINE
Payer: MEDICARE

## 2024-09-26 ENCOUNTER — APPOINTMENT (OUTPATIENT)
Dept: HEMATOLOGY/ONCOLOGY | Facility: HOSPITAL | Age: 79
End: 2024-09-26
Attending: THORACIC SURGERY (CARDIOTHORACIC VASCULAR SURGERY)
Payer: MEDICARE

## 2024-09-26 DIAGNOSIS — E78.00 PURE HYPERCHOLESTEROLEMIA: Primary | ICD-10-CM

## 2024-09-26 DIAGNOSIS — E55.9 AVITAMINOSIS D: ICD-10-CM

## 2024-09-26 DIAGNOSIS — R35.89 POLYURIA: ICD-10-CM

## 2024-09-26 LAB
ALBUMIN SERPL-MCNC: 4 G/DL (ref 3.2–4.8)
ALBUMIN/GLOB SERPL: 1.4 {RATIO} (ref 1–2)
ALP LIVER SERPL-CCNC: 66 U/L
ALT SERPL-CCNC: 11 U/L
ANION GAP SERPL CALC-SCNC: 9 MMOL/L (ref 0–18)
AST SERPL-CCNC: 19 U/L (ref ?–34)
BASOPHILS # BLD AUTO: 0.03 X10(3) UL (ref 0–0.2)
BASOPHILS NFR BLD AUTO: 0.3 %
BILIRUB SERPL-MCNC: 1.6 MG/DL (ref 0.2–1.1)
BILIRUB UR QL: NEGATIVE
BUN BLD-MCNC: 9 MG/DL (ref 9–23)
BUN/CREAT SERPL: 13 (ref 10–20)
CALCIUM BLD-MCNC: 9.3 MG/DL (ref 8.7–10.4)
CHLORIDE SERPL-SCNC: 100 MMOL/L (ref 98–112)
CHOLEST SERPL-MCNC: 200 MG/DL (ref ?–200)
CLARITY UR: CLEAR
CO2 SERPL-SCNC: 26 MMOL/L (ref 21–32)
COLOR UR: YELLOW
CREAT BLD-MCNC: 0.69 MG/DL
DEPRECATED RDW RBC AUTO: 59 FL (ref 35.1–46.3)
EGFRCR SERPLBLD CKD-EPI 2021: 89 ML/MIN/1.73M2 (ref 60–?)
EOSINOPHIL # BLD AUTO: 0 X10(3) UL (ref 0–0.7)
EOSINOPHIL NFR BLD AUTO: 0 %
ERYTHROCYTE [DISTWIDTH] IN BLOOD BY AUTOMATED COUNT: 15.9 % (ref 11–15)
FASTING PATIENT LIPID ANSWER: YES
FASTING STATUS PATIENT QL REPORTED: YES
GLOBULIN PLAS-MCNC: 2.8 G/DL (ref 2–3.5)
GLUCOSE BLD-MCNC: 116 MG/DL (ref 70–99)
GLUCOSE UR-MCNC: NORMAL MG/DL
HCT VFR BLD AUTO: 34.2 %
HDLC SERPL-MCNC: 76 MG/DL (ref 40–59)
HGB BLD-MCNC: 11.3 G/DL
HGB UR QL STRIP.AUTO: NEGATIVE
IMM GRANULOCYTES # BLD AUTO: 0.05 X10(3) UL (ref 0–1)
IMM GRANULOCYTES NFR BLD: 0.4 %
KETONES UR-MCNC: 20 MG/DL
LDLC SERPL CALC-MCNC: 116 MG/DL (ref ?–100)
LEUKOCYTE ESTERASE UR QL STRIP.AUTO: NEGATIVE
LYMPHOCYTES # BLD AUTO: 0.87 X10(3) UL (ref 1–4)
LYMPHOCYTES NFR BLD AUTO: 7.5 %
MCH RBC QN AUTO: 33.9 PG (ref 26–34)
MCHC RBC AUTO-ENTMCNC: 33 G/DL (ref 31–37)
MCV RBC AUTO: 102.7 FL
MONOCYTES # BLD AUTO: 1.45 X10(3) UL (ref 0.1–1)
MONOCYTES NFR BLD AUTO: 12.5 %
NEUTROPHILS # BLD AUTO: 9.19 X10 (3) UL (ref 1.5–7.7)
NEUTROPHILS # BLD AUTO: 9.19 X10(3) UL (ref 1.5–7.7)
NEUTROPHILS NFR BLD AUTO: 79.3 %
NITRITE UR QL STRIP.AUTO: NEGATIVE
NONHDLC SERPL-MCNC: 124 MG/DL (ref ?–130)
OSMOLALITY SERPL CALC.SUM OF ELEC: 280 MOSM/KG (ref 275–295)
PH UR: 5.5 [PH] (ref 5–8)
PLATELET # BLD AUTO: 298 10(3)UL (ref 150–450)
POTASSIUM SERPL-SCNC: 4.2 MMOL/L (ref 3.5–5.1)
PROT SERPL-MCNC: 6.8 G/DL (ref 5.7–8.2)
PROT UR-MCNC: 20 MG/DL
RBC # BLD AUTO: 3.33 X10(6)UL
SODIUM SERPL-SCNC: 135 MMOL/L (ref 136–145)
SP GR UR STRIP: 1.02 (ref 1–1.03)
TRIGL SERPL-MCNC: 42 MG/DL (ref 30–149)
TSI SER-ACNC: 1.25 MIU/ML (ref 0.55–4.78)
UROBILINOGEN UR STRIP-ACNC: NORMAL
VIT D+METAB SERPL-MCNC: 48.3 NG/ML (ref 30–100)
VLDLC SERPL CALC-MCNC: 7 MG/DL (ref 0–30)
WBC # BLD AUTO: 11.6 X10(3) UL (ref 4–11)

## 2024-09-26 PROCEDURE — 80053 COMPREHEN METABOLIC PANEL: CPT

## 2024-09-26 PROCEDURE — 36415 COLL VENOUS BLD VENIPUNCTURE: CPT

## 2024-09-26 PROCEDURE — 84443 ASSAY THYROID STIM HORMONE: CPT

## 2024-09-26 PROCEDURE — 80061 LIPID PANEL: CPT

## 2024-09-26 PROCEDURE — 81001 URINALYSIS AUTO W/SCOPE: CPT

## 2024-09-26 PROCEDURE — 82306 VITAMIN D 25 HYDROXY: CPT

## 2024-09-26 PROCEDURE — 85025 COMPLETE CBC W/AUTO DIFF WBC: CPT

## 2024-10-01 ENCOUNTER — APPOINTMENT (OUTPATIENT)
Dept: CARDIAC REHAB | Facility: HOSPITAL | Age: 79
End: 2024-10-01
Attending: INTERNAL MEDICINE
Payer: MEDICARE

## 2024-10-03 ENCOUNTER — APPOINTMENT (OUTPATIENT)
Dept: CARDIAC REHAB | Facility: HOSPITAL | Age: 79
End: 2024-10-03
Attending: INTERNAL MEDICINE
Payer: MEDICARE

## 2024-10-05 ENCOUNTER — HOSPITAL ENCOUNTER (INPATIENT)
Facility: HOSPITAL | Age: 79
LOS: 4 days | Discharge: HOME | End: 2024-10-09
Attending: EMERGENCY MEDICINE | Admitting: HOSPITALIST
Payer: MEDICARE

## 2024-10-05 ENCOUNTER — APPOINTMENT (OUTPATIENT)
Dept: GENERAL RADIOLOGY | Facility: HOSPITAL | Age: 79
End: 2024-10-05
Attending: EMERGENCY MEDICINE
Payer: MEDICARE

## 2024-10-05 ENCOUNTER — APPOINTMENT (OUTPATIENT)
Dept: CT IMAGING | Facility: HOSPITAL | Age: 79
End: 2024-10-05
Attending: EMERGENCY MEDICINE
Payer: MEDICARE

## 2024-10-05 DIAGNOSIS — J90 PLEURAL EFFUSION: ICD-10-CM

## 2024-10-05 DIAGNOSIS — I48.91 ATRIAL FIBRILLATION WITH RVR (HCC): Primary | ICD-10-CM

## 2024-10-05 LAB
ALBUMIN SERPL-MCNC: 3.9 G/DL (ref 3.2–4.8)
ALP LIVER SERPL-CCNC: 223 U/L
ALT SERPL-CCNC: 57 U/L
ANION GAP SERPL CALC-SCNC: 6 MMOL/L (ref 0–18)
APTT PPP: 33.8 SECONDS (ref 23–36)
AST SERPL-CCNC: 79 U/L (ref ?–34)
BASOPHILS # BLD AUTO: 0.03 X10(3) UL (ref 0–0.2)
BASOPHILS NFR BLD AUTO: 0.3 %
BILIRUB DIRECT SERPL-MCNC: 0.3 MG/DL (ref ?–0.3)
BILIRUB SERPL-MCNC: 0.7 MG/DL (ref 0.2–1.1)
BILIRUB UR QL: NEGATIVE
BUN BLD-MCNC: 10 MG/DL (ref 9–23)
BUN/CREAT SERPL: 15.9 (ref 10–20)
CALCIUM BLD-MCNC: 9.1 MG/DL (ref 8.7–10.4)
CHLORIDE SERPL-SCNC: 100 MMOL/L (ref 98–112)
CLARITY UR: CLEAR
CO2 SERPL-SCNC: 28 MMOL/L (ref 21–32)
COLOR UR: COLORLESS
CREAT BLD-MCNC: 0.63 MG/DL
DEPRECATED RDW RBC AUTO: 55.3 FL (ref 35.1–46.3)
EGFRCR SERPLBLD CKD-EPI 2021: 91 ML/MIN/1.73M2 (ref 60–?)
EOSINOPHIL # BLD AUTO: 0.03 X10(3) UL (ref 0–0.7)
EOSINOPHIL NFR BLD AUTO: 0.3 %
ERYTHROCYTE [DISTWIDTH] IN BLOOD BY AUTOMATED COUNT: 15 % (ref 11–15)
GLUCOSE BLD-MCNC: 137 MG/DL (ref 70–99)
GLUCOSE UR-MCNC: NORMAL MG/DL
HCT VFR BLD AUTO: 30.3 %
HGB BLD-MCNC: 10 G/DL
HGB UR QL STRIP.AUTO: NEGATIVE
IMM GRANULOCYTES # BLD AUTO: 0.07 X10(3) UL (ref 0–1)
IMM GRANULOCYTES NFR BLD: 0.6 %
INR BLD: 1.08 (ref 0.8–1.2)
KETONES UR-MCNC: NEGATIVE MG/DL
LEUKOCYTE ESTERASE UR QL STRIP.AUTO: NEGATIVE
LYMPHOCYTES # BLD AUTO: 0.91 X10(3) UL (ref 1–4)
LYMPHOCYTES NFR BLD AUTO: 7.9 %
MCH RBC QN AUTO: 33.4 PG (ref 26–34)
MCHC RBC AUTO-ENTMCNC: 33 G/DL (ref 31–37)
MCV RBC AUTO: 101.3 FL
MONOCYTES # BLD AUTO: 1.12 X10(3) UL (ref 0.1–1)
MONOCYTES NFR BLD AUTO: 9.8 %
NEUTROPHILS # BLD AUTO: 9.3 X10 (3) UL (ref 1.5–7.7)
NEUTROPHILS # BLD AUTO: 9.3 X10(3) UL (ref 1.5–7.7)
NEUTROPHILS NFR BLD AUTO: 81.1 %
NITRITE UR QL STRIP.AUTO: NEGATIVE
NT-PROBNP SERPL-MCNC: 1296 PG/ML (ref ?–450)
OSMOLALITY SERPL CALC.SUM OF ELEC: 279 MOSM/KG (ref 275–295)
PH UR: 5 [PH] (ref 5–8)
PLATELET # BLD AUTO: 465 10(3)UL (ref 150–450)
POTASSIUM SERPL-SCNC: 3.8 MMOL/L (ref 3.5–5.1)
PROT SERPL-MCNC: 6.7 G/DL (ref 5.7–8.2)
PROT UR-MCNC: NEGATIVE MG/DL
PROTHROMBIN TIME: 14.7 SECONDS (ref 11.6–14.8)
Q-T INTERVAL: 262 MS
QRS DURATION: 88 MS
QTC CALCULATION (BEZET): 455 MS
R AXIS: -42 DEGREES
RBC # BLD AUTO: 2.99 X10(6)UL
SODIUM SERPL-SCNC: 134 MMOL/L (ref 136–145)
SP GR UR STRIP: 1.01 (ref 1–1.03)
T AXIS: 90 DEGREES
TROPONIN I SERPL HS-MCNC: 10 NG/L
UROBILINOGEN UR STRIP-ACNC: NORMAL
VENTRICULAR RATE: 182 BPM
WBC # BLD AUTO: 11.5 X10(3) UL (ref 4–11)

## 2024-10-05 PROCEDURE — 71045 X-RAY EXAM CHEST 1 VIEW: CPT | Performed by: EMERGENCY MEDICINE

## 2024-10-05 PROCEDURE — 71260 CT THORAX DX C+: CPT | Performed by: EMERGENCY MEDICINE

## 2024-10-05 PROCEDURE — 99223 1ST HOSP IP/OBS HIGH 75: CPT | Performed by: INTERNAL MEDICINE

## 2024-10-05 RX ORDER — ONDANSETRON 2 MG/ML
4 INJECTION INTRAMUSCULAR; INTRAVENOUS EVERY 6 HOURS PRN
Status: DISCONTINUED | OUTPATIENT
Start: 2024-10-05 | End: 2024-10-09

## 2024-10-05 RX ORDER — METOCLOPRAMIDE HYDROCHLORIDE 5 MG/ML
5 INJECTION INTRAMUSCULAR; INTRAVENOUS EVERY 8 HOURS PRN
Status: DISCONTINUED | OUTPATIENT
Start: 2024-10-05 | End: 2024-10-09

## 2024-10-05 RX ORDER — DILTIAZEM HYDROCHLORIDE 5 MG/ML
10 INJECTION INTRAVENOUS ONCE
Status: COMPLETED | OUTPATIENT
Start: 2024-10-05 | End: 2024-10-05

## 2024-10-05 RX ORDER — POLYETHYLENE GLYCOL 3350 17 G/17G
17 POWDER, FOR SOLUTION ORAL DAILY PRN
Status: DISCONTINUED | OUTPATIENT
Start: 2024-10-05 | End: 2024-10-09

## 2024-10-05 RX ORDER — HEPARIN SODIUM 1000 [USP'U]/ML
60 INJECTION, SOLUTION INTRAVENOUS; SUBCUTANEOUS ONCE
Status: COMPLETED | OUTPATIENT
Start: 2024-10-05 | End: 2024-10-05

## 2024-10-05 RX ORDER — HEPARIN SODIUM AND DEXTROSE 10000; 5 [USP'U]/100ML; G/100ML
12 INJECTION INTRAVENOUS ONCE
Status: COMPLETED | OUTPATIENT
Start: 2024-10-05 | End: 2024-10-05

## 2024-10-05 RX ORDER — MELATONIN
3 NIGHTLY PRN
Status: DISCONTINUED | OUTPATIENT
Start: 2024-10-05 | End: 2024-10-09

## 2024-10-05 RX ORDER — METOPROLOL TARTRATE 1 MG/ML
5 INJECTION, SOLUTION INTRAVENOUS ONCE
Status: COMPLETED | OUTPATIENT
Start: 2024-10-05 | End: 2024-10-05

## 2024-10-05 RX ORDER — BISACODYL 10 MG
10 SUPPOSITORY, RECTAL RECTAL
Status: DISCONTINUED | OUTPATIENT
Start: 2024-10-05 | End: 2024-10-09

## 2024-10-05 RX ORDER — FUROSEMIDE 10 MG/ML
20 INJECTION INTRAMUSCULAR; INTRAVENOUS ONCE
Status: COMPLETED | OUTPATIENT
Start: 2024-10-05 | End: 2024-10-05

## 2024-10-05 RX ORDER — BENZONATATE 100 MG/1
200 CAPSULE ORAL 3 TIMES DAILY PRN
Status: DISCONTINUED | OUTPATIENT
Start: 2024-10-05 | End: 2024-10-09

## 2024-10-05 RX ORDER — DILTIAZEM HYDROCHLORIDE 30 MG/1
30 TABLET, FILM COATED ORAL EVERY 6 HOURS SCHEDULED
Status: DISCONTINUED | OUTPATIENT
Start: 2024-10-06 | End: 2024-10-09

## 2024-10-05 RX ORDER — HEPARIN SODIUM AND DEXTROSE 10000; 5 [USP'U]/100ML; G/100ML
INJECTION INTRAVENOUS CONTINUOUS
Status: DISCONTINUED | OUTPATIENT
Start: 2024-10-05 | End: 2024-10-09

## 2024-10-05 RX ORDER — SENNOSIDES 8.6 MG
17.2 TABLET ORAL NIGHTLY PRN
Status: DISCONTINUED | OUTPATIENT
Start: 2024-10-05 | End: 2024-10-09

## 2024-10-05 RX ORDER — SODIUM PHOSPHATE, DIBASIC AND SODIUM PHOSPHATE, MONOBASIC 7; 19 G/230ML; G/230ML
1 ENEMA RECTAL ONCE AS NEEDED
Status: DISCONTINUED | OUTPATIENT
Start: 2024-10-05 | End: 2024-10-09

## 2024-10-05 RX ORDER — ACETAMINOPHEN 500 MG
500 TABLET ORAL EVERY 4 HOURS PRN
Status: DISCONTINUED | OUTPATIENT
Start: 2024-10-05 | End: 2024-10-08

## 2024-10-05 NOTE — ED QUICK NOTES
Orders for admission, patient is aware of plan and ready to go upstairs. Any questions, please call ED RN Karthik at extension [phone].     Patient Covid vaccination status: Fully vaccinated     COVID Test Ordered in ED: None    COVID Suspicion at Admission: N/A    Running Infusions:    dilTIAZem 20 mg/hr (10/05/24 1633)    continuous dose heparin          Mental Status/LOC at time of transport: a/o x 4    Other pertinent information:   CIWA score: N/A   NIH score:  N/A

## 2024-10-05 NOTE — ED QUICK NOTES
Heart rate still fluctuating between 120-150's despite being on maxed out cardizem. ER MD aware with no further orders.

## 2024-10-05 NOTE — HISTORICAL OFFICE NOTE
[facility]  Outside Information  Continuity of Care Document  9/23/2024  Annmarie Levin - 78 y.o. Female; born [birth year]Summary of episode note, generated on Oct. 05, 2024October 05, 2024   CHIEF COMPLAINT    CHIEF COMPLAINT  Reason for Visit/Chief Complaint   Follow up   78-year-old female she has a prior history of COPD, sleep apnea on CPAP, hyperlipidemia and breast cancer. She was found to have a pneumothorax and is status post VATS and bleb resection on 8/12/2024. Patient also had pleurodesis with talc. Dr. Sanabria is her pulmonologist. Cardiology was consulted for postop atrial fibrillation with rapid ventricular response that converted. EKG while she was in tachycardia was abnormal and plan was for an outpatient ischemic workup. She did have an echocardiogram with ejection fraction 50% and a mildly dilated ascending aorta. TSH was normal. She was discharged on metoprolol succinate 50 mg daily and aspirin 325 mg daily. Just completed 14-day MCT monitor.     PROBLEMS  Reconcile with Patient's ChartPROBLEMS  Problem Effective Dates Date resolved Problem Status   Acute pneumothorax, [SNOMED-CT: 21415625] 8/20/2024 - Active   History of hyperlipidemia, [SNOMED-CT: 375890613] 8/20/2024 - Active   Obstructive sleep apnea - NADJA, [SNOMED-CT: 11012853] 8/20/2024 - Active   COPD (chronic obstructive pulmonary disease), [SNOMED-CT: 38561954] 8/20/2024 - Active   Fatigue, [SNOMED-CT: 74778846] 11/4/2020 - Active     ENCOUNTER    ENCOUNTER  Problem Effective Dates Date resolved Problem Status   Atrial fibrillation (Afib), paroxysmal - A Fib, [SNOMED-CT: 346271886] 8/20/2024 - Active   Acute pneumothorax, [SNOMED-CT: 94235117] 8/20/2024 - Active   History of hyperlipidemia, [SNOMED-CT: 031643938] 8/20/2024 - Active   Abnormal EKG, [SNOMED-CT: 175235658] 8/20/2024 - Active   Obstructive sleep apnea - NADJA, [SNOMED-CT: 23154890] 8/20/2024 - Active   COPD (chronic obstructive  pulmonary disease), [SNOMED-CT: 33721043] 8/20/2024 - Active     VITAL SIGNS    VITAL SIGNS  Date / Time: 9/24/2024   BP Systolic 132 mmHg   BP Diastolic 70 mmHg   Height 62 inches   Weight 190 lbs   Pulse Rate 79 bpm   BSA (Body Surface Area) 2 cc/m2   BMI (Body Mass Index) 34.7 cc/m2   Blood Pressure 132 / 70 mmHg     PHYSICAL EXAMINATION    PHYSICAL EXAMINATION  Header Details   Constitutional 94%o2   Vitals Left Arm Sitting  / 70 mmHg, Pulse rate 79 bpm, Regular, Height in 5' 2\", BMI: 34.7, Weight in 189.6 lbs (or) 86 kgs, BSA : 1.98 cc/m²   General Appearance No Acute Distress, Obese   Head/Eyes/Ears/Nose/Mouth/Throat Sclera Clear, EOMI, PERRLA, No pallor   Neck Normal carotid pulsations, No carotid bruits, No JVD   Respiratory Lungs clear with normal breath sounds, Equal bilaterally   Cardiovascular Intact distal pulses, Regular rhythm. Normal rate present. Normal and normal S1 and S2  Carotid pulses are 3+ on the right side and 3+ on the left side.     Gastrointestinal Abdomen soft, Non-tender, Normoactive bowel sounds, No masses   Gait Normal gait   Strength and tone Normal muscle strength   Upper Extremities No cyanosis, No edema   Lower Extremities Pulses 2+ and equal bilaterally, No edema     ALLERGIES, ADVERSE REACTIONS, ALERTS  Reconcile with Patient's ChartALLERGIES, ADVERSE REACTIONS, ALERTS  Type Substance Reaction Severity Status   Allergies Levofloxacin, [RxNorm: 0235564] RASH Mild Active   Allergies Morphine, [RxNorm: 447469] NAUSEA ONLY Mild Active     MEDICATIONS ADMINISTERED DURING VISIT    No data available    MEDICATIONS  Reconcile with Patient's ChartMEDICATIONS  Medication Start Date Route/Frequency Status   albuterol 108 (90 Base) MCG/ACT Inhalation Aero Soln, [RxNorm: 0541675] 8/19/2024 Inhale 2 puffs into the lungs every 4 (four) hours as needed. Active   aspirin 325 MG Oral Tab, [RxNorm: 458099] 8/19/2024 Take by mouth. Active   azithromycin 250 MG Oral Tab, [RxNorm: 931437]  8/19/2024 Take 1 tablet (250 mg total) by mouth daily. Active   Calcium Carbonate-Vitamin D 600-200 MG-UNIT Oral Cap, [RxNorm: 0536873] 8/19/2024 Take by mouth. Active   Cholecalciferol (VITAMIN D) 50 MCG (2000 UT) Oral Cap, [RxNorm: 897150] 8/19/2024 Take 1 capsule (2,000 Units total) by mouth daily. Active   desonide 0.05 % External Cream, [RxNorm: 153726] 8/19/2024 APPLY TO SCALY AREAS ON FACE TWICE A DAY UNTIL CLEAR *NOT COVERED PER INSURANCE Active   estradiol (ESTRACE) 0.1 MG/GM Vaginal Cream, [RxNorm: 429715] 8/19/2024 Apply 1/2 gram vaginally 2 times per week. Use at bedtime. Active   fluticasone propionate 50 MCG/ACT Nasal Suspension, [RxNorm: 2783602] 8/19/2024 2 sprays by Nasal route daily. Active   GEMTESA 75 MG Oral Tab, [RxNorm: 8537467] 8/19/2024 Take 1 tablet by mouth daily. Active   metoprolol succinate ER 50 mg tablet,extended release 24 hr, [RxNorm: 384971] 9/6/2024 Take 1 tablet orally once a day. Active   montelukast 10 MG Oral Tab, [RxNorm: 353710] 8/19/2024 Take by mouth. Active   Multiple Vitamins-Minerals (MULTI FOR HER 50+) Oral Tab, [RxNorm: 0] 8/19/2024 Take by mouth daily. Active   Omeprazole 40 MG Oral Capsule Delayed Release, [RxNorm: 411566] 8/19/2024 Take 1 capsule (40 mg total) by mouth daily. Active   SYMBICORT 160-4.5 MCG/ACT Inhalation Aerosol, [RxNorm: 2091991] 8/19/2024 Inhale 2 puffs into the lungs 2 (two) times daily. Active   tiotropium 18 MCG Inhalation Cap, [RxNorm: 183029] 8/19/2024 Inhale into the lungs daily. Active   Vitamin B-12 (VITAMIN B12) 500 MCG Oral Tab, [RxNorm: 441271] 8/19/2024 Take 1 tablet (500 mcg total) by mouth daily. Active     ASSESSMENT    1. Atrial fibrillation-episode of atrial fibrillation with rapid ventricular response as discussed above. Patient's had no tachycardia since discharge. MCT monitor shows no evidence of recurrence of A-fib no longer on Eliquis. Continue metoprolol to take in the evening.  2. Abnormal EKG-ischemic changes on hospital  EKG when the patient was tachycardic. Unable to exercise schedule cardiac PET scan.  3. COPD/pneumothorax-follows with Dr. Sanabria and is doing well on current therapy including oxygen with exertion.  4. Sleep apnea-has used CPAP for some time currently is off of it after her pneumothorax. She will discuss with her thoracic surgeon if she can resume it. Plan:  Cardiac PET scan next available if normal follow-up 6 months     FAMILY HISTORY    FAMILY HISTORY  Relationship Age Diagnosis   Father 0 Family history of heart disease   Mother 0 No history of Family history of heart disease     GENERAL STATUS    No data available    PAST MEDICAL HISTORY    PAST MEDICAL HISTORY  Problem Date diagonsed Date resolved Status   Acute pneumothorax, [SNOMED-CT: 39292644] 8/20/2024 - Active   History of hyperlipidemia, [SNOMED-CT: 652475956] 8/20/2024 - Active   Obstructive sleep apnea - NADJA, [SNOMED-CT: 47761531] 8/20/2024 - Active   COPD (chronic obstructive pulmonary disease), [SNOMED-CT: 64962740] 8/20/2024 - Active   Fatigue, [SNOMED-CT: 20351713] 11/4/2020 - Active     HISTORY OF PRESENT ILLNESS    78-year-old female she has a prior history of COPD, sleep apnea on CPAP, hyperlipidemia and breast cancer. She was found to have a pneumothorax and is status post VATS and bleb resection on 8/12/2024. Patient also had pleurodesis with talc. Dr. Sanabria is her pulmonologist. Cardiology was consulted for postop atrial fibrillation with rapid ventricular response that converted. EKG while she was in tachycardia was abnormal and plan was for an outpatient ischemic workup. She did have an echocardiogram with ejection fraction 50% and a mildly dilated ascending aorta. TSH was normal. She was discharged on metoprolol succinate 50 mg daily and aspirin 325 mg daily. Just completed 14-day MCT monitor.     IMMUNIZATIONS  Reconcile with Patient's ChartNo data available    PLAN OF CARE    PLAN OF CARE  Planned Care Date   Cardiac PET/CT Scan  (83555) 1/1/1900   Follow up visit - Gatito Hatch MD 1/1/1900     PROCEDURES    No data available    RESULTS    RESULTS  Name Result Date Location - Ordered By   Ambulatory Telemetry Monitor 1.This is an excellent quality study. 2.Symptom diary was submitted by the patient. 3.Predominant rhythm is normal sinus rhythm. 4.The minimum heart rate recorded was 53 beats / minute. The maximum heart rate is 117 beats / minute. The mean heart rate is 72 beats / minute. 5.Rare premature atrial contractions noted. 6.AFIB Louise: <1%7.Rare premature ventricular contractions noted. 8.No evidence of ventricular tachycardia is noted.9.No evidence of supraventricular arrhythmia is noted.10.No evidence of ventricular arrhythmia is noted.11.No pauses were noted. 12.Telemetry Tech: Tracy Guerra 8/27/2024 2:30:00 PM Gatito Hatch MD     REVIEW OF SYSTEMS    REVIEW OF SYSTEMS  Header Details   Constitutional No history of Anorexia   Eyes No history of Blurry vision, Visual changes, Double vision   Gastrointestinal No history of Abdominal pain, Vomiting   Cardiovascular LIN  No history of Chest pain, Palpitations, Syncope, PND, Orthopnea, Edema, Claudication   Musculoskeletal Arthritis   Respiratory SOB  No history of Wheezing, Sputum   Neurological No history of Migraines   Hem/Lymphatic No history of Easy bruising, Blood clots, Hx of blood transfusion, Anemia, Bleeding problems     SOCIAL HISTORY    SOCIAL HISTORY  Social History Element Description Effective Dates   Smoking status Never smoked -     FUNCTIONAL STATUS    No data available    MEDICAL EQUIPMENT    No data available    Goals Sections    No data available    REASON FOR REFERRAL    No data available    Health Concerns Section    No data available    COGNITIVE/MENTAL STATUS    No data available    Patient Demographics    Patient Demographics  Patient Address Patient Name Communication   [address]  [place] [address] Annmarie Levin [phone] (Mobile)      Patient Demographics  Language Race / Ethnicity Marital Status   English - Spoken (Preferred) Unknown / Unknown      Document Information    Primary Care Provider Other Service Providers Document Coverage Dates   Gatito Hatch  NPI: [id number]  [phone] (Work)  [address], [address]  [place] [address]  [place] [address]  Interpreting Physicians  [facility]  [phone] (Work)  [address] [place] [address] Becky Gates  NPI: [id number]  [phone] (Work)  [address], [address] [place] [address]  [place] [address]  Nurses     Kalina Bacon  NPI: [id number]  [phone] (Work)  [address], [address] [place] [address]  [place] [address]  Nurses     Alisa Dietrich  NPI: [id number]  [phone] (Work)  [address], [address] [place] [address]  [place] [address]  Nurses Sep. 24, 2024September 24, 2024      Organization   [facility]  [phone] (Work)  [address] [place] [address], [address] [place] [address]  [place] [address]     Encounter Providers Encounter Date    Sep. 24, 2024September 24, 2024     Legal Authenticator    Gatito Hatch  NPI: [id number]  [phone] (Work)  [address], [address] [place] [address]  [place] [address]

## 2024-10-05 NOTE — ED INITIAL ASSESSMENT (HPI)
Pt w/ walker through triage c/o elevated HR. Pt states it started ~1300 today. Increased O2 demand up to 3L, baseline 2.  in triage

## 2024-10-05 NOTE — HISTORICAL OFFICE NOTE
[facility]  Consultation    Annmarie Levin Patient Status:  Emergency    1945 MRN [id number]   Location [facility] EMERGENCY DEPARTMENT Attending Corbin Leroy MD   Hosp Day # 0 PCP LLOYD ANDREWS MD     Consults    Reason for Consultation:  Atrial fibrillation    History of Present Illness:  Annmarie Levin is a a(n) 78 year old female with history of COPD, sleep apnea uses CPAP, HLD and breast cancer, recent pneumothorax and s/p VATS and belb resection on 2024, post op afib during that admission.    Pt stated to be not feeling well for the past week and haft with fever, night sweats, and cough. She was started on zpack by her primary and just completed yesterday.Today around 1pm she felt like the heart was racing. She checked her HR with pulse oxymetry at home and noted to be in 170's. Denies any chest pain or SOB.     History:  Past Medical History:    Arrhythmia    hx of rapid heartbeat     Perales's esophagus    Perales's esophagus    path consistent; EGD 2007; Mohamed Sait    Breast cancer (HCC)     - [id number];  Mastectomy/Chemo/Radiation.,right breast    Breast cancer of upper-outer quadrant of right female breast ([facility])     - [id number];  Mastectomy/Chemo/Radiation     COPD (chronic obstructive pulmonary disease) ([facility])    Environmental allergies    Esophageal reflux    History of stomach ulcers    hx    Hyperlipidemia    Malignant neoplasm of upper-outer quadrant of right breast in female, estrogen receptor negative (HCC)     - [id number];  Mastectomy/Chemo/Radiation     Mixed hyperlipidemia    Organic cardiac disease    Osteoarthritis    bilateral knee    Osteoarthritis of left knee    Synvisc-Mark Sheikh    Osteoarthritis of right foot    steroid injection of subtalar joint of the right foot under fluoroscopy guidance; Mark Johnson    Osteoarthritis of right knee    Mark Alva    Osteopenia of multiple sites    Pneumothorax  on right    s/p VATS bleb resection and pleurodesis    PONV (postoperative nausea and vomiting)    Rosacea    Skin cancer    multiple carcinomas of the skin    Sleep apnea    CPAP 2012    Sleep apnea    CPAP titration; weight reduction, CPAP 15 cm of water, aboidance of respiratory depressants including alcohol and sedatives    Visual impairment     Past Surgical History:   Procedure Laterality Date    Carpal tunnel release      Chemotherapy Right 1992    Cholecystectomy      Colonoscopy  05/25/2005    Perales's/constipation; EGD/colonoscopy;diverticulosis, internal hemorrhoids, gastritis, hiatal hernia, Perales's esophagus - continue PPI therapy     Colonoscopy  06/28/2011    change in bowel habits / Perales's; EGD colonoscopy biopsy; diverticula sigmoid colon, internal hemorrhoids, mild gastritis, histal hernia with reflux, mild esophagitis, no evidence of Perales's /  Arpit Ellison     Colonoscopy      Fracture surgery      General sleep study  05/09/2012    obesity, hypersomnolence snoring; sleep study; obstructive sleep apnea, favorable response to CPAP 15 cm of water / Yoseph Everett f/u 6/6/12    Angelito localization wire 1 site left (cpt=19281) Left 1993    pre ca excisional bx    Mastectomy right Right     Other surgical history      Radiation right Right 1992    Thoracoscopy surg w pleurodesis Right 08/12/2024    VATS bleb resection and pleurodesis by Dr. Orona    Upper gi endoscopy,exam  2002    Perales's esophagus; EGD 12/10/2002; Arpit Ellison     Family History   Problem Relation Age of Onset    Heart Disease Other         grandfather    Stroke Other         aunt    Breast Cancer Sister 78        approx    Breast Cancer Self 47        approx    Breast Cancer Paternal Cousin Female 78        approx    Heart Disorder Father     Diabetes Mother       reports that she has quit smoking. She has quit using smokeless tobacco. She reports current alcohol use of about 2.5 standard drinks of alcohol per week. She  reports that she does not use drugs.    Allergies:  Allergies   Allergen Reactions    Sulfa Antibiotics FEVER    Adhesive Tape     Levaquin [Levofloxacin] RASH    Morphine NAUSEA ONLY     Other reaction(s): \"doesn't agree with me\"       Medications:    Current Facility-Administered Medications:     dilTIAZem (cardIZEM) 100 mg in sodium chloride 0.9% 100 mL IVPB-ADDV, 10 mg/hr, Intravenous, Continuous    Review of Systems:    Constitutional:  Positive for diaphoresis and fever.   Respiratory:  Positive for cough.    Cardiovascular:  Positive for palpitations.         OBJECTIVE  Blood pressure 122/76, pulse (!) 133, temperature 99.1 °F (37.3 °C), temperature source Oral, resp. rate 24, height 5' 2\" (1.575 m), weight 183 lb (83 kg), last menstrual period 10/18/1991, SpO2 95%.  Temp (24hrs), Av.1 °F (37.3 °C), Min:99.1 °F (37.3 °C), Max:99.1 °F (37.3 °C)    Wt Readings from Last 3 Encounters:   10/05/24 183 lb (83 kg)   24 187 lb (84.8 kg)   24 184 lb 11.9 oz (83.8 kg)       Telemetry: atrial fibrillation    Physical Exam:  Physical Exam  Vitals reviewed.   Constitutional:       General: She is not in acute distress.     Appearance: She is not ill-appearing.   Neck:      Vascular: No JVD.   Cardiovascular:      Rate and Rhythm: Tachycardia present. Rhythm irregularly irregular.      Heart sounds: S1 normal and S2 normal. Heart sounds not distant. No murmur heard.     No friction rub. No gallop.   Pulmonary:      Effort: Pulmonary effort is normal.      Breath sounds: Examination of the right-lower field reveals decreased breath sounds. Decreased breath sounds present.   Musculoskeletal:      Cervical back: Normal range of motion.      Right lower leg: No edema.      Left lower leg: No edema.   Neurological:      Mental Status: She is alert.              Diagnostics History:  EKG: Atrial fibrillation  Echo: 24  Conclusions:     1. Left ventricle: The cavity size was normal. Wall thickness was mildly       increased. Systolic function was mildly reduced. The estimated ejection      fraction was 50%, by visual assessment. No diagnostic evidence for      regional wall motion abnormalities. Unable to assess LV diastolic      function due to heart rhythm.   2. Right ventricle: Systolic pressure was at the upper limits of normal.   3. Left atrium: The left atrial volume was normal.   4. Ascending aorta: The ascending aorta was mildly dilated.   5. Pulmonary arteries: Systolic pressure was at the upper limits of normal,      estimated to be 35mm Hg.   Impressions:  No previous study was available for comparison.   *     CTA Chest: pending   CXR: pending           Results:   No results for input(s): \"GLU\", \"BUN\", \"CREATSERUM\", \"GFRAA\", \"GFRNAA\", \"EGFRCR\", \"CA\", \"NA\", \"K\", \"CL\", \"CO2\" in the last 168 hours.  Recent Labs   Lab 10/05/24  1500   RBC 2.99*   HGB 10.0*   HCT 30.3*   .3*   MCH 33.4   MCHC 33.0   RDW 15.0   NEPRELIM 9.30*   WBC 11.5*   .0*         No results for input(s): \"BNP\" in the last 168 hours.  Lab Results   Component Value Date    INR 1.08 10/05/2024     No results found for: \"TROP\"      No results found.       Assessment and Plan    Atrial fibrillation with RVR  - likely secondary to acute illness  - pt was started on Cardizem gtt, with HR still in 140's. Metoprolol 5mg xIVpush given with some rate improvement. if not controlled with cardizem may need to consider digoxin.Amiodarone may not be choice at this time with elevated LFT's   - [id number] score of 3  - will start her on heparin gtt for stroke prevention  - MCT monitor as OP with less than 1% of afib burden  - echo from August with LVEF 50%, No RWMA  - tsh 1.24 in September 26th  - check UA  - check mag level   - pro BNP 1296  - trop x1 negative    Fever, cough   - On 2L O2 chronically, but now on 3L   - chest xray with persistent right pleural effusion  - CT  chest pending to r/o PE    COPD  - mgt per primary    NADJA  - on  CPAP        Resmi AUTUMN Girard  [facility]  10/5/2024  3:29 PM      Addendum'  Pt with pulmonary edema, will give 20mg of IV lasix x1 now.   HR still in 120's per RN, will give another dose of metoprolol 5mg x1

## 2024-10-05 NOTE — ED PROVIDER NOTES
Patient Seen in: [facility] Emergency Department      History     Chief Complaint   Patient presents with    Arrythmia/Palpitations            Stated Complaint: afib    Subjective:   78 year old female, history significant for COPD and breast cancer s/p/mastectomy/radiation presents with a complaint of acute onset rapid palpitations while in bed at 1 PM which is 2 hours ago.  She felt a little short of breath earlier.  She has been wearing her oxygen all week for breathing issues.  She has had cold symptoms and 10 days ago she had a fever.  She just finished a Z-Jose yesterday.  She has chronic cough.  No chest pain.  No dizziness.  No nausea vomiting or diarrhea.  No leg swelling.  No focal weakness or numbness.  She did have A-fib when in the hospital in August when she was admitted for pneumothorax and required pleurodesis.            Objective:     No pertinent past medical history.            No pertinent past surgical history.              No pertinent social history.                Physical Exam     ED Triage Vitals [10/05/24 1439]   /81   Pulse (!) 174   Resp 22   Temp 99.1 °F (37.3 °C)   Temp src Oral   SpO2 94 %   O2 Device Nasal cannula       Current Vitals:   Vital Signs  BP: 129/73  Pulse: 114  Resp: (!) 32  Temp: 99.1 °F (37.3 °C)  Temp src: Oral  MAP (mmHg): 89    Oxygen Therapy  SpO2: 94 %  O2 Device: Nasal cannula  O2 Flow Rate (L/min): 3 L/min        Physical Exam  HENT:      Head: Normocephalic and atraumatic.      Right Ear: External ear normal.      Left Ear: External ear normal.      Nose: Nose normal.      Mouth/Throat:      Mouth: Mucous membranes are moist.   Eyes:      Extraocular Movements: Extraocular movements intact.      Pupils: Pupils are equal, round, and reactive to light.   Cardiovascular:      Rate and Rhythm: Tachycardia present. Rhythm irregular.      Pulses: Normal pulses.      Heart sounds: Normal heart sounds.   Pulmonary:      Comments: Diminished breath sounds  right.  Slight tachypnea, speaks in full sentences  Abdominal:      Palpations: Abdomen is soft.      Tenderness: There is no abdominal tenderness.   Musculoskeletal:         General: Normal range of motion.      Cervical back: Normal range of motion and neck supple.      Right lower leg: No edema.      Left lower leg: No edema.   Skin:     General: Skin is warm.      Capillary Refill: Capillary refill takes less than 2 seconds.   Neurological:      Mental Status: She is alert.      Sensory: No sensory deficit.      Motor: No weakness.             ED Course     Labs Reviewed   CBC WITH DIFFERENTIAL WITH PLATELET - Abnormal; Notable for the following components:       Result Value    WBC 11.5 (*)     RBC 2.99 (*)     HGB 10.0 (*)     HCT 30.3 (*)     .3 (*)     RDW-SD 55.3 (*)     .0 (*)     Neutrophil Absolute Prelim 9.30 (*)     Neutrophil Absolute 9.30 (*)     Lymphocyte Absolute 0.91 (*)     Monocyte Absolute 1.12 (*)     All other components within normal limits   HEPATIC FUNCTION PANEL (7) - Abnormal; Notable for the following components:    AST 79 (*)     ALT 57 (*)     Alkaline Phosphatase 223 (*)     All other components within normal limits   BASIC METABOLIC PANEL (8) - Abnormal; Notable for the following components:    Glucose 137 (*)     Sodium 134 (*)     All other components within normal limits   PRO BETA NATRIURETIC PEPTIDE - Abnormal; Notable for the following components:    Pro-Beta Natriuretic Peptide 1,296 (*)     All other components within normal limits   TROPONIN I HIGH SENSITIVITY - Normal   PROTHROMBIN TIME (PT) - Normal   PTT, ACTIVATED - Normal   URINALYSIS, ROUTINE   RAINBOW DRAW LAVENDER   RAINBOW DRAW LIGHT GREEN   RAINBOW DRAW BLUE   RAINBOW DRAW GOLD     EKG    Rate, intervals and axes as noted on EKG Report.  Rate: 182  Rhythm: Atrial Fibrillation  Reading: A-fib with RVR, left axis deviation, LVH, nonspecific ST findings.  EKG from August 15 showed sinus but the 1 just  before that the day before showed A-fib RVR which looks similar to today's EKG.  This is abnormal read by myself.           ED Course as of 10/05/24 1827  ------------------------------------------------------------  Time: 10/05 1825  Comment: Labs independently interpreted by me.  Slight transaminitis.  proBNP slightly elevated 1296 could be related to the uncontrolled heart rate.  CBC showed white count of 11.5 hemoglobin 10.0 slightly lower when compared to prior.  Cardiology saw the patient.  Decision was made to heparinize.  ------------------------------------------------------------  Time: 10/05 1826  Comment: Chest x-ray showing some pulmonary edema.               MDM      CT CHEST PE AORTA (IV ONLY) (CPT=71260)    Result Date: 10/5/2024  CONCLUSION:   No evidence of pulmonary embolism to the proximal large bilateral segmental arterial level.  Small right apical pneumothorax with fluid.  Small right pleural effusion which may be partially loculated.  Patchy airspace opacity in the right middle and lower lobes may reflect atelectasis with or without superimposed pneumonia.  Lesser incidental findings as above.    Dictated by (CST): Levon Lee MD on 10/05/2024 at 5:23 PM     Finalized by (CST): Levon Lee MD on 10/05/2024 at 5:34 PM          XR CHEST AP PORTABLE  (CPT=71045)    Result Date: 10/5/2024  CONCLUSION:   Worsening pulmonary edema compared to the prior exam.  Persistent right pleural effusion.  Air-fluid level in the right apical bulla has resolved.    Dictated by (CST): Wagner Matthews MD on 10/05/2024 at 4:20 PM     Finalized by (CST): Wagner Matthews MD on 10/05/2024 at 4:21 PM                         Admission disposition: 10/5/2024  5:37 PM           Medical Decision Making  Patient here with palpitations and with A-fib and RVR.  She has had A-fib before.  Certainly other causes such as infection, dehydration, anemia, PE, hyperthyroidism and many other causes are in the differential.   She said her cough breathing is at baseline but certainly infectious process needs to be considered and she had recently finished a Z-Jose.  She has no chest pain but ACS is in the differential.  Will start by giving IV Cardizem and started drip.  Will discuss with cardiology as well as her pulmonology group.    Amount and/or Complexity of Data Reviewed  External Data Reviewed: notes.     Details: I reviewed the discharge summary why patient was not started on anticoagulation.  Labs: ordered. Decision-making details documented in ED Course.  Radiology: ordered and independent interpretation performed. Decision-making details documented in ED Course.  ECG/medicine tests: ordered and independent interpretation performed. Decision-making details documented in ED Course.  Discussion of management or test interpretation with external provider(s): Patient's rate did improve but was still tachycardic with her Cardizem drip maxed.  She received some labetalol as well.  She was seen by cardiology.  CT did show some pleural effusion and opacities and small pneumothorax.  This was discussed with her pulmonologist Dr. Goldsmith who will be on consult.  No indication for antibiotics now.  Afebrile.  She has been on her oxygen all week.    Risk  Decision regarding hospitalization.  Risk Details: COPD, A-fib      I spent a total of 35 minutes of critical care time in obtaining history, performing a physical exam, bedside monitoring of interventions, collecting and interpreting tests and discussion with consultants but not including time spent performing procedures.   Disposition and Plan     Clinical Impression:  1. Atrial fibrillation with RVR (HCC)    2. Pleural effusion         Disposition:  Admit  10/5/2024  5:37 pm    Follow-up:  No follow-up provider specified.  We recommend that you schedule follow up care with a primary care provider within the next three months to obtain basic health screening including reassessment of  your blood pressure.      Medications Prescribed:  Current Discharge Medication List              Supplementary Documentation:         Hospital Problems       Present on Admission  Date Reviewed: 9/9/2024            ICD-10-CM Noted POA    * (Principal) Atrial fibrillation with RVR (HCC) I48.91 10/5/2024 Unknown

## 2024-10-05 NOTE — H&P
[facility]  part of [facility]    History and Physical     Annmarie Levin Patient Status:  Emergency    1945 MRN [id number]   Location [facility] EMERGENCY DEPARTMENT Attending Corbin Leroy MD   Hosp Day # 0 PCP LLOYD ANDREWS MD     History of Present Illness: Annmarie Levin is a 78 year old female with a past medical history of COPD, breast cancer s/p mastectomy/radiation and recent admission for COPD exacerbation and pneumothorax presented to the ER with complaints of SOB and palpitations that began earlier this afternoon. The patient stated she was previously diagnosed with afib and stated she felt the same way at that time.   Of note, the patient was recently diagnosed with possible PNA and was started on a zpak, the course of which she completed yesterday.   She denied any other complaints at the time of interview.     Past Medical History:  Past Medical History:    Arrhythmia    hx of rapid heartbeat     Perales's esophagus    Perales's esophagus    path consistent; EGD 2007; Mohamed Sait    Breast cancer ([facility])     - [id number];  Mastectomy/Chemo/Radiation.,right breast    Breast cancer of upper-outer quadrant of right female breast ([facility])     - [id number];  Mastectomy/Chemo/Radiation     COPD (chronic obstructive pulmonary disease) ([facility])    Environmental allergies    Esophageal reflux    History of stomach ulcers    hx    Hyperlipidemia    Malignant neoplasm of upper-outer quadrant of right breast in female, estrogen receptor negative ([facility])     - [id number];  Mastectomy/Chemo/Radiation     Mixed hyperlipidemia    Organic cardiac disease    Osteoarthritis    bilateral knee    Osteoarthritis of left knee    Synvisc-Mark Sheikh    Osteoarthritis of right foot    steroid injection of subtalar joint of the right foot under fluoroscopy guidance; Mark Johnson    Osteoarthritis of right knee    Mark Alva    Osteopenia of  multiple sites    Pneumothorax on right    s/p VATS bleb resection and pleurodesis    PONV (postoperative nausea and vomiting)    Rosacea    Skin cancer    multiple carcinomas of the skin    Sleep apnea    CPAP 2012    Sleep apnea    CPAP titration; weight reduction, CPAP 15 cm of water, aboidance of respiratory depressants including alcohol and sedatives    Visual impairment        Past Surgical History:   Past Surgical History:   Procedure Laterality Date    Carpal tunnel release      Chemotherapy Right 1992    Cholecystectomy      Colonoscopy  05/25/2005    Perales's/constipation; EGD/colonoscopy;diverticulosis, internal hemorrhoids, gastritis, hiatal hernia, Perales's esophagus - continue PPI therapy     Colonoscopy  06/28/2011    change in bowel habits / Perales's; EGD colonoscopy biopsy; diverticula sigmoid colon, internal hemorrhoids, mild gastritis, histal hernia with reflux, mild esophagitis, no evidence of Perales's /  Arpit Ellison     Colonoscopy      Fracture surgery      General sleep study  05/09/2012    obesity, hypersomnolence snoring; sleep study; obstructive sleep apnea, favorable response to CPAP 15 cm of water / Yoseph Everett f/u 6/6/12    Angelito localization wire 1 site left (cpt=19281) Left 1993    pre ca excisional bx    Mastectomy right Right     Other surgical history      Radiation right Right 1992    Thoracoscopy surg w pleurodesis Right 08/12/2024    VATS bleb resection and pleurodesis by Dr. Orona    Upper gi endoscopy,exam  2002    Perales's esophagus; EGD 12/10/2002; Arpit Ellison       Social History:  reports that she has quit smoking. She has quit using smokeless tobacco. She reports current alcohol use of about 2.5 standard drinks of alcohol per week. She reports that she does not use drugs.    Family History:   Family History   Problem Relation Age of Onset    Heart Disease Other         grandfather    Stroke Other         aunt    Breast Cancer Sister 78        approx    Breast  Cancer Self 47        approx    Breast Cancer Paternal Cousin Female 78        approx    Heart Disorder Father     Diabetes Mother        Allergies:   Allergies   Allergen Reactions    Sulfa Antibiotics FEVER    Adhesive Tape     Levaquin [Levofloxacin] RASH    Morphine NAUSEA ONLY     Other reaction(s): \"doesn't agree with me\"       Medications:    Current Facility-Administered Medications on File Prior to Encounter   Medication Dose Route Frequency Provider Last Rate Last Admin    [COMPLETED] magnesium oxide (Mag-Ox) tab 400 mg  400 mg Oral Once Chay Adams MD   400 mg at 08/15/24 0825    [COMPLETED] dilTIAZem (cardIZEM) 25 mg/5mL injection             [COMPLETED] dilTIAZem (cardIZEM) 25 mg/5mL injection 10 mg  10 mg Intravenous Once Caro Rome MD   10 mg at 08/14/24 0130    [COMPLETED] ceFAZolin (Ancef) 2g in 10mL IV syringe premix  2 g Intravenous Q8H Sherrie Carter PA-C 120 mL/hr at 08/13/24 0441 2 g at 08/13/24 0441    [COMPLETED] ceFAZolin (Ancef) 2g in 10mL IV syringe premix  2 g Intravenous Q8H Brian Orona Jr., MD   2 g at 08/12/24 1210    [COMPLETED] sodium chloride 0.9 % IV bolus 500 mL  500 mL Intravenous Once Tootie Ye  mL/hr at 08/10/24 2327 500 mL at 08/10/24 2327    [COMPLETED] midazolam (Versed) 2 MG/2ML injection             [COMPLETED] fentaNYL (Sublimaze) 50 mcg/mL injection             [COMPLETED] lidocaine (Xylocaine) 2 % injection             [COMPLETED] iopamidol 76% (ISOVUE-370) injection for power injector  65 mL Intravenous ONCE PRN Chay Adams MD   65 mL at 08/08/24 1223    [COMPLETED] midazolam (Versed) 2 MG/2ML injection             [COMPLETED] fentaNYL (Sublimaze) 50 mcg/mL injection             [COMPLETED] lidocaine PF (Xylocaine-MPF) 2 % injection             [COMPLETED] magnesium oxide (Mag-Ox) tab 400 mg  400 mg Oral Once Carlos Dominguez MD   400 mg at 08/06/24 0847    [COMPLETED] predniSONE (Deltasone) tab 40 mg  40 mg Oral  Daily with breakfast Darren Campos MD   40 mg at 24 0858    [] ketorolac (Toradol) 15 MG/ML injection 15 mg  15 mg Intravenous Q6H PRN Argelia Grossman MD   15 mg at 24 0126    [COMPLETED] albuterol (Ventolin) (5 MG/ML) 0.5% nebulizer solution 10 mg  10 mg Nebulization Once Mark Mcgregor MD   10 mg at 24 2015    [COMPLETED] HYDROcodone-acetaminophen (Norco) 5-325 MG per tab 1 tablet  1 tablet Oral Once Mark Mcgregor MD   1 tablet at 24 5841     Current Outpatient Medications on File Prior to Encounter   Medication Sig Dispense Refill    [] Vibegron (GEMTESA) 75 MG Oral Tab Take 75 mg by mouth daily. 30 tablet 11    METOPROLOL SUCCINATE ER 50 MG Oral Tablet 24 Hr Take 1 tablet (50 mg total) by mouth Daily Beta Blocker. 30 tablet 1    tiotropium 18 MCG Inhalation Cap Inhale into the lungs daily.      GEMTESA 75 MG Oral Tab Take 1 tablet by mouth daily.      fluticasone propionate 50 MCG/ACT Nasal Suspension 2 sprays by Nasal route daily.      SYMBICORT 160-4.5 MCG/ACT Inhalation Aerosol Inhale 2 puffs into the lungs 2 (two) times daily.      albuterol 108 (90 Base) MCG/ACT Inhalation Aero Soln Inhale 2 puffs into the lungs every 4 (four) hours as needed.      PATIENT SUPPLIED MEDICATION 2L of O2 as needed      multivitamin Oral Tab Take 1 tablet by mouth daily with breakfast.      estradiol (ESTRACE) 0.1 MG/GM Vaginal Cream Apply 1/2 gram vaginally 2 times per week. Use at bedtime. 42.5 g 3    Cholecalciferol (VITAMIN D) 50 MCG (2000 UT) Oral Cap Take 1 capsule (2,000 Units total) by mouth daily.      Vitamin B-12 (VITAMIN B12) 500 MCG Oral Tab Take 1 tablet (500 mcg total) by mouth daily.      Multiple Vitamins-Minerals (MULTI FOR HER 50+) Oral Tab Take  by mouth daily.      Vaginal Moisturizer (LUVENA PREBIOTIC LUBRICANT VA) Place  vaginally. Indications: One every four days, per pt's medication list      Omeprazole 40 MG Oral Capsule Delayed Release Take 1 capsule (40  mg total) by mouth daily.      aspirin 325 MG Oral Tab Take  by mouth.      montelukast 10 MG Oral Tab Take by mouth.      Calcium Carbonate-Vitamin D 600-200 MG-UNIT Oral Cap Take  by mouth.         Review of Systems:   A comprehensive 14 point review of systems was completed.    Pertinent positives and negatives noted in the HPI.    Physical Exam:    /90   Pulse 120   Temp 99.1 °F (37.3 °C) (Oral)   Resp 23   Ht 5' 2\" (1.575 m)   Wt 184 lb 4.9 oz (83.6 kg)   LMP 10/18/1991 (Approximate)   SpO2 93%   BMI 33.71 kg/m²   General: No acute distress. Alert and oriented x 3.  Respiratory: Clear to auscultation bilaterally. No wheezes. No rhonchi.  Cardiovascular: S1, S2. Regular rate and rhythm. No murmurs, no rubs or gallops. Equal pulses.   Abdomen: Soft, nontender, nondistended.  Positive bowel sounds. No rebound, guarding or organomegaly.  Neurologic: No focal neurological deficits. CNII-XII grossly intact.  Extremities: No edema or cyanosis.      Diagnostic Data:      Labs:  Recent Labs   Lab 10/05/24  1500   WBC 11.5*   HGB 10.0*   .3*   .0*   INR 1.08       Recent Labs   Lab 10/05/24  1500   *   BUN 10   CREATSERUM 0.63   CA 9.1   ALB 3.9   *   K 3.8      CO2 28.0   ALKPHO 223*   AST 79*   ALT 57*   BILT 0.7   TP 6.7       Estimated Creatinine Clearance: 59.1 mL/min (based on SCr of 0.63 mg/dL).    Recent Labs   Lab 10/05/24  1500   PTP 14.7   INR 1.08       No results for input(s): \"TROP\", \"CK\" in the last 168 hours.    Imaging: Imaging data reviewed in Epic.      ASSESSMENT / PLAN:     SOB likely 2/2 worsening pulmonary edema  Afib with RVR  Persistent R pleural effusion  CXR reviewed  CT chest pending  pBNP elevated  Recently completed course of Z-percy  No wheezing on exam, hold off on steroids at this time  [id number]  Cardiology on consult  Started on Cardizem drip  Heparin drip for AC  UA pending  Repeat TSH pending  Tele monitoring  Monitor pulse ox  Chronic  respiratory failure  Hx of COPD  Continue home inhalers  Saturating well on 3L NC, baseline 2L  Monitor pulse ox, supplement as indicated  Hold off on steroids at this time  Obesity with NADJA  BMI 33  Counseled on making healthy lifestyle and dietary changes      Quality:  DVT Prophylaxis: Heparin drip  CODE status: Full    Plan of care discussed with ED physician    Chantel Cain MD  10/5/2024                 **Certification      PHYSICIAN Certification of Need for Inpatient Hospitalization - Initial Certification    Patient will require inpatient services that will reasonably be expected to span two midnight's based on the clinical documentation in H+P.   Based on patients current state of illness, I anticipate that, after discharge, patient will require TBD.        The 21st Century Cures Act makes medical notes like these available to patients in the interest of transparency. Please be advised this is a medical document. Medical documents are intended to carry relevant information, facts as evident, and the clinical opinion of the practitioner. The medical note is intended as peer to peer communication and may appear blunt or direct. It is written in medical language and may contain abbreviations or verbiage that are unfamiliar.

## 2024-10-06 LAB
ANION GAP SERPL CALC-SCNC: 6 MMOL/L (ref 0–18)
APTT PPP: 37.5 SECONDS (ref 23–36)
APTT PPP: 46 SECONDS (ref 23–36)
APTT PPP: 54.5 SECONDS (ref 23–36)
BASOPHILS # BLD AUTO: 0.04 X10(3) UL (ref 0–0.2)
BASOPHILS NFR BLD AUTO: 0.4 %
BUN BLD-MCNC: 8 MG/DL (ref 9–23)
BUN/CREAT SERPL: 13.1 (ref 10–20)
CALCIUM BLD-MCNC: 8.8 MG/DL (ref 8.7–10.4)
CHLORIDE SERPL-SCNC: 101 MMOL/L (ref 98–112)
CO2 SERPL-SCNC: 30 MMOL/L (ref 21–32)
CREAT BLD-MCNC: 0.61 MG/DL
DEPRECATED RDW RBC AUTO: 53.4 FL (ref 35.1–46.3)
EGFRCR SERPLBLD CKD-EPI 2021: 92 ML/MIN/1.73M2 (ref 60–?)
EOSINOPHIL # BLD AUTO: 0.06 X10(3) UL (ref 0–0.7)
EOSINOPHIL NFR BLD AUTO: 0.5 %
ERYTHROCYTE [DISTWIDTH] IN BLOOD BY AUTOMATED COUNT: 14.8 % (ref 11–15)
EST. AVERAGE GLUCOSE BLD GHB EST-MCNC: 123 MG/DL (ref 68–126)
GLUCOSE BLD-MCNC: 135 MG/DL (ref 70–99)
HBA1C MFR BLD: 5.9 % (ref ?–5.7)
HCT VFR BLD AUTO: 28.3 %
HGB BLD-MCNC: 9.4 G/DL
IMM GRANULOCYTES # BLD AUTO: 0.1 X10(3) UL (ref 0–1)
IMM GRANULOCYTES NFR BLD: 0.9 %
LYMPHOCYTES # BLD AUTO: 0.94 X10(3) UL (ref 1–4)
LYMPHOCYTES NFR BLD AUTO: 8.5 %
MAGNESIUM SERPL-MCNC: 1.5 MG/DL (ref 1.6–2.6)
MCH RBC QN AUTO: 33.2 PG (ref 26–34)
MCHC RBC AUTO-ENTMCNC: 33.2 G/DL (ref 31–37)
MCV RBC AUTO: 100 FL
MONOCYTES # BLD AUTO: 1.31 X10(3) UL (ref 0.1–1)
MONOCYTES NFR BLD AUTO: 11.8 %
NEUTROPHILS # BLD AUTO: 8.67 X10 (3) UL (ref 1.5–7.7)
NEUTROPHILS # BLD AUTO: 8.67 X10(3) UL (ref 1.5–7.7)
NEUTROPHILS NFR BLD AUTO: 77.9 %
OSMOLALITY SERPL CALC.SUM OF ELEC: 284 MOSM/KG (ref 275–295)
PLATELET # BLD AUTO: 437 10(3)UL (ref 150–450)
POTASSIUM SERPL-SCNC: 3.7 MMOL/L (ref 3.5–5.1)
PROCALCITONIN SERPL-MCNC: 0.23 NG/ML (ref ?–0.05)
RBC # BLD AUTO: 2.83 X10(6)UL
SODIUM SERPL-SCNC: 137 MMOL/L (ref 136–145)
WBC # BLD AUTO: 11.1 X10(3) UL (ref 4–11)

## 2024-10-06 PROCEDURE — 99233 SBSQ HOSP IP/OBS HIGH 50: CPT | Performed by: HOSPITALIST

## 2024-10-06 RX ORDER — HEPARIN SODIUM 1000 [USP'U]/ML
30 INJECTION, SOLUTION INTRAVENOUS; SUBCUTANEOUS ONCE
Status: COMPLETED | OUTPATIENT
Start: 2024-10-06 | End: 2024-10-06

## 2024-10-06 RX ORDER — AZITHROMYCIN 250 MG/1
500 TABLET, FILM COATED ORAL ONCE
Status: COMPLETED | OUTPATIENT
Start: 2024-10-06 | End: 2024-10-06

## 2024-10-06 RX ORDER — HYDROXYZINE HYDROCHLORIDE 25 MG/1
25 TABLET, FILM COATED ORAL 3 TIMES DAILY PRN
Status: DISCONTINUED | OUTPATIENT
Start: 2024-10-06 | End: 2024-10-09

## 2024-10-06 RX ORDER — SODIUM CHLORIDE 9 MG/ML
INJECTION, SOLUTION INTRAVENOUS
Status: ACTIVE | OUTPATIENT
Start: 2024-10-07 | End: 2024-10-07

## 2024-10-06 RX ORDER — FLUTICASONE PROPIONATE AND SALMETEROL 250; 50 UG/1; UG/1
1 POWDER RESPIRATORY (INHALATION) 2 TIMES DAILY
Status: DISCONTINUED | OUTPATIENT
Start: 2024-10-06 | End: 2024-10-09

## 2024-10-06 RX ORDER — MAGNESIUM OXIDE 400 MG/1
800 TABLET ORAL ONCE
Status: COMPLETED | OUTPATIENT
Start: 2024-10-06 | End: 2024-10-06

## 2024-10-06 RX ORDER — SODIUM CHLORIDE 0.9 % (FLUSH) 0.9 %
10 SYRINGE (ML) INJECTION AS NEEDED
Status: DISCONTINUED | OUTPATIENT
Start: 2024-10-07 | End: 2024-10-09

## 2024-10-06 RX ORDER — METOPROLOL TARTRATE 25 MG/1
25 TABLET, FILM COATED ORAL 4 TIMES DAILY
Status: DISCONTINUED | OUTPATIENT
Start: 2024-10-06 | End: 2024-10-08

## 2024-10-06 RX ORDER — DIGOXIN 0.25 MG/ML
250 INJECTION INTRAMUSCULAR; INTRAVENOUS ONCE
Status: COMPLETED | OUTPATIENT
Start: 2024-10-06 | End: 2024-10-06

## 2024-10-06 RX ORDER — ALPRAZOLAM 0.25 MG
0.25 TABLET ORAL ONCE
Status: DISCONTINUED | OUTPATIENT
Start: 2024-10-06 | End: 2024-10-06

## 2024-10-06 NOTE — CONSULTS
[facility]    Annmarie Levin Patient Status:  Inpatient    1945 MRN [id number]   Location 307/307-A PCP LLOYD ANDREWS MD     Date of Admission: 10/5/2024    History of Present Illness: Pt is a 78 year old female consulted for possible hyrdroptx and h/o hydroptx, tx history of chronic respiratory failure, COPD and NADJA on CPAP, afib, R sided PTX. Pt had presented with non resolving R sided PTX and underwent R sided VATS with talc pleurdesis 2024.  Since then noted feeling Ok with occassional chest pain or pulling on right side. She had developed URI sx with muscle aches and cough. Tested COVID negative multiple times. Seen by PCP and treated with doxy which caused stomach upset and then azithro. Pt noted yesterday to have a fast heart rate up to 176 on her pulse ox and prompted evaluation in ED. During w/u noted to have apical possible PTX vs bleb and on CT with small loculated effusion.   Otherwise pt denies cough, chest pain, nausea, vomitting or diarrhea.     PMH:    Past Medical History:    Arrhythmia    hx of rapid heartbeat     Perales's esophagus    Perales's esophagus    path consistent; EGD 2007; Mohamed Sait    Breast cancer ([facility])     - [id number];  Mastectomy/Chemo/Radiation.,right breast    Breast cancer of upper-outer quadrant of right female breast (Formerly [facility])     - [id number];  Mastectomy/Chemo/Radiation     COPD (chronic obstructive pulmonary disease) ([facility]ly [facility])    Environmental allergies    Esophageal reflux    History of stomach ulcers    hx    Hyperlipidemia    Malignant neoplasm of upper-outer quadrant of right breast in female, estrogen receptor negative ([facility])     - [id number];  Mastectomy/Chemo/Radiation     Mixed hyperlipidemia    Organic cardiac disease    Osteoarthritis    bilateral knee    Osteoarthritis of left knee    Synvisc-One injection, Mark Johnson    Osteoarthritis of right foot    steroid injection of subtalar joint of the right foot under  fluoroscopy guidance; Mark Johnson    Osteoarthritis of right knee    Synvisc-Mark Worthington    Osteopenia of multiple sites    Pneumothorax on right    s/p VATS bleb resection and pleurodesis    PONV (postoperative nausea and vomiting)    Rosacea    Skin cancer    multiple carcinomas of the skin    Sleep apnea    CPAP 2012    Sleep apnea    CPAP titration; weight reduction, CPAP 15 cm of water, aboidance of respiratory depressants including alcohol and sedatives    Visual impairment       Past Surgical History:   Procedure Laterality Date    Carpal tunnel release      Chemotherapy Right 1992    Cholecystectomy      Colonoscopy  05/25/2005    Perales's/constipation; EGD/colonoscopy;diverticulosis, internal hemorrhoids, gastritis, hiatal hernia, Perales's esophagus - continue PPI therapy     Colonoscopy  06/28/2011    change in bowel habits / Perales's; EGD colonoscopy biopsy; diverticula sigmoid colon, internal hemorrhoids, mild gastritis, histal hernia with reflux, mild esophagitis, no evidence of Perales's /  Arpit Ellison     Colonoscopy      Fracture surgery      General sleep study  05/09/2012    obesity, hypersomnolence snoring; sleep study; obstructive sleep apnea, favorable response to CPAP 15 cm of water / Yoseph Everett f/u 6/6/12    Angelito localization wire 1 site left (cpt=19281) Left 1993    pre ca excisional bx    Mastectomy right Right     Other surgical history      Radiation right Right 1992    Thoracoscopy surg w pleurodesis Right 08/12/2024    VATS bleb resection and pleurodesis by Dr. Orona    Upper gi endoscopy,exam  2002    Perales's esophagus; EGD 12/10/2002; Arpit Ellison       Allergies:   Allergies   Allergen Reactions    Sulfa Antibiotics FEVER    Adhesive Tape     Levaquin [Levofloxacin] RASH    Morphine NAUSEA ONLY     Other reaction(s): \"doesn't agree with me\"       Medications:  Outpatient Medications:    Current Facility-Administered Medications on File Prior to Encounter   Medication  Dose Route Frequency Provider Last Rate Last Admin    [COMPLETED] magnesium oxide (Mag-Ox) tab 400 mg  400 mg Oral Once Chay Adams MD   400 mg at 08/15/24 0825    [COMPLETED] dilTIAZem (cardIZEM) 25 mg/5mL injection             [COMPLETED] dilTIAZem (cardIZEM) 25 mg/5mL injection 10 mg  10 mg Intravenous Once Caro Rome MD   10 mg at 24 0130    [COMPLETED] ceFAZolin (Ancef) 2g in 10mL IV syringe premix  2 g Intravenous Q8H Sherrie Carter PA-C 120 mL/hr at 24 0441 2 g at 24 0441    [COMPLETED] ceFAZolin (Ancef) 2g in 10mL IV syringe premix  2 g Intravenous Q8H Brian Orona Jr., MD   2 g at 24 1210    [COMPLETED] sodium chloride 0.9 % IV bolus 500 mL  500 mL Intravenous Once Tootie Ye  mL/hr at 08/10/24 2327 500 mL at 08/10/24 2327    [COMPLETED] midazolam (Versed) 2 MG/2ML injection             [COMPLETED] fentaNYL (Sublimaze) 50 mcg/mL injection             [COMPLETED] lidocaine (Xylocaine) 2 % injection             [COMPLETED] iopamidol 76% (ISOVUE-370) injection for power injector  65 mL Intravenous ONCE PRN Chay Adams MD   65 mL at 24 1223    [COMPLETED] midazolam (Versed) 2 MG/2ML injection             [COMPLETED] fentaNYL (Sublimaze) 50 mcg/mL injection             [COMPLETED] lidocaine PF (Xylocaine-MPF) 2 % injection             [COMPLETED] magnesium oxide (Mag-Ox) tab 400 mg  400 mg Oral Once Carlos Dominguez MD   400 mg at 24 0847    [COMPLETED] predniSONE (Deltasone) tab 40 mg  40 mg Oral Daily with breakfast Darren Campos MD   40 mg at 24 0858    [] ketorolac (Toradol) 15 MG/ML injection 15 mg  15 mg Intravenous Q6H PRN Argelia Grossman MD   15 mg at 24 0126    [COMPLETED] albuterol (Ventolin) (5 MG/ML) 0.5% nebulizer solution 10 mg  10 mg Nebulization Once Mark Mcgregor MD   10 mg at 24    [COMPLETED] HYDROcodone-acetaminophen (Norco) 5-325 MG per tab 1 tablet  1 tablet Oral  Once BalbirMark MD   1 tablet at 24 1561     Current Outpatient Medications on File Prior to Encounter   Medication Sig Dispense Refill    [] Vibegron (GEMTESA) 75 MG Oral Tab Take 75 mg by mouth daily. 30 tablet 11    METOPROLOL SUCCINATE ER 50 MG Oral Tablet 24 Hr Take 1 tablet (50 mg total) by mouth Daily Beta Blocker. 30 tablet 1    tiotropium 18 MCG Inhalation Cap Inhale into the lungs daily.      GEMTESA 75 MG Oral Tab Take 1 tablet by mouth daily.      fluticasone propionate 50 MCG/ACT Nasal Suspension 2 sprays by Nasal route daily.      SYMBICORT 160-4.5 MCG/ACT Inhalation Aerosol Inhale 2 puffs into the lungs 2 (two) times daily.      albuterol 108 (90 Base) MCG/ACT Inhalation Aero Soln Inhale 2 puffs into the lungs every 4 (four) hours as needed.      PATIENT SUPPLIED MEDICATION 2L of O2 as needed      multivitamin Oral Tab Take 1 tablet by mouth daily with breakfast.      estradiol (ESTRACE) 0.1 MG/GM Vaginal Cream Apply 1/2 gram vaginally 2 times per week. Use at bedtime. 42.5 g 3    Cholecalciferol (VITAMIN D) 50 MCG (2000 UT) Oral Cap Take 1 capsule (2,000 Units total) by mouth daily.      Vitamin B-12 (VITAMIN B12) 500 MCG Oral Tab Take 1 tablet (500 mcg total) by mouth daily.      Multiple Vitamins-Minerals (MULTI FOR HER 50+) Oral Tab Take  by mouth daily.      Vaginal Moisturizer (LUVENA PREBIOTIC LUBRICANT VA) Place  vaginally. Indications: One every four days, per pt's medication list      Omeprazole 40 MG Oral Capsule Delayed Release Take 1 capsule (40 mg total) by mouth daily.      aspirin 325 MG Oral Tab Take  by mouth.      montelukast 10 MG Oral Tab Take by mouth.      Calcium Carbonate-Vitamin D 600-200 MG-UNIT Oral Cap Take  by mouth.         Inpatient Medications:  Scheduled Medications:     fluticasone-salmeterol  1 puff Inhalation BID    metoprolol tartrate  25 mg Oral 4x daily    dilTIAZem  30 mg Oral 4 times per day     Infusing Medications:     continuous dose  heparin 1,300 Units/hr (10/06/24 0923)    dilTIAZem 20 mg/hr (10/06/24 0920)     PRN Medications:    hydrOXYzine    melatonin    acetaminophen    benzonatate    ondansetron    metoclopramide    polyethylene glycol (PEG 3350)    sennosides    bisacodyl    fleet enema    dilTIAZem    Social History:    History   Smoking Status    Former   Smokeless Tobacco    Former       History   Alcohol Use    2.5 standard drinks of alcohol/week    3 Standard drinks or equivalent per week     Comment: 3 drinks daily       History   Drug Use No     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    TB: none  Asbestos: none clearly but  was  and  from mesothelioma. She did clean his work clothes.       Family History   Problem Relation Age of Onset    Heart Disease Other         grandfather    Stroke Other         aunt    Breast Cancer Sister 78        approx    Breast Cancer Self 47        approx    Breast Cancer Paternal Cousin Female 78        approx    Heart Disorder Father     Diabetes Mother       REVIEW OF SYSTEMS:   A comprehensive 10 point review of systems was completed.  Pertinent positives and negatives noted in the the HPI.    OBJECTIVE:  Temp (24hrs), Av.3 °F (36.8 °C), Min:97.8 °F (36.6 °C), Max:99.1 °F (37.3 °C)   /44 (BP Location: Left arm)   Pulse (!) 135   Temp 98.5 °F (36.9 °C) (Oral)   Resp 22   Ht 152.4 cm (5')   Wt 182 lb 3.2 oz (82.6 kg)   LMP 10/18/1991 (Approximate)   SpO2 96%   BMI 35.58 kg/m²   O2: 4 LNC  General: NAD.   Neuro: Alert, no focal deficits.    HEENT: PERRL  Neck : No LAD  CV: RRR, nl S1, S2, no S4, S3 or murmur.   Lungs: Coarse bilaterally.   Abd: Nontender, non distended.   Ext: No edema.   Skin: No rashes.     Labs:  Recent Labs   Lab 10/05/24  1500 10/06/24  0810   WBC 11.5* 11.1*   HGB 10.0* 9.4*   HCT 30.3* 28.3*   .0* 437.0     Recent Labs   Lab 10/05/24  1500 10/06/24  0810   * 135*   BUN 10 8*   CREATSERUM 0.63 0.61   CA 9.1 8.8   * 137   K  3.8 3.7    101   CO2 28.0 30.0   AST 79*  --    ALT 57*  --    ALB 3.9  --      Recent Labs   Lab 10/05/24  1500 10/06/24  0013 10/06/24  0810   INR 1.08  --   --    PTT 33.8 37.5* 46.0*     No results for input(s): \"ABGPHT\", \"MQNWIU5F\", \"TNYBV8O\", \"ABGHCO3\", \"SITE\", \"DEV\", \"THGB\" in the last 168 hours.  Recent Labs   Lab 10/05/24  1500   TROPHS 10     No results for input(s): \"BNP\" in the last 168 hours.  COVID-19 Lab Results    COVID-19  Lab Results   Component Value Date    COVID19 Not Detected 10/14/2022       Pro-Calcitonin  Recent Labs   Lab 10/06/24  0810   PCT 0.23*       Cardiac  Recent Labs   Lab 10/05/24  1500   PBNP 1,296*       Creatinine Kinase  No results for input(s): \"CK\" in the last 168 hours.    Inflammatory Markers  No results for input(s): \"CRP\", \"SONIA\", \"LDH\", \"DDIMER\" in the last 168 hours.      Imaging:  CXR 10/5:  Worsening pulmonary edema compared to the prior exam.  Persistent right pleural effusion.      Air-fluid level in the right apical bulla has resolved.     10/5      8/20      CT Chest 10/5:  No evidence of pulmonary embolism to the proximal large bilateral segmental arterial level.      Small right apical pneumothorax with fluid.      Small right pleural effusion which may be partially loculated.  Patchy airspace opacity in the right middle and lower lobes may reflect atelectasis with or without superimposed pneumonia.     My read: favor apical PTX is actually bulla.         Chest images personally reviewed.     Assessment/Plan   R sided apical bulla vs ptx and small basilar effusion.   Apical bulla or ptx actually does not have fluid like before. I think it is acutally a bulla that likely expanded post op   Overall expected post op changes from prior R sided VATS plerodesis. Pt has already had definitive procedure for unresolved PTX and I would not expect recurrence.   Overall improved radiographically.   NADJA  OK to use CPAP. Findings are c/w post pleurodesis findings.    Resume at home settings and use pt mask or auto 4-16 if not available.   AFib  Per cardiology following.   Cardizem gtt and heparin.   ID: s/p URI   Completed abx with azithro.   Monitor off abx.   Proph  Anticoagulated.     My best regards,         Robert Leal MD  [facility] Pulmonary, Critical Care and Sleep Medicine

## 2024-10-06 NOTE — PLAN OF CARE
Problem: PAIN - ADULT  Goal: Verbalizes/displays adequate comfort level or patient's stated pain goal  Description: INTERVENTIONS:  - Encourage pt to monitor pain and request assistance  - Assess pain using appropriate pain scale  - Administer analgesics based on type and severity of pain and evaluate response  - Implement non-pharmacological measures as appropriate and evaluate response  - Consider cultural and social influences on pain and pain management  - Manage/alleviate anxiety  - Utilize distraction and/or relaxation techniques  - Monitor for opioid side effects  - Notify MD/LIP if interventions unsuccessful or patient reports new pain  - Anticipate increased pain with activity and pre-medicate as appropriate  Outcome: Not Progressing     Problem: RISK FOR INFECTION - ADULT  Goal: Absence of fever/infection during anticipated neutropenic period  Description: INTERVENTIONS  - Monitor WBC  - Administer growth factors as ordered  - Implement neutropenic guidelines  Outcome: Not Progressing

## 2024-10-06 NOTE — CONSULTS
Cardiology Consult Note    Annmarie Levin Patient Status:  Inpatient    1945 MRN [id number]   Location [facility] 3W/SW Attending Felicia Mendez MD   Hosp Day # 1 PCP LLOYD ANDREWS MD     Annmarie Levin is a a(n) 78 year old female with history of COPD, sleep apnea uses CPAP, HLD and breast cancer, recent pneumothorax and s/p VATS and belb resection on 2024, post op afib during that admission.     Pt stated to be not feeling well for the past week and haft with fever, night sweats, and cough. She was started on zpack by her primary and just completed yesterday.Today around 1pm she felt like the heart was racing. She checked her HR with pulse oxymetry at home and noted to be in 170's. Denies any chest pain or SOB.       Assessment:    A-fib with RVR: Secondary to pneumonia, difficult to control rates  Cough and fever, concern for pneumonia  COPD, NADJA      Plan:  Diltiazem drip protocol  Consider digoxin  Avoid amiodarone given elevated LFTs  Start heparin  Check UA, mag, follow-up CT chest      Level of care: C3    Wayne May MD  Interventional Cardiology  [facility] [place]    --------------------------------------------------------------------------------------------------------------------------------  ROS 10 systems reviewed, pertinent findings above.  ROS    History:  Past Medical History:    Arrhythmia    hx of rapid heartbeat     Perales's esophagus    Perales's esophagus    path consistent; EGD 2007; Arpit Ellison    Breast cancer (HCC)     - [id number];  Mastectomy/Chemo/Radiation.,right breast    Breast cancer of upper-outer quadrant of right female breast (HCC)     - [id number];  Mastectomy/Chemo/Radiation     COPD (chronic obstructive pulmonary disease) (HCC)    Environmental allergies    Esophageal reflux    History of stomach ulcers    hx    Hyperlipidemia    Malignant neoplasm of upper-outer quadrant of right breast in female, estrogen receptor  negative (HCC)    1992 - [id number];  Mastectomy/Chemo/Radiation     Mixed hyperlipidemia    Organic cardiac disease    Osteoarthritis    bilateral knee    Osteoarthritis of left knee    Synvisc-Ander sinha, Mark Johnson    Osteoarthritis of right foot    steroid injection of subtalar joint of the right foot under fluoroscopy guidance; Mark Johnson    Osteoarthritis of right knee    Synvisc-Ander, Mark Frazieredgar    Osteopenia of multiple sites    Pneumothorax on right    s/p VATS bleb resection and pleurodesis    PONV (postoperative nausea and vomiting)    Rosacea    Skin cancer    multiple carcinomas of the skin    Sleep apnea    CPAP 2012    Sleep apnea    CPAP titration; weight reduction, CPAP 15 cm of water, aboidance of respiratory depressants including alcohol and sedatives    Visual impairment     Past Surgical History:   Procedure Laterality Date    Carpal tunnel release      Chemotherapy Right 1992    Cholecystectomy      Colonoscopy  05/25/2005    Perales's/constipation; EGD/colonoscopy;diverticulosis, internal hemorrhoids, gastritis, hiatal hernia, Perales's esophagus - continue PPI therapy     Colonoscopy  06/28/2011    change in bowel habits / Perales's; EGD colonoscopy biopsy; diverticula sigmoid colon, internal hemorrhoids, mild gastritis, histal hernia with reflux, mild esophagitis, no evidence of Perales's /  [facility] Ell[facility]     Colonoscopy      Fracture surgery      General sleep study  05/09/2012    obesity, hypersomnolence snoring; sleep study; obstructive sleep apnea, favorable response to CPAP 15 cm of water / Yoseph Everett f/u 6/6/12    Angelito localization wire 1 site left (cpt=19281) Left 1993    pre ca excisional bx    Mastectomy right Right     Other surgical history      Radiation right Right 1992    Thoracoscopy surg w pleurodesis Right 08/12/2024    VATS bleb resection and pleurodesis by Dr. Orona    Upper gi endoscopy,exam  2002    Perales's esophagus; EGD 12/10/2002; Arpit Ellison     [facility]  History   Problem Relation Age of Onset    Heart Disease Other         grandfather    Stroke Other         aunt    Breast Cancer Sister 78        approx    Breast Cancer Self 46        approx    Breast Cancer Paternal Cousin Female 78        approx    Heart Disorder Father     Diabetes Mother       reports that she has quit smoking. She has quit using smokeless tobacco. She reports current alcohol use of about 2.5 standard drinks of alcohol per week. She reports that she does not use drugs.    Objective:   Temp: 98.2 °F (36.8 °C)  Pulse: 111  Resp: 20  BP: 143/79    Intake/Output:     Intake/Output Summary (Last 24 hours) at 10/6/2024 0921  Last data filed at 10/6/2024 0500  Gross per 24 hour   Intake --   Output 1000 ml   Net -1000 ml       Physical Exam:     General: NAD  HEENT: Normocephalic, anicteric sclera, neck supple.  Neck: No JVD, carotids 2+, no bruits.  Cardiac: Regular rate and rhythm. S1, S2 normal. No murmur, pericardial rub, S3.  Lungs: Clear without wheezes, rales, rhonchi or dullness.  Normal excursions and effort.  Abdomen: Soft, non-tender. BS-present.  Extremities: Without clubbing, cyanosis or edema.  Peripheral pulses are 2+.  Neurologic: Non-focal  Skin: Warm and dry.       Wayne May MD  10/6/2024  9:21 AM

## 2024-10-06 NOTE — PLAN OF CARE
Pt. Annmarie is A&Ox 4. Lives at home alone. Patient is ambulating using SBA with a cane. Patient isn't experiencing pain. Continent of bowel and bladder. Heparin gtt and cardizem gtt running. Call light within reach, fall precautions maintained.    Plan is ELENO and cardioversion tomorrow morning, NPO 0000.    Problem: Patient Centered Care  Goal: Patient preferences are identified and integrated in the patient's plan of care  Description: Interventions:  - What would you like us to know as we care for you? I live at home alone  - Provide timely, complete, and accurate information to patient/family  - Incorporate patient and family knowledge, values, beliefs, and cultural backgrounds into the planning and delivery of care  - Encourage patient/family to participate in care and decision-making at the level they choose  - Honor patient and family perspectives and choices  Outcome: Progressing     Problem: PAIN - ADULT  Goal: Verbalizes/displays adequate comfort level or patient's stated pain goal  Description: INTERVENTIONS:  - Encourage pt to monitor pain and request assistance  - Assess pain using appropriate pain scale  - Administer analgesics based on type and severity of pain and evaluate response  - Implement non-pharmacological measures as appropriate and evaluate response  - Consider cultural and social influences on pain and pain management  - Manage/alleviate anxiety  - Utilize distraction and/or relaxation techniques  - Monitor for opioid side effects  - Notify MD/LIP if interventions unsuccessful or patient reports new pain  - Anticipate increased pain with activity and pre-medicate as appropriate  Outcome: Progressing     Problem: RISK FOR INFECTION - ADULT  Goal: Absence of fever/infection during anticipated neutropenic period  Description: INTERVENTIONS  - Monitor WBC  - Administer growth factors as ordered  - Implement neutropenic guidelines  Outcome: Progressing

## 2024-10-06 NOTE — PROGRESS NOTES
Progress Note     Annmarie Levin Patient Status:  Inpatient    1945 MRN [id number]   Location [facility] 3W/SW Attending Felicia Mendez MD   Hosp Day # 1 PCP LLOYD ANDREWS MD     Chief Complaint: sob, palpitations    Subjective:   S: Patient here with sob and palpitations  Feels tired  Still sob and feels heart palpitations, but much improved from yesterday    Review of Systems:   10 point ROS completed and was negative, except for pertinent positive and negatives stated in subjective.    Objective:   Vital signs:  Temp:  [97.8 °F (36.6 °C)-99.1 °F (37.3 °C)] 98.2 °F (36.8 °C)  Pulse:  [109-174] 111  Resp:  [18-39] 20  BP: (115-143)/(69-97) 143/79  SpO2:  [92 %-98 %] 93 %    Wt Readings from Last 6 Encounters:   10/06/24 182 lb 3.2 oz (82.6 kg)   24 187 lb (84.8 kg)   24 184 lb 11.9 oz (83.8 kg)   24 186 lb (84.4 kg)   24 191 lb (86.6 kg)   23 192 lb (87.1 kg)         Physical Exam:    General: No acute distress. Alert ,         Respiratory: Clear to auscultation bilaterally. No wheezes. No rhonchi.  Cardiovascular: S1, S2. Irregularily irregular, tachycardic  Abdomen: Soft, nontender, nondistended.  Positive bowel sounds. No rebound or guarding.  Neurologic: No focal neurological deficits.   Musculoskeletal: Moves all extremities.  Extremities: No edema.    Results:   Diagnostic Data:      Labs:    Labs Last 24 Hours:   BMP     CBC    Other     Na 137 Cl 101 BUN 8 Glu 135   Hb 9.4   PTT 46.0 Procal -   K 3.7 CO2 30.0 Cr 0.61   WBC 11.1 >< .0  INR 1.08 CRP -   Renal Lytes Endo    Hct 28.3   Trop - D dim -   eGFR - Ca 8.8 POC Gluc  -    LFT   pBNP 1,296 Lactic -   eGFR AA - PO4 - A1c -   AST 79 APk 223 Prot 6.7  LDL -     Mg 1.5 TSH -   ALT 57 T lesly 0.7 Alb 3.9        COVID-19 Lab Results    COVID-19  Lab Results   Component Value Date    COVID19 Not Detected 10/14/2022       Pro-Calcitonin  No results for input(s): \"PCT\" in the last 168  hours.    Cardiac  Recent Labs   Lab 10/05/24  1500   PBNP 1,296*       Creatinine Kinase  No results for input(s): \"CK\" in the last 168 hours.    Inflammatory Markers  No results for input(s): \"CRP\", \"SONIA\", \"LDH\", \"DDIMER\" in the last 168 hours.    Imaging: Imaging data reviewed in Epic.    Medications:    fluticasone-salmeterol  1 puff Inhalation BID    magnesium oxide  800 mg Oral Once    dilTIAZem  30 mg Oral 4 times per day       Assessment & Plan:   ASSESSMENT / PLAN:         Paroxysmal Afib with RVR  -[id number] 3  -Cardiology on consult  -Started on Cardizem drip  -Heparin drip for AC--> transition to doac per cardiology  -TSH 1.245 on 9/26/24  -Tele monitoring  -post op a. Fib noted in August  -would be interested in watchman device    SOB likely 2/2 worsening pulmonary edema  Persistent R pleural effusion  -CXR reviewed  -CT chest pending  -pBNP elevated- 1,296  -Recently completed course of Z-percy  -No wheezing on exam, hold off on steroids at this time  -monitor pulse ox  -hx. Of recent pneumothorax and bleb resection  -s/p vats on 8/12  -procalcitonin low    Chronic respiratory failure  Hx of COPD  -Continue home inhalers  -Saturating well on 3-4L NC, baseline 2L  -Monitor pulse ox, supplement as indicated  -Hold off on steroids at this time    Obesity with NADJA  -BMI 33  -Counseled on making healthy lifestyle and dietary changes    Ch. Anemia  -check tibc     Hx. Breast Cancer  Hx. Of Mediastinal mass        Quality:  DVT Prophylaxis: hep gtt  CODE status: full  Das:    Central line: n/a  Dispo: further recs pending clinical course      Will the patient be referred to TCC on discharge?: yes  Estimated date of discharge: tbd  Discharge is dependent on: clinical improvement  At this point Ms. Levin is expected to be discharge to: home    Plan of care discussed with patient, nursing    Outpatient records or previous hospital records reviewed.   Patient and/or patient's family given opportunity to ask  questions and note understanding and agreeing with therapeutic plan as outlined     Coordinated care with providers and counseling re: treatment plan and workup    MDM: High complexity     JOSE DELGADO MD    Supplementary Documentation:

## 2024-10-06 NOTE — PROGRESS NOTES
Cardiology Progress Note    Annmarie Levin Patient Status:  Inpatient    1945 MRN [id number]   Location [facility] 3W/SW Attending Felicia Mendez MD   Hosp Day # 1 PCP LLOYD ANDREWS MD     Annmarie Levin is a a(n) 78 year old female with history of COPD, sleep apnea uses CPAP, HLD and breast cancer, recent pneumothorax and s/p VATS and belb resection on 2024, post op afib during that admission.     Overnight no cardiovascular complaints however uncontrolled A-fib rates        Assessment:    A-fib with RVR: Difficult to control rates.  Telemetry A-fib with RVR, heart rate 150s.  Unclear etiology for RVR, CT chest without pulmonary embolism or pneumonia.  HFrEF: Ejection fraction mildly reduced 50% per echocardiogram 2024, no significant valve disease.  Cough and fever, concern for pneumonia  COPD, NADJA        Plan:  Continue diltiazem drip protocol   Add metoprolol 25 mg Q4  Can add digoxin if still RVR  N.p.o. midnight for ELENO/cardioversion tomorrow  Avoid amiodarone given elevated LFTs  Continue heparin IV  Check UA, procalcitonin      Level of care: L3    Wayne May MD  Interventional Cardiology  [facility]    --------------------------------------------------------------------------------------------------------------------------------  ROS 12 systems reviewed, pertinent findings above.  ROS    History:  Past Medical History:    Arrhythmia    hx of rapid heartbeat     Perales's esophagus    Perales's esophagus    path consistent; EGD 2007; Arpit Ellison    Breast cancer (HCC)     - [id number];  Mastectomy/Chemo/Radiation.,right breast    Breast cancer of upper-outer quadrant of right female breast (HCC)     - [id number];  Mastectomy/Chemo/Radiation     COPD (chronic obstructive pulmonary disease) (HCC)    Environmental allergies    Esophageal reflux    History of stomach ulcers    hx    Hyperlipidemia    Malignant neoplasm of upper-outer quadrant  of right breast in female, estrogen receptor negative (HCC)    1992 - [id number];  Mastectomy/Chemo/Radiation     Mixed hyperlipidemia    Organic cardiac disease    Osteoarthritis    bilateral knee    Osteoarthritis of left knee    Synvisc-One injection, Mark Freddieedgar    Osteoarthritis of right foot    steroid injection of subtalar joint of the right foot under fluoroscopy guidance; FreddieedgarMark    Osteoarthritis of right knee    Synvisc-One, Mark Frazieredgar    Osteopenia of multiple sites    Pneumothorax on right    s/p VATS bleb resection and pleurodesis    PONV (postoperative nausea and vomiting)    Rosacea    Skin cancer    multiple carcinomas of the skin    Sleep apnea    CPAP 2012    Sleep apnea    CPAP titration; weight reduction, CPAP 15 cm of water, aboidance of respiratory depressants including alcohol and sedatives    Visual impairment     Past Surgical History:   Procedure Laterality Date    Carpal tunnel release      Chemotherapy Right 1992    Cholecystectomy      Colonoscopy  05/25/2005    Perales's/constipation; EGD/colonoscopy;diverticulosis, internal hemorrhoids, gastritis, hiatal hernia, Perales's esophagus - continue PPI therapy     Colonoscopy  06/28/2011    change in bowel habits / Perales's; EGD colonoscopy biopsy; diverticula sigmoid colon, internal hemorrhoids, mild gastritis, histal hernia with reflux, mild esophagitis, no evidence of Perales's /  Mohamed Sait     Colonoscopy      Fracture surgery      General sleep study  05/09/2012    obesity, hypersomnolence snoring; sleep study; obstructive sleep apnea, favorable response to CPAP 15 cm of water / Yoseph Everett f/u 6/6/12    Angelito localization wire 1 site left (cpt=19281) Left 1993    pre ca excisional bx    Mastectomy right Right     Other surgical history      Radiation right Right 1992    Thoracoscopy surg w pleurodesis Right 08/12/2024    VATS bleb resection and pleurodesis by Dr. Orona    Upper gi endoscopy,exam  2002    Perales's  esophagus; EGD 12/10/2002; Arpit Ellison     Family History   Problem Relation Age of Onset    Heart Disease Other         grandfather    Stroke Other         aunt    Breast Cancer Sister 78        approx    Breast Cancer Self 47        approx    Breast Cancer Paternal Cousin Female 78        approx    Heart Disorder Father     Diabetes Mother       reports that she has quit smoking. She has quit using smokeless tobacco. She reports current alcohol use of about 2.5 standard drinks of alcohol per week. She reports that she does not use drugs.    Objective:   Temp: 98.2 °F (36.8 °C)  Pulse: 111  Resp: 20  BP: 143/79    Intake/Output:     Intake/Output Summary (Last 24 hours) at 10/6/2024 0924  Last data filed at 10/6/2024 0500  Gross per 24 hour   Intake --   Output 1000 ml   Net -1000 ml       Physical Exam:    General: NAD  HEENT: Normocephalic, anicteric sclera, neck supple.  Neck: No JVD, carotids 2+, no bruits.  Cardiac: Irregularly irregular. S1, S2 normal. No murmur, pericardial rub, S3.  Lungs: Clear without wheezes, rales, rhonchi or dullness.  Normal excursions and effort.  Abdomen: Soft, non-tender. BS-present.  Extremities: Without clubbing, cyanosis or edema.  Peripheral pulses are 2+.  Neurologic: Non-focal  Skin: Warm and dry.       Wayne May MD  10/6/2024  9:24 AM

## 2024-10-07 ENCOUNTER — APPOINTMENT (OUTPATIENT)
Dept: CV DIAGNOSTICS | Facility: HOSPITAL | Age: 79
End: 2024-10-07
Attending: NURSE PRACTITIONER
Payer: MEDICARE

## 2024-10-07 ENCOUNTER — APPOINTMENT (OUTPATIENT)
Dept: INTERVENTIONAL RADIOLOGY/VASCULAR | Facility: HOSPITAL | Age: 79
End: 2024-10-07
Attending: NURSE PRACTITIONER
Payer: MEDICARE

## 2024-10-07 LAB
ANION GAP SERPL CALC-SCNC: 6 MMOL/L (ref 0–18)
APTT PPP: 39.5 SECONDS (ref 23–36)
APTT PPP: 46.2 SECONDS (ref 23–36)
APTT PPP: 55.5 SECONDS (ref 23–36)
BASOPHILS # BLD AUTO: 0.06 X10(3) UL (ref 0–0.2)
BASOPHILS NFR BLD AUTO: 0.6 %
BUN BLD-MCNC: 11 MG/DL (ref 9–23)
BUN/CREAT SERPL: 15.5 (ref 10–20)
CALCIUM BLD-MCNC: 9.7 MG/DL (ref 8.7–10.4)
CHLORIDE SERPL-SCNC: 100 MMOL/L (ref 98–112)
CO2 SERPL-SCNC: 29 MMOL/L (ref 21–32)
CREAT BLD-MCNC: 0.71 MG/DL
DEPRECATED RDW RBC AUTO: 54.6 FL (ref 35.1–46.3)
EGFRCR SERPLBLD CKD-EPI 2021: 88 ML/MIN/1.73M2 (ref 60–?)
EOSINOPHIL # BLD AUTO: 0.35 X10(3) UL (ref 0–0.7)
EOSINOPHIL NFR BLD AUTO: 3.4 %
ERYTHROCYTE [DISTWIDTH] IN BLOOD BY AUTOMATED COUNT: 14.9 % (ref 11–15)
GLUCOSE BLD-MCNC: 142 MG/DL (ref 70–99)
HCT VFR BLD AUTO: 31.8 %
HGB BLD-MCNC: 10.2 G/DL
IMM GRANULOCYTES # BLD AUTO: 0.18 X10(3) UL (ref 0–1)
IMM GRANULOCYTES NFR BLD: 1.8 %
LYMPHOCYTES # BLD AUTO: 1.37 X10(3) UL (ref 1–4)
LYMPHOCYTES NFR BLD AUTO: 13.4 %
MAGNESIUM SERPL-MCNC: 1.7 MG/DL (ref 1.6–2.6)
MCH RBC QN AUTO: 32.8 PG (ref 26–34)
MCHC RBC AUTO-ENTMCNC: 32.1 G/DL (ref 31–37)
MCV RBC AUTO: 102.3 FL
MONOCYTES # BLD AUTO: 0.97 X10(3) UL (ref 0.1–1)
MONOCYTES NFR BLD AUTO: 9.5 %
NEUTROPHILS # BLD AUTO: 7.31 X10 (3) UL (ref 1.5–7.7)
NEUTROPHILS # BLD AUTO: 7.31 X10(3) UL (ref 1.5–7.7)
NEUTROPHILS NFR BLD AUTO: 71.3 %
OSMOLALITY SERPL CALC.SUM OF ELEC: 282 MOSM/KG (ref 275–295)
PLATELET # BLD AUTO: 560 10(3)UL (ref 150–450)
POTASSIUM SERPL-SCNC: 4.9 MMOL/L (ref 3.5–5.1)
Q-T INTERVAL: 354 MS
QRS DURATION: 106 MS
QTC CALCULATION (BEZET): 435 MS
R AXIS: -40 DEGREES
RBC # BLD AUTO: 3.11 X10(6)UL
SODIUM SERPL-SCNC: 135 MMOL/L (ref 136–145)
T AXIS: 77 DEGREES
VENTRICULAR RATE: 91 BPM
WBC # BLD AUTO: 10.2 X10(3) UL (ref 4–11)

## 2024-10-07 PROCEDURE — 99233 SBSQ HOSP IP/OBS HIGH 50: CPT | Performed by: HOSPITALIST

## 2024-10-07 RX ORDER — MIDAZOLAM HYDROCHLORIDE 1 MG/ML
INJECTION INTRAMUSCULAR; INTRAVENOUS
Status: COMPLETED
Start: 2024-10-07 | End: 2024-10-07

## 2024-10-07 RX ORDER — MAGNESIUM OXIDE 400 MG/1
400 TABLET ORAL ONCE
Status: COMPLETED | OUTPATIENT
Start: 2024-10-07 | End: 2024-10-07

## 2024-10-07 RX ORDER — METHOHEXITAL IN WATER/PF 100MG/10ML
SYRINGE (ML) INTRAVENOUS
Status: COMPLETED
Start: 2024-10-07 | End: 2024-10-07

## 2024-10-07 NOTE — IVS NOTE
Annmarie Ollie  [id number]  10/7/2024    Post procedure/ recovery hand-off report given to Marianela PRICE. Transesophageal Echocardiogram and Cardioversion were not performed due to patient desaturating after sedation was given.  Patient's vital signs stable and patient is alert.     Miguelina TUBBS RN, RN

## 2024-10-07 NOTE — PROGRESS NOTES
Progress Note  Annmarie Levin Patient Status:  Inpatient    1945 MRN [id number]   Location [facility] 3W/SW Attending Felicia Mendez MD   Hosp Day # 2 PCP LLOYD ANDREWS MD     Subjective:  Pt c/o fatigue, SOB, some palpitations. Denies chest pain.     Objective:  /47 (BP Location: Left arm)   Pulse 103   Temp 98.2 °F (36.8 °C) (Axillary)   Resp 20   Ht 5' (1.524 m)   Wt 185 lb 1.6 oz (84 kg)   LMP 10/18/1991 (Approximate)   SpO2 96%   BMI 36.15 kg/m²     Telemetry: AFib 120's-150's    Intake/Output:    Intake/Output Summary (Last 24 hours) at 10/7/2024 0726  Last data filed at 10/7/2024 0600  Gross per 24 hour   Intake 898.08 ml   Output 650 ml   Net 248.08 ml       Last 3 Weights   10/07/24 0500 185 lb 1.6 oz (84 kg)   10/06/24 0500 182 lb 3.2 oz (82.6 kg)   10/05/24 2035 188 lb (85.3 kg)   10/05/24 1835 188 lb 8 oz (85.5 kg)   10/05/24 1638 184 lb 4.9 oz (83.6 kg)   10/05/24 1439 183 lb (83 kg)   24 1406 187 lb (84.8 kg)   24 0630 184 lb 11.9 oz (83.8 kg)   24 0005 189 lb 6.4 oz (85.9 kg)   24 1904 186 lb (84.4 kg)       Labs:  Recent Labs   Lab 10/05/24  1500 10/06/24  0810   * 135*   BUN 10 8*   CREATSERUM 0.63 0.61   EGFRCR 91 92   CA 9.1 8.8   * 137   K 3.8 3.7    101   CO2 28.0 30.0     Recent Labs   Lab 10/05/24  1500 10/06/24  0810 10/07/24  0649   RBC 2.99* 2.83* 3.11*   HGB 10.0* 9.4* 10.2*   HCT 30.3* 28.3* 31.8*   .3* 100.0 102.3*   MCH 33.4 33.2 32.8   MCHC 33.0 33.2 32.1   RDW 15.0 14.8 14.9   NEPRELIM 9.30* 8.67* 7.31   WBC 11.5* 11.1* 10.2   .0* 437.0 560.0*         Recent Labs   Lab 10/05/24  1500   TROPHS 10       Diagnostics:  No results found.   Review of Systems   Constitutional: Positive for malaise/fatigue.   Cardiovascular:  Positive for dyspnea on exertion and palpitations. Negative for chest pain and leg swelling.   Respiratory:  Positive for cough and shortness of breath.        Physical  Exam:    Gen: alert, oriented x 3, NAD  Heent: pupils equal, reactive. Mucous membranes moist.   Neck: no jvd  Cardiac: irregular rate and rhythm, normal S1,S2, no murmur, clicks, rub or gallop  Lungs: diminished  Abd: soft, NT/ND +bs  Ext: no edema  Skin: Warm, dry  Neuro: No focal deficits      Medications:     fluticasone-salmeterol  1 puff Inhalation BID    metoprolol tartrate  25 mg Oral 4x daily    sodium chloride   Intravenous On Call    benzocaine  1 spray Mouth/Throat Once    dilTIAZem  30 mg Oral 4 times per day      continuous dose heparin 1,300 Units/hr (10/06/24 1640)    dilTIAZem 15 mg/hr (10/07/24 0249)       Assessment:  Paroxysmal Atrial Fibrillation, RVR  Had prev episode of PAF postop in 8/2024  Rates remain difficult to control; on IV diltiazem gtt  On po lopressor 25mg q 4 hrs, po diltiazem 30mg q 6 hrs  S/P IV digoxin  Echo w/LVEF 50%, LA volume normal, PASP 35mmHg  TSH WNL  [id number] 3 - hx on asa only; now on IV heparin gtt  Plan for ELENO/Cardioversion today  Recent Cough/Fever, R pleural Effusion  On po antibx   WBC, Procal mild elev  Elevated LFT's  Hx Recent Pneumothorax s/p VATS, Bleb Resection 8/2024  Hyperlipidemia -   Hx COPD  NADJA - CPAP  Hx Breast Cancer    Plan:  Afib rates remain uncontrolled - 120's-150's on IV diltiazem  Continue IV heparin gtt for stroke prophlaxis with eventual transition to   NPO   Plan for ELENO/Cardioversion today - procedure, risks/benefits discussed. Pt is agreeable to proceed today.     Plan of care discussed with patient, RN.    Jenniffer August, APRN  10/7/2024  7:26 AM  [phone] [facility]  [phone] [facility]

## 2024-10-07 NOTE — PLAN OF CARE
Pt was notified at start of shift of cardiology's plan for ELENO cardioversion 10/7 but is declining procedure until cardiology provider comes to bedside to speak with her; CECILIA LEYVA notified. Heparin gtt and cardizem gtt continued; cardizem gtt decreased to 15ml/hr. Afib rates 90-100s. NPO since MN. Unable to place 20g IV in R AC d/t R arm precautions. Pre-procedure EKG completed this AM.    Call light within reach, safety precautions in place  Problem: PAIN - ADULT  Goal: Verbalizes/displays adequate comfort level or patient's stated pain goal  Description: INTERVENTIONS:  - Encourage pt to monitor pain and request assistance  - Assess pain using appropriate pain scale  - Administer analgesics based on type and severity of pain and evaluate response  - Implement non-pharmacological measures as appropriate and evaluate response  - Consider cultural and social influences on pain and pain management  - Manage/alleviate anxiety  - Utilize distraction and/or relaxation techniques  - Monitor for opioid side effects  - Notify MD/LIP if interventions unsuccessful or patient reports new pain  - Anticipate increased pain with activity and pre-medicate as appropriate  Outcome: Progressing     Problem: CARDIOVASCULAR - ADULT  Goal: Maintains optimal cardiac output and hemodynamic stability  Description: INTERVENTIONS:  - Monitor vital signs, rhythm, and trends  - Monitor for bleeding, hypotension and signs of decreased cardiac output  - Evaluate effectiveness of vasoactive medications to optimize hemodynamic stability  - Monitor arterial and/or venous puncture sites for bleeding and/or hematoma  - Assess quality of pulses, skin color and temperature  - Assess for signs of decreased coronary artery perfusion - ex. Angina  - Evaluate fluid balance, assess for edema, trend weights  Outcome: Progressing  Goal: Absence of cardiac arrhythmias or at baseline  Description: INTERVENTIONS:  - Continuous cardiac monitoring, monitor vital  signs, obtain 12 lead EKG if indicated  - Evaluate effectiveness of antiarrhythmic and heart rate control medications as ordered  - Initiate emergency measures for life threatening arrhythmias  - Monitor electrolytes and administer replacement therapy as ordered  Outcome: Progressing     Problem: RESPIRATORY - ADULT  Goal: Achieves optimal ventilation and oxygenation  Description: INTERVENTIONS:  - Assess for changes in respiratory status  - Assess for changes in mentation and behavior  - Position to facilitate oxygenation and minimize respiratory effort  - Oxygen supplementation based on oxygen saturation or ABGs  - Provide Smoking Cessation handout, if applicable  - Encourage broncho-pulmonary hygiene including cough, deep breathe, Incentive Spirometry  - Assess the need for suctioning and perform as needed  - Assess and instruct to report SOB or any respiratory difficulty  - Respiratory Therapy support as indicated  - Manage/alleviate anxiety  - Monitor for signs/symptoms of CO2 retention  Outcome: Progressing

## 2024-10-07 NOTE — PROGRESS NOTES
Pulmonary Progress Note     Assessment / Plan:  R sided apical bulla vs ptx and small basilar effusion.   - Apical bulla or ptx actually does not have fluid like before. Chronic in nature and s/p R sided VATS plerodesis.   - monitor clinically  NADJA  - OK to use CPAP. Findings are c/w post pleurodesis findings.   - Resume at home settings and use pt mask or auto 4-16 if not available.   AFib  - Per cardiology following, plan for ELENO cardioversion  - Cardizem gtt and heparin  ID: s/p URI   Completed abx with azithro.   Monitor off abx.   Proph  Anticoagulated.    Dispo  - will follow      Subjective:  No acute events, no new complaints     Objective:  Vitals:    10/07/24 0004 10/07/24 0300 10/07/24 0500 10/07/24 0613   BP: 105/49   124/47   BP Location: Left arm   Left arm   Pulse: 94 105  103   Resp: 20   20   Temp:    98.2 °F (36.8 °C)   TempSrc:    Axillary   SpO2: 97%   96%   Weight:   185 lb 1.6 oz (84 kg)    Height:         Physical Exam:  General - alert in NAD  Respiratory - diminished at the bases  Cardiovascular - irregular rate and rhythm  Abdo - soft, NTND  Ext - no clubbing  Mental status - interactive and answering questions appropriately    Medications:  Reviewed in EMR    Lab Data:  Reviewed in EMR    Imaging:  I independently visualized all relevant chest imaging in PACS and agree with radiology interpretation except where noted.

## 2024-10-07 NOTE — PLAN OF CARE
Patient on 4 L of oxygen. Cardizem and heparin gtt.  ELENO and cardioversion today unsuccessful. Plan: Repeat ELENO and Cardioversion tomorrow, NPO at midnight. Call light within reach. Safety precautions in place    Problem: Patient Centered Care  Goal: Patient preferences are identified and integrated in the patient's plan of care  Description: Interventions:  - What would you like us to know as we care for you? I was recently admitted to [place] in the PMU  - Provide timely, complete, and accurate information to patient/family  - Incorporate patient and family knowledge, values, beliefs, and cultural backgrounds into the planning and delivery of care  - Encourage patient/family to participate in care and decision-making at the level they choose  - Honor patient and family perspectives and choices  10/7/2024 1057 by Marianela Ley RN  Outcome: Progressing  10/7/2024 1055 by Marianela Ley RN  Outcome: Progressing     Problem: Patient/Family Goals  Goal: Patient/Family Long Term Goal  Description: Patient's Long Term Goal: Go home safely     Interventions:  - Cardiology, pulm and primary clearance  - See additional Care Plan goals for specific interventions  10/7/2024 1057 by Marianela Ley RN  Outcome: Progressing  10/7/2024 1055 by Marianela Ley RN  Outcome: Progressing  Goal: Patient/Family Short Term Goal  Description: Patient's Short Term Goal: Feel better     Interventions:   - Following medical plan  - See additional Care Plan goals for specific interventions  10/7/2024 1057 by Marianela Ley RN  Outcome: Progressing  10/7/2024 1055 by Marianela Ley RN  Outcome: Progressing     Problem: PAIN - ADULT  Goal: Verbalizes/displays adequate comfort level or patient's stated pain goal  Description: INTERVENTIONS:  - Encourage pt to monitor pain and request assistance  - Assess pain using appropriate pain scale  - Administer analgesics based on type and severity of pain and evaluate response  - Implement  non-pharmacological measures as appropriate and evaluate response  - Consider cultural and social influences on pain and pain management  - Manage/alleviate anxiety  - Utilize distraction and/or relaxation techniques  - Monitor for opioid side effects  - Notify MD/LIP if interventions unsuccessful or patient reports new pain  - Anticipate increased pain with activity and pre-medicate as appropriate  10/7/2024 1057 by Marianela Ley RN  Outcome: Progressing  10/7/2024 1055 by Marianela Ley RN  Outcome: Progressing     Problem: RISK FOR INFECTION - ADULT  Goal: Absence of fever/infection during anticipated neutropenic period  Description: INTERVENTIONS  - Monitor WBC  - Administer growth factors as ordered  - Implement neutropenic guidelines  10/7/2024 1057 by Marianela Ley RN  Outcome: Progressing  10/7/2024 1055 by Marianela Ley RN  Outcome: Progressing     Problem: CARDIOVASCULAR - ADULT  Goal: Maintains optimal cardiac output and hemodynamic stability  Description: INTERVENTIONS:  - Monitor vital signs, rhythm, and trends  - Monitor for bleeding, hypotension and signs of decreased cardiac output  - Evaluate effectiveness of vasoactive medications to optimize hemodynamic stability  - Monitor arterial and/or venous puncture sites for bleeding and/or hematoma  - Assess quality of pulses, skin color and temperature  - Assess for signs of decreased coronary artery perfusion - ex. Angina  - Evaluate fluid balance, assess for edema, trend weights  10/7/2024 1057 by Marianela Ley RN  Outcome: Progressing  10/7/2024 1055 by Marianela Ley RN  Outcome: Progressing  Goal: Absence of cardiac arrhythmias or at baseline  Description: INTERVENTIONS:  - Continuous cardiac monitoring, monitor vital signs, obtain 12 lead EKG if indicated  - Evaluate effectiveness of antiarrhythmic and heart rate control medications as ordered  - Initiate emergency measures for life threatening arrhythmias  - Monitor electrolytes and  administer replacement therapy as ordered  10/7/2024 1057 by Marianela Ley RN  Outcome: Progressing  10/7/2024 1055 by Marianela Ley RN  Outcome: Progressing     Problem: RESPIRATORY - ADULT  Goal: Achieves optimal ventilation and oxygenation  Description: INTERVENTIONS:  - Assess for changes in respiratory status  - Assess for changes in mentation and behavior  - Position to facilitate oxygenation and minimize respiratory effort  - Oxygen supplementation based on oxygen saturation or ABGs  - Provide Smoking Cessation handout, if applicable  - Encourage broncho-pulmonary hygiene including cough, deep breathe, Incentive Spirometry  - Assess the need for suctioning and perform as needed  - Assess and instruct to report SOB or any respiratory difficulty  - Respiratory Therapy support as indicated  - Manage/alleviate anxiety  - Monitor for signs/symptoms of CO2 retention  10/7/2024 1057 by Marianela Ley RN  Outcome: Progressing  10/7/2024 1055 by Marianela Ley RN  Outcome: Progressing

## 2024-10-08 ENCOUNTER — ANESTHESIA (OUTPATIENT)
Dept: INTERVENTIONAL RADIOLOGY/VASCULAR | Facility: HOSPITAL | Age: 79
End: 2024-10-08
Payer: MEDICARE

## 2024-10-08 ENCOUNTER — APPOINTMENT (OUTPATIENT)
Dept: CV DIAGNOSTICS | Facility: HOSPITAL | Age: 79
End: 2024-10-08
Attending: INTERNAL MEDICINE
Payer: MEDICARE

## 2024-10-08 ENCOUNTER — APPOINTMENT (OUTPATIENT)
Dept: CARDIAC REHAB | Facility: HOSPITAL | Age: 79
End: 2024-10-08
Attending: INTERNAL MEDICINE
Payer: MEDICARE

## 2024-10-08 ENCOUNTER — APPOINTMENT (OUTPATIENT)
Dept: INTERVENTIONAL RADIOLOGY/VASCULAR | Facility: HOSPITAL | Age: 79
End: 2024-10-08
Attending: INTERNAL MEDICINE
Payer: MEDICARE

## 2024-10-08 LAB
ANION GAP SERPL CALC-SCNC: 4 MMOL/L (ref 0–18)
APTT PPP: 57.2 SECONDS (ref 23–36)
ATRIAL RATE: 65 BPM
ATRIAL RATE: 68 BPM
BASOPHILS # BLD AUTO: 0.05 X10(3) UL (ref 0–0.2)
BASOPHILS NFR BLD AUTO: 0.6 %
BUN BLD-MCNC: 10 MG/DL (ref 9–23)
BUN/CREAT SERPL: 16.1 (ref 10–20)
CALCIUM BLD-MCNC: 9.4 MG/DL (ref 8.7–10.4)
CHLORIDE SERPL-SCNC: 104 MMOL/L (ref 98–112)
CO2 SERPL-SCNC: 31 MMOL/L (ref 21–32)
CREAT BLD-MCNC: 0.62 MG/DL
DEPRECATED RDW RBC AUTO: 54.6 FL (ref 35.1–46.3)
EGFRCR SERPLBLD CKD-EPI 2021: 92 ML/MIN/1.73M2 (ref 60–?)
EOSINOPHIL # BLD AUTO: 0.28 X10(3) UL (ref 0–0.7)
EOSINOPHIL NFR BLD AUTO: 3.1 %
ERYTHROCYTE [DISTWIDTH] IN BLOOD BY AUTOMATED COUNT: 14.9 % (ref 11–15)
GLUCOSE BLD-MCNC: 119 MG/DL (ref 70–99)
HCT VFR BLD AUTO: 31.6 %
HGB BLD-MCNC: 10.4 G/DL
IMM GRANULOCYTES # BLD AUTO: 0.22 X10(3) UL (ref 0–1)
IMM GRANULOCYTES NFR BLD: 2.4 %
LYMPHOCYTES # BLD AUTO: 1.24 X10(3) UL (ref 1–4)
LYMPHOCYTES NFR BLD AUTO: 13.8 %
MAGNESIUM SERPL-MCNC: 1.6 MG/DL (ref 1.6–2.6)
MCH RBC QN AUTO: 33.7 PG (ref 26–34)
MCHC RBC AUTO-ENTMCNC: 32.9 G/DL (ref 31–37)
MCV RBC AUTO: 102.3 FL
MONOCYTES # BLD AUTO: 0.78 X10(3) UL (ref 0.1–1)
MONOCYTES NFR BLD AUTO: 8.7 %
NEUTROPHILS # BLD AUTO: 6.41 X10 (3) UL (ref 1.5–7.7)
NEUTROPHILS # BLD AUTO: 6.41 X10(3) UL (ref 1.5–7.7)
NEUTROPHILS NFR BLD AUTO: 71.4 %
OSMOLALITY SERPL CALC.SUM OF ELEC: 288 MOSM/KG (ref 275–295)
P AXIS: 42 DEGREES
P AXIS: 44 DEGREES
P-R INTERVAL: 218 MS
P-R INTERVAL: 220 MS
PLATELET # BLD AUTO: 558 10(3)UL (ref 150–450)
POTASSIUM SERPL-SCNC: 3.9 MMOL/L (ref 3.5–5.1)
Q-T INTERVAL: 406 MS
Q-T INTERVAL: 416 MS
QRS DURATION: 96 MS
QRS DURATION: 98 MS
QTC CALCULATION (BEZET): 431 MS
QTC CALCULATION (BEZET): 432 MS
R AXIS: -35 DEGREES
R AXIS: -35 DEGREES
RBC # BLD AUTO: 3.09 X10(6)UL
SODIUM SERPL-SCNC: 139 MMOL/L (ref 136–145)
T AXIS: 30 DEGREES
T AXIS: 47 DEGREES
VENTRICULAR RATE: 65 BPM
VENTRICULAR RATE: 68 BPM
WBC # BLD AUTO: 9 X10(3) UL (ref 4–11)

## 2024-10-08 PROCEDURE — 5A2204Z RESTORATION OF CARDIAC RHYTHM, SINGLE: ICD-10-PCS | Performed by: STUDENT IN AN ORGANIZED HEALTH CARE EDUCATION/TRAINING PROGRAM

## 2024-10-08 PROCEDURE — 93320 DOPPLER ECHO COMPLETE: CPT | Performed by: INTERNAL MEDICINE

## 2024-10-08 PROCEDURE — 99233 SBSQ HOSP IP/OBS HIGH 50: CPT | Performed by: HOSPITALIST

## 2024-10-08 PROCEDURE — 93325 DOPPLER ECHO COLOR FLOW MAPG: CPT | Performed by: INTERNAL MEDICINE

## 2024-10-08 RX ORDER — ACETAMINOPHEN 500 MG
500 TABLET ORAL EVERY 4 HOURS PRN
Status: DISCONTINUED | OUTPATIENT
Start: 2024-10-08 | End: 2024-10-09

## 2024-10-08 RX ORDER — MAGNESIUM OXIDE 400 MG/1
400 TABLET ORAL ONCE
Status: COMPLETED | OUTPATIENT
Start: 2024-10-08 | End: 2024-10-08

## 2024-10-08 RX ORDER — DILTIAZEM HYDROCHLORIDE 100 MG/1
INJECTION, POWDER, LYOPHILIZED, FOR SOLUTION INTRAVENOUS
Status: COMPLETED
Start: 2024-10-08 | End: 2024-10-08

## 2024-10-08 RX ORDER — ACETAMINOPHEN 500 MG
500 TABLET ORAL EVERY 4 HOURS PRN
Status: DISCONTINUED | OUTPATIENT
Start: 2024-10-08 | End: 2024-10-08

## 2024-10-08 RX ORDER — ONDANSETRON 2 MG/ML
INJECTION INTRAMUSCULAR; INTRAVENOUS AS NEEDED
Status: DISCONTINUED | OUTPATIENT
Start: 2024-10-08 | End: 2024-10-08 | Stop reason: SURG

## 2024-10-08 RX ORDER — 0.9 % SODIUM CHLORIDE 0.9 %
INTRAVENOUS SOLUTION INTRAVENOUS
Status: COMPLETED
Start: 2024-10-08 | End: 2024-10-08

## 2024-10-08 RX ORDER — LIDOCAINE HYDROCHLORIDE 10 MG/ML
INJECTION, SOLUTION EPIDURAL; INFILTRATION; INTRACAUDAL; PERINEURAL AS NEEDED
Status: DISCONTINUED | OUTPATIENT
Start: 2024-10-08 | End: 2024-10-08 | Stop reason: SURG

## 2024-10-08 RX ORDER — METOPROLOL TARTRATE 25 MG/1
25 TABLET, FILM COATED ORAL
Status: DISCONTINUED | OUTPATIENT
Start: 2024-10-08 | End: 2024-10-09

## 2024-10-08 RX ADMIN — ONDANSETRON 4 MG: 2 INJECTION INTRAMUSCULAR; INTRAVENOUS at 10:47:00

## 2024-10-08 RX ADMIN — LIDOCAINE HYDROCHLORIDE 50 MG: 10 INJECTION, SOLUTION EPIDURAL; INFILTRATION; INTRACAUDAL; PERINEURAL at 10:35:00

## 2024-10-08 NOTE — PLAN OF CARE
Patient is alert and oriented x4. Patient went for ELENO and cardioversion with anesthesia earlier today. Rhythm converted to sinus after 1 shock. Cardizem drip discontinued. Patient throat pain managed with acetaminophen and lozenges. Cough managed with tessalon. Heparin gtt therapeutic, labs ordered for am draw. Patient is oriented to the call bell system and it is placed at bedside. Safety plan in place and frequent rounding completed.     Problem: Patient Centered Care  Goal: Patient preferences are identified and integrated in the patient's plan of care  Description: Interventions:  - What would you like us to know as we care for you? I was recently admitted to [place] in the PMU  - Provide timely, complete, and accurate information to patient/family  - Incorporate patient and family knowledge, values, beliefs, and cultural backgrounds into the planning and delivery of care  - Encourage patient/family to participate in care and decision-making at the level they choose  - Honor patient and family perspectives and choices  Outcome: Progressing     Problem: Patient/Family Goals  Goal: Patient/Family Long Term Goal  Description: Patient's Long Term Goal: Go home safely     Interventions:  - Cardiology, pulm and primary clearance  - See additional Care Plan goals for specific interventions  Outcome: Progressing  Goal: Patient/Family Short Term Goal  Description: Patient's Short Term Goal: Feel better     Interventions:   - Following medical plan  - See additional Care Plan goals for specific interventions  Outcome: Progressing     Problem: PAIN - ADULT  Goal: Verbalizes/displays adequate comfort level or patient's stated pain goal  Description: INTERVENTIONS:  - Encourage pt to monitor pain and request assistance  - Assess pain using appropriate pain scale  - Administer analgesics based on type and severity of pain and evaluate response  - Implement non-pharmacological measures as appropriate and evaluate response  -  Consider cultural and social influences on pain and pain management  - Manage/alleviate anxiety  - Utilize distraction and/or relaxation techniques  - Monitor for opioid side effects  - Notify MD/LIP if interventions unsuccessful or patient reports new pain  - Anticipate increased pain with activity and pre-medicate as appropriate  Outcome: Progressing     Problem: RISK FOR INFECTION - ADULT  Goal: Absence of fever/infection during anticipated neutropenic period  Description: INTERVENTIONS  - Monitor WBC  - Administer growth factors as ordered  - Implement neutropenic guidelines  Outcome: Adequate for Discharge     Problem: CARDIOVASCULAR - ADULT  Goal: Maintains optimal cardiac output and hemodynamic stability  Description: INTERVENTIONS:  - Monitor vital signs, rhythm, and trends  - Monitor for bleeding, hypotension and signs of decreased cardiac output  - Evaluate effectiveness of vasoactive medications to optimize hemodynamic stability  - Monitor arterial and/or venous puncture sites for bleeding and/or hematoma  - Assess quality of pulses, skin color and temperature  - Assess for signs of decreased coronary artery perfusion - ex. Angina  - Evaluate fluid balance, assess for edema, trend weights  Outcome: Adequate for Discharge  Goal: Absence of cardiac arrhythmias or at baseline  Description: INTERVENTIONS:  - Continuous cardiac monitoring, monitor vital signs, obtain 12 lead EKG if indicated  - Evaluate effectiveness of antiarrhythmic and heart rate control medications as ordered  - Initiate emergency measures for life threatening arrhythmias  - Monitor electrolytes and administer replacement therapy as ordered  Outcome: Adequate for Discharge     Problem: RESPIRATORY - ADULT  Goal: Achieves optimal ventilation and oxygenation  Description: INTERVENTIONS:  - Assess for changes in respiratory status  - Assess for changes in mentation and behavior  - Position to facilitate oxygenation and minimize respiratory  effort  - Oxygen supplementation based on oxygen saturation or ABGs  - Provide Smoking Cessation handout, if applicable  - Encourage broncho-pulmonary hygiene including cough, deep breathe, Incentive Spirometry  - Assess the need for suctioning and perform as needed  - Assess and instruct to report SOB or any respiratory difficulty  - Respiratory Therapy support as indicated  - Manage/alleviate anxiety  - Monitor for signs/symptoms of CO2 retention  Outcome: Progressing

## 2024-10-08 NOTE — IVS NOTE
Inpatient Throughput Communication:    Called inpatient RN Niurka and notified of scheduled procedure ELENO/Cardioversion on 10/8 1030.     Verified that appropriate consent is signed: Yes  Appropriate Consent Signed: Yes  Access Site Hair Clipped and skin prepped: Not Applicable  Patient has functional IV site: Yes  Patient received all pre-treatment medications: Not Applicable  Family aware of approximate time of procedure: Yes    Misti FERRARO RN,

## 2024-10-08 NOTE — PROCEDURES
[facility]    Cardiac Electrophysiology Procedure Note    Annmarie Levin Location:Cath Lab Suites   [facility] [id number] MRN [id number]   Admission Date 10/5/2024 Procedure Date 10/8/2024   Attending Physician Felicia Mendez MD Procedure Physician Aaron Geller MD       Pre-Operative Diagnosis: Persistent atrial fibrillation    Post-Operative Diagnosis: Persistent atrial fibrillation    Procedure Performed:    1.    Direct current cardioversion    Indication: Persistent atrial fibrillation    Complications: None apparent    Procedural findings: The patient was brought to the procedure suite in stable and postabsorptive state after providing informed consent.  A time out was performed to confirm the patient's identity and type and site of the procedure.  Anesthesia was administered by the attending anesthesiologist, please see separate record.  The patient received a 200 Joule synchronized biphasic shock via anterior-posterior pads, which converted the patient to sinus rhythm.  The patient was recovered by myself and staff and transferred to the recovery area in stable and comfortable condition.     RESULTS:  -- Successful electrical cardioversion to sinus rhythm    PLAN:  1. Recovery by myself and nursing  2. 12 lead ECG  3. Continue anticoagulation  4. Discontinue IV diltiazem    Aaron Geller MD, 10/08/24, 11:39 AM  [place] [facility] [place]

## 2024-10-08 NOTE — PLAN OF CARE
Cardiology Plan of Care Update    S/P successful cardioversion today. Currently on IV heparin gtt. Social work to check pricing for Eliquis. If affordable for patient, will plan to initiate Eliquis this evening.     Jenniffer August, APRN  10/08/24   4:35 PM  [phone] [place]  [phone] [place]

## 2024-10-08 NOTE — PLAN OF CARE
Heparin gtt continued, therapeutic overnight. Cardizem gtt lowered to 10ml/hr. NPO @ [place] for ELENO cardioversion.    Call light within reach, safety precautions in place  Problem: Patient Centered Care  Goal: Patient preferences are identified and integrated in the patient's plan of care  Description: Interventions:  - What would you like us to know as we care for you? I was recently admitted to [place] in the PMU  - Provide timely, complete, and accurate information to patient/family  - Incorporate patient and family knowledge, values, beliefs, and cultural backgrounds into the planning and delivery of care  - Encourage patient/family to participate in care and decision-making at the level they choose  - Honor patient and family perspectives and choices  Outcome: Progressing     Problem: PAIN - ADULT  Goal: Verbalizes/displays adequate comfort level or patient's stated pain goal  Description: INTERVENTIONS:  - Encourage pt to monitor pain and request assistance  - Assess pain using appropriate pain scale  - Administer analgesics based on type and severity of pain and evaluate response  - Implement non-pharmacological measures as appropriate and evaluate response  - Consider cultural and social influences on pain and pain management  - Manage/alleviate anxiety  - Utilize distraction and/or relaxation techniques  - Monitor for opioid side effects  - Notify MD/LIP if interventions unsuccessful or patient reports new pain  - Anticipate increased pain with activity and pre-medicate as appropriate  Outcome: Progressing     Problem: CARDIOVASCULAR - ADULT  Goal: Maintains optimal cardiac output and hemodynamic stability  Description: INTERVENTIONS:  - Monitor vital signs, rhythm, and trends  - Monitor for bleeding, hypotension and signs of decreased cardiac output  - Evaluate effectiveness of vasoactive medications to optimize hemodynamic stability  - Monitor arterial and/or venous puncture sites for bleeding and/or  hematoma  - Assess quality of pulses, skin color and temperature  - Assess for signs of decreased coronary artery perfusion - ex. Angina  - Evaluate fluid balance, assess for edema, trend weights  Outcome: Progressing     Problem: RESPIRATORY - ADULT  Goal: Achieves optimal ventilation and oxygenation  Description: INTERVENTIONS:  - Assess for changes in respiratory status  - Assess for changes in mentation and behavior  - Position to facilitate oxygenation and minimize respiratory effort  - Oxygen supplementation based on oxygen saturation or ABGs  - Provide Smoking Cessation handout, if applicable  - Encourage broncho-pulmonary hygiene including cough, deep breathe, Incentive Spirometry  - Assess the need for suctioning and perform as needed  - Assess and instruct to report SOB or any respiratory difficulty  - Respiratory Therapy support as indicated  - Manage/alleviate anxiety  - Monitor for signs/symptoms of CO2 retention  Outcome: Progressing     Problem: CARDIOVASCULAR - ADULT  Goal: Absence of cardiac arrhythmias or at baseline  Description: INTERVENTIONS:  - Continuous cardiac monitoring, monitor vital signs, obtain 12 lead EKG if indicated  - Evaluate effectiveness of antiarrhythmic and heart rate control medications as ordered  - Initiate emergency measures for life threatening arrhythmias  - Monitor electrolytes and administer replacement therapy as ordered  Outcome: Not Progressing

## 2024-10-08 NOTE — ANESTHESIA PREPROCEDURE EVALUATION
Anesthesia PreOp Note    HPI:     Annmarie Levin is a 78 year old female who presents for preoperative consultation requested by: * No surgeons listed *    Date of Surgery: 10/8/2024    * No procedures listed *  Indication: * No pre-op diagnosis entered *    Relevant Problems   No relevant active problems       NPO:                         History Review:  Patient Active Problem List    Diagnosis Date Noted    Atrial fibrillation with RVR ([facility]) 10/05/2024    Pneumothorax, unspecified type 08/04/2024    Mediastinal mass 06/20/2023    Acute pain of right knee 09/06/2022    Fatigue 11/04/2020    Mixed hyperlipidemia 10/29/2020    Nontraumatic tear of right rotator cuff 08/18/2020    Status post mastectomy, right 10/26/2019    Soft tissue mass 09/16/2019    Chronic right shoulder pain 08/22/2019    Impingement syndrome of right shoulder 08/22/2019    Arthritis of shoulder region 08/22/2019    Right carpal tunnel syndrome 08/01/2018    Adult BMI 45.0-49.9 kg/sq m ([facility]) 09/28/2017    GERD (gastroesophageal reflux disease) 11/02/2015    Osteopenia of multiple sites 10/29/2014    DJD (degenerative joint disease) 10/29/2014    Sleep apnea 10/29/2012    Malignant neoplasm of upper-outer quadrant of right breast in female, estrogen receptor negative ([facility]) 04/30/2012       Past Medical History:    Arrhythmia    hx of rapid heartbeat     Perales's esophagus    Perales's esophagus    path consistent; EGD 07/17/2007; Mohamed Sait    Breast cancer ([facility])    1992 - [id number];  Mastectomy/Chemo/Radiation.,right breast    Breast cancer of upper-outer quadrant of right female breast ([facility])    1992 - [id number];  Mastectomy/Chemo/Radiation     COPD (chronic obstructive pulmonary disease) ([facility])    Environmental allergies    Esophageal reflux    History of stomach ulcers    hx    Hyperlipidemia    Malignant neoplasm of upper-outer quadrant of right breast in female, estrogen receptor negative ([facility])    1992 - [id number];   Mastectomy/Chemo/Radiation     Mixed hyperlipidemia    Organic cardiac disease    Osteoarthritis    bilateral knee    Osteoarthritis of left knee    Synvisc-One injection, Mark Freddieedgar    Osteoarthritis of right foot    steroid injection of subtalar joint of the right foot under fluoroscopy guidance; Mark Johnson    Osteoarthritis of right knee    Synvisc-One, Mark Frazieredgar    Osteopenia of multiple sites    Pneumothorax on right    s/p VATS bleb resection and pleurodesis    PONV (postoperative nausea and vomiting)    Rosacea    Skin cancer    multiple carcinomas of the skin    Sleep apnea    CPAP 2012    Sleep apnea    CPAP titration; weight reduction, CPAP 15 cm of water, aboidance of respiratory depressants including alcohol and sedatives    Visual impairment       Past Surgical History:   Procedure Laterality Date    Carpal tunnel release      Chemotherapy Right 1992    Cholecystectomy      Colonoscopy  05/25/2005    Perales's/constipation; EGD/colonoscopy;diverticulosis, internal hemorrhoids, gastritis, hiatal hernia, Perales's esophagus - continue PPI therapy     Colonoscopy  06/28/2011    change in bowel habits / Perales's; EGD colonoscopy biopsy; diverticula sigmoid colon, internal hemorrhoids, mild gastritis, histal hernia with reflux, mild esophagitis, no evidence of Perales's /  Mohadarsh Ellison     Colonoscopy      Fracture surgery      General sleep study  05/09/2012    obesity, hypersomnolence snoring; sleep study; obstructive sleep apnea, favorable response to CPAP 15 cm of water / Yoseph Everett f/u 6/6/12    Angelito localization wire 1 site left (cpt=19281) Left 1993    pre ca excisional bx    Mastectomy right Right     Other surgical history      Radiation right Right 1992    Thoracoscopy surg w pleurodesis Right 08/12/2024    VATS bleb resection and pleurodesis by Dr. Orona    Upper gi endoscopy,exam  2002    Perales's esophagus; EGD 12/10/2002; Arpit Ellison       Medications Prior to Admission    Medication Sig Dispense Refill Last Dose    [] Vibegron (GEMTESA) 75 MG Oral Tab Take 75 mg by mouth daily. 30 tablet 11     METOPROLOL SUCCINATE ER 50 MG Oral Tablet 24 Hr Take 1 tablet (50 mg total) by mouth Daily Beta Blocker. 30 tablet 1     tiotropium 18 MCG Inhalation Cap Inhale into the lungs daily.       GEMTESA 75 MG Oral Tab Take 1 tablet by mouth daily.       fluticasone propionate 50 MCG/ACT Nasal Suspension 2 sprays by Nasal route daily.       SYMBICORT 160-4.5 MCG/ACT Inhalation Aerosol Inhale 2 puffs into the lungs 2 (two) times daily.       albuterol 108 (90 Base) MCG/ACT Inhalation Aero Soln Inhale 2 puffs into the lungs every 4 (four) hours as needed.       PATIENT SUPPLIED MEDICATION 2L of O2 as needed       multivitamin Oral Tab Take 1 tablet by mouth daily with breakfast.       estradiol (ESTRACE) 0.1 MG/GM Vaginal Cream Apply 1/2 gram vaginally 2 times per week. Use at bedtime. 42.5 g 3     Cholecalciferol (VITAMIN D) 50 MCG (2000 UT) Oral Cap Take 1 capsule (2,000 Units total) by mouth daily.       Vitamin B-12 (VITAMIN B12) 500 MCG Oral Tab Take 1 tablet (500 mcg total) by mouth daily.       Multiple Vitamins-Minerals (MULTI FOR HER 50+) Oral Tab Take  by mouth daily.       Vaginal Moisturizer (LUVENA PREBIOTIC LUBRICANT VA) Place  vaginally. Indications: One every four days, per pt's medication list       Omeprazole 40 MG Oral Capsule Delayed Release Take 1 capsule (40 mg total) by mouth daily.       aspirin 325 MG Oral Tab Take  by mouth.       montelukast 10 MG Oral Tab Take by mouth.       Calcium Carbonate-Vitamin D 600-200 MG-UNIT Oral Cap Take  by mouth.        Current Facility-Administered Medications Ordered in Epic   Medication Dose Route Frequency Provider Last Rate Last Admin    metoprolol tartrate (Lopressor) tab 25 mg  25 mg Oral 2x Daily(Beta Blocker) Jenniffer August APRN        magnesium oxide (Mag-Ox) tab 400 mg  400 mg Oral Once Felicia Mendez MD        dilTIAZem  (cardIZEM) 100 MG injection             sodium chloride 0.9% IVPB             fluticasone-salmeterol (Advair Diskus) 250-50 MCG/ACT inhaler 1 puff  1 puff Inhalation BID Tootie Ye MD   1 puff at 10/08/24 0851    hydrOXYzine (Atarax) tab 25 mg  25 mg Oral TID PRN Tootie Ye MD   25 mg at 10/06/24 0213    sodium chloride 0.9% 0.9% flush injection 10 mL  10 mL Intravenous PRN Ayaz Robles APRN        heparin (Porcine) 97775 units/250mL infusion ACS/AFIB CONTINUOUS  200-3,000 Units/hr Intravenous Continuous Corbin Leroy MD 15 mL/hr at 10/08/24 0603 1,500 Units/hr at 10/08/24 0603    melatonin tab 3 mg  3 mg Oral Nightly PRN Chantel Cain MD   3 mg at 10/07/24 2148    acetaminophen (Tylenol Extra Strength) tab 500 mg  500 mg Oral Q4H PRN Chantel Cain MD   500 mg at 10/07/24 2148    benzonatate (Tessalon) cap 200 mg  200 mg Oral TID PRN Chantel Cain MD        ondansetron (Zofran) 4 MG/2ML injection 4 mg  4 mg Intravenous Q6H PRN Chantel Cain MD        metoclopramide (Reglan) 5 mg/mL injection 5 mg  5 mg Intravenous Q8H PRN Chantel Cain MD        polyethylene glycol (PEG 3350) (Miralax) 17 g oral packet 17 g  17 g Oral Daily PRN Chantel Cain MD        sennosides (Senokot) tab 17.2 mg  17.2 mg Oral Nightly PRN Chantel Cain MD        bisacodyl (Dulcolax) 10 MG rectal suppository 10 mg  10 mg Rectal Daily PRN Chantel Cain MD        fleet enema (Fleet) rectal enema 133 mL  1 enema Rectal Once PRN Chantel Cain MD        dilTIAZem 10 mg BOLUS FROM BAG infusion  10 mg Intravenous Q1H PRN Meena Bolanos, AUTUMN        dilTIAZem (cardIZEM) 100 mg in sodium chloride 0.9% 100 mL IVPB-ADDV  2.5-20 mg/hr Intravenous Continuous Meena Bolanos APRN 5 mL/hr at 10/08/24 0856 5 mg/hr at 10/08/24 0856    [Held by provider] dilTIAZem (cardIZEM) tab 30 mg  30 mg Oral 4 times per day Meena Bolanos APRN   30 mg at 10/08/24 0600     No current [facility]-ordered outpatient medications on file.       Allergies    Allergen Reactions    Sulfa Antibiotics FEVER    Adhesive Tape     Levaquin [Levofloxacin] RASH    Morphine NAUSEA ONLY     Other reaction(s): \"doesn't agree with me\"       Family History   Problem Relation Age of Onset    Heart Disease Other         grandfather    Stroke Other         aunt    Breast Cancer Sister 78        approx    Breast Cancer Self 46        approx    Breast Cancer Paternal Cousin Female 78        approx    Heart Disorder Father     Diabetes Mother      Social History     Socioeconomic History    Marital status:    Tobacco Use    Smoking status: Former    Smokeless tobacco: Former    Tobacco comments:     quit 25 yrs ago   Vaping Use    Vaping status: Never Used   Substance and Sexual Activity    Alcohol use: Yes     Alcohol/week: 2.5 standard drinks of alcohol     Types: 3 Standard drinks or equivalent per week     Comment: 3 drinks daily    Drug use: No       Available pre-op labs reviewed.  Lab Results   Component Value Date    WBC 9.0 10/08/2024    RBC 3.09 (L) 10/08/2024    HGB 10.4 (L) 10/08/2024    HCT 31.6 (L) 10/08/2024    .3 (H) 10/08/2024    MCH 33.7 10/08/2024    MCHC 32.9 10/08/2024    RDW 14.9 10/08/2024    .0 (H) 10/08/2024     Lab Results   Component Value Date     10/08/2024    K 3.9 10/08/2024     10/08/2024    CO2 31.0 10/08/2024    BUN 10 10/08/2024    CREATSERUM 0.62 10/08/2024     (H) 10/08/2024    CA 9.4 10/08/2024     Lab Results   Component Value Date    INR 1.08 10/05/2024       Vital Signs:  Body mass index is 36.19 kg/m².   height is 1.524 m (5') and weight is 84.1 kg (185 lb 4.8 oz). Her oral temperature is 97.8 °F (36.6 °C). Her blood pressure is 92/78 and her pulse is 90. Her respiration is 19 and oxygen saturation is 95%.   Vitals:    10/08/24 0442 10/08/24 0557 10/08/24 0700 10/08/24 0847   BP:  99/81  92/78   Pulse: 90 87 82 90   Resp:  20  19   Temp:  97.7 °F (36.5 °C)  97.8 °F (36.6 °C)   TempSrc:  Axillary  Oral    SpO2:  94%  95%   Weight:  84.1 kg (185 lb 4.8 oz)     Height:            Anesthesia Evaluation      History of anesthetic complications   Airway   Mallampati: II  TM distance: >3 FB  Neck ROM: full  Dental      Pulmonary    (+) COPD, sleep apnea  Cardiovascular   Exercise tolerance: poor    Rhythm: irregular  Rate: abnormal    Neuro/Psych    (+)  neuromuscular disease,        GI/Hepatic/Renal    (+) GERD poorly controlled    Endo/Other    Abdominal                  Anesthesia Plan:   ASA:  4  Plan:   MAC      I have informed Annmarie Levin and/or legal guardian or family member of the nature of the anesthetic plan, benefits, risks including possible dental damage if relevant, major complications, and any alternative forms of anesthetic management.   All of the patient's questions were answered to the best of my ability. The patient desires the anesthetic management as planned.  Jesus Manuel Greene MD  10/8/2024 10:02 AM  Present on Admission:  **None**

## 2024-10-08 NOTE — ANESTHESIA POSTPROCEDURE EVALUATION
Patient: Annmarie Levin    Procedure Summary       Date: 10/08/24 Room / Location: [facility] Interventional Suites    Anesthesia Start: 1034 Anesthesia Stop: 1054    Procedure: EP ELENO AND CARDIOVERSION Diagnosis: (Afib)    Scheduled Providers: Aaron Geller MD; Jesus Manuel Greene MD Anesthesiologist: Jesus Manuel Greene MD    Anesthesia Type: MAC ASA Status: 4            Anesthesia Type: MAC    Vitals Value Taken Time   /56 10/08/24 1054   Temp 36.7 10/08/24 1054   Pulse 65 10/08/24 1054   Resp 14 10/08/24 1054   SpO2 99 10/08/24 1054       [facility] AN Post Evaluation:   Patient Evaluated in floor  Patient Participation: complete - patient cannot participate  Level of Consciousness: awake  Pain Score: 1  Pain Management: adequate  Airway Patency:patent  Yes    Nausea/Vomiting: none  Cardiovascular Status: acceptable  Respiratory Status: acceptable  Postoperative Hydration acceptable      Jesus Manuel Greene MD  10/8/2024 10:54 AM

## 2024-10-08 NOTE — PROGRESS NOTES
Pulmonary Progress Note     Assessment / Plan:  R sided apical bulla vs ptx and small basilar effusion.   - Apical bulla or ptx actually does not have fluid like before. Chronic in nature and s/p R sided VATS pleurodesis. This is the expected evolution of this issue  - monitor clinically, no further intervention recommended  NADJA  - OK to use CPAP. Findings are c/w post pleurodesis findings.   - Resume at home settings and use pt mask or auto 4-16 if not available.   AFib  - Per cardiology following, plan for ELENO cardioversion  - Cardizem gtt and heparin  ID: s/p URI   Completed abx with azithro.   Monitor off abx.   Proph  Anticoagulated.    Dispo  - will follow      Subjective:  No acute events, no new complaints     Objective:  Vitals:    10/08/24 1110 10/08/24 1138 10/08/24 1223 10/08/24 1339   BP: 114/58 113/54  114/51   BP Location:  Left arm  Left arm   Pulse: 68 69  73   Resp:  18  18   Temp:  98 °F (36.7 °C)  98.2 °F (36.8 °C)   TempSrc:  Oral  Oral   SpO2: 94% 92% 94% 92%   Weight:       Height:         Physical Exam:  General - alert in NAD  Respiratory - diminished at the bases  Cardiovascular - irregular rate and rhythm  Abdo - soft, NTND  Ext - no clubbing  Mental status - interactive and answering questions appropriately    Medications:  Reviewed in EMR    Lab Data:  Reviewed in EMR    Imaging:  I independently visualized all relevant chest imaging in PACS and agree with radiology interpretation except where noted.

## 2024-10-08 NOTE — IVS NOTE
Hand-Off     Procedure hand off report given to Niurka PRICE.   Pt's vital signs are stable.   NPO status until 1130  Bedrest status until 1200  Dr. Geller and Dr. Greene spoke with patient pre procedure.

## 2024-10-08 NOTE — PROGRESS NOTES
Progress Note     Annmarie Levin Patient Status:  Inpatient    1945 MRN [id number]   Location [facility] 3W/SW Attending Felicia Mendez MD   Hosp Day # 3 PCP LLOYD ANDREWS MD     Chief Complaint: sob, palpitations    Subjective:   S: Patient here with sob and palpitations  Feels tired  S/p ELENO successful  Feels ok  Denies cp, dizziness    Review of Systems:   10 point ROS completed and was negative, except for pertinent positive and negatives stated in subjective.    Objective:   Vital signs:  Temp:  [97.7 °F (36.5 °C)-98.2 °F (36.8 °C)] 98.2 °F (36.8 °C)  Pulse:  [] 73  Resp:  [18-20] 18  BP: ()/(51-81) 117/56  SpO2:  [92 %-96 %] 96 %    Wt Readings from Last 6 Encounters:   10/08/24 185 lb 4.8 oz (84.1 kg)   24 187 lb (84.8 kg)   24 184 lb 11.9 oz (83.8 kg)   24 186 lb (84.4 kg)   24 191 lb (86.6 kg)   23 192 lb (87.1 kg)         Physical Exam:    General: No acute distress. Alert ,         Respiratory: Clear to auscultation bilaterally. No wheezes. No rhonchi.  Cardiovascular: S1, S2. Irregularily irregular, tachycardic  Abdomen: Soft, nontender, nondistended.  Positive bowel sounds. No rebound or guarding.  Neurologic: No focal neurological deficits.   Musculoskeletal: Moves all extremities.  Extremities: No edema.    Results:   Diagnostic Data:      Labs:    Labs Last 24 Hours:   BMP     CBC    Other     Na 139 Cl 104 BUN 10 Glu 119   Hb 10.4   PTT 57.2 Procal -   K 3.9 CO2 31.0 Cr 0.62   WBC 9.0 >< .0  INR - CRP -   Renal Lytes Endo    Hct 31.6   Trop - D dim -   eGFR - Ca 9.4 POC Gluc  -    LFT   pBNP - Lactic -   eGFR AA - PO4 - A1c -   AST - APk - Prot -  LDL -     Mg 1.6 TSH -   ALT - T lesly - Alb -        COVID-19 Lab Results    COVID-19  Lab Results   Component Value Date    COVID19 Not Detected 10/14/2022       Pro-Calcitonin  Recent Labs   Lab 10/06/24  0810   PCT 0.23*       Cardiac  Recent Labs   Lab 10/05/24  1500   PBNP 1,296*        Creatinine Kinase  No results for input(s): \"CK\" in the last 168 hours.    Inflammatory Markers  No results for input(s): \"CRP\", \"SONIA\", \"LDH\", \"DDIMER\" in the last 168 hours.    Imaging: Imaging data reviewed in Epic.    Medications:    metoprolol tartrate  25 mg Oral 2x Daily(Beta Blocker)    fluticasone-salmeterol  1 puff Inhalation BID    [Held by provider] dilTIAZem  30 mg Oral 4 times per day       Assessment & Plan:   ASSESSMENT / PLAN:         Paroxysmal Afib with RVR  -[id number] 3  -Cardiology on consult  -Started on Cardizem drip  -Heparin drip for AC--> transition to doac per cardiology  -TSH 1.245 on 9/26/24  -Tele monitoring  -post op a. Fib noted in August  -would be interested in watchman device  -was planning ELENO with cardioversion, but had desaturation on 10/7  -now s/p successful ELENO 10/8  -transition to eliquis tonight 10/8    SOB likely 2/2 worsening pulmonary edema  Persistent R pleural effusion  -CXR reviewed  -CT chest pending  -pBNP elevated- 1,296  -Recently completed course of Z-percy  -No wheezing on exam, hold off on steroids at this time  -monitor pulse ox  -hx. Of recent pneumothorax and bleb resection  -s/p vats on 8/12    Chronic respiratory failure  Hx of COPD  -Continue home inhalers  -Saturating well on 3-4L NC, baseline 2L  -Monitor pulse ox, supplement as indicated  -Hold off on steroids at this time    Obesity with NADJA  -BMI 33  -Counseled on making healthy lifestyle and dietary changes    Ch. Anemia  -check tibc     Hx. Breast Cancer  Hx. Of Mediastinal mass    Quality:  DVT Prophylaxis: hep gtt  CODE status: full  Das:    Central line: n/a  Dispo: further recs pending clinical course      Will the patient be referred to TCC on discharge?: yes  Estimated date of discharge: tbd  Discharge is dependent on: clinical improvement  At this point Ms. Levin is expected to be discharge to: home    Plan of care discussed with patient, nursing    Outpatient records or previous  hospital records reviewed.   Patient and/or patient's family given opportunity to ask questions and note understanding and agreeing with therapeutic plan as outlined     Coordinated care with providers and counseling re: treatment plan and workup    MDM: High complexity     JOSE DELGADO MD    Supplementary Documentation:

## 2024-10-08 NOTE — PROGRESS NOTES
Progress Note  Annmarie Levin Patient Status:  Inpatient    1945 MRN [id number]   Location [facility] 3W/SW Attending Felicia Mendez MD   Hosp Day # 3 PCP LLOYD ANDREWS MD     Subjective:  Pt denies complaint; getting ready for procedure this am; family at bedside    Objective:  BP 99/81 (BP Location: Left arm)   Pulse 87   Temp 97.7 °F (36.5 °C) (Axillary)   Resp 20   Ht 5' (1.524 m)   Wt 185 lb 4.8 oz (84.1 kg)   LMP 10/18/1991 (Approximate)   SpO2 94%   BMI 36.19 kg/m²     Telemetry: afib 80's-100's (120's w/activity)    Intake/Output:    Intake/Output Summary (Last 24 hours) at 10/8/2024 0720  Last data filed at 10/8/2024 0600  Gross per 24 hour   Intake 820 ml   Output 450 ml   Net 370 ml       Last 3 Weights   10/08/24 0557 185 lb 4.8 oz (84.1 kg)   10/07/24 0500 185 lb 1.6 oz (84 kg)   10/06/24 0500 182 lb 3.2 oz (82.6 kg)   10/05/24 2035 188 lb (85.3 kg)   10/05/24 1835 188 lb 8 oz (85.5 kg)   10/05/24 1638 184 lb 4.9 oz (83.6 kg)   10/05/24 1439 183 lb (83 kg)   24 1406 187 lb (84.8 kg)   24 0630 184 lb 11.9 oz (83.8 kg)   24 0005 189 lb 6.4 oz (85.9 kg)   24 1904 186 lb (84.4 kg)       Labs:  Recent Labs   Lab 10/05/24  1500 10/06/24  0810 10/07/24  0650   * 135* 142*   BUN 10 8* 11   CREATSERUM 0.63 0.61 0.71   EGFRCR 91 92 88   CA 9.1 8.8 9.7   * 137 135*   K 3.8 3.7 4.9    101 100   CO2 28.0 30.0 29.0     Recent Labs   Lab 10/05/24  1500 10/06/24  0810 10/07/24  0649   RBC 2.99* 2.83* 3.11*   HGB 10.0* 9.4* 10.2*   HCT 30.3* 28.3* 31.8*   .3* 100.0 102.3*   MCH 33.4 33.2 32.8   MCHC 33.0 33.2 32.1   RDW 15.0 14.8 14.9   NEPRELIM 9.30* 8.67* 7.31   WBC 11.5* 11.1* 10.2   .0* 437.0 560.0*         Recent Labs   Lab 10/05/24  1500   TROPHS 10       Diagnostics:  No results found.   Review of Systems   Constitutional: Negative.   Cardiovascular:  Negative for chest pain, dyspnea on exertion, leg swelling, orthopnea and  palpitations.   Respiratory:  Negative for cough and shortness of breath.        Physical Exam:    Gen: alert, oriented x 3, NAD  Heent: pupils equal, reactive. Mucous membranes moist.   Neck: no jvd  Cardiac: irregular rate and rhythm, normal S1,S2, no murmur, clicks, rub or gallop  Lungs: CTA  Abd: soft, NT/ND +bs  Ext: no edema  Skin: Warm, dry  Neuro: No focal deficits      Medications:     fluticasone-salmeterol  1 puff Inhalation BID    metoprolol tartrate  25 mg Oral 4x daily    dilTIAZem  30 mg Oral 4 times per day      continuous dose heparin 1,500 Units/hr (10/08/24 0603)    dilTIAZem 10 mg/hr (10/08/24 0200)     Assessment:  Paroxysmal Atrial Fibrillation, RVR  Had prev episode of PAF postop in 8/2024  Rates now improved on IV diltiazem gtt  On po lopressor 25mg q 4 hrs, po diltiazem 30mg q 6 hrs  S/P IV digoxin earlier in admit  Echo w/LVEF 50%, LA volume normal, PASP 35mmHg  TSH WNL  [id number] 3 - hx on asa only; now on IV heparin gtt  ELENO/Cardioversion Plan unable to be completed 10/7 d/t hypoxia; plan for ELENO/Cardioversion with anesthesia today  Recent Cough/Fever, R pleural Effusion  On po antibx   WBC, Procal mild elev  Elevated LFT's  Hx Recent Pneumothorax s/p VATS, Bleb Resection 8/2024  Hyperlipidemia -   Hx COPD  NADJA - CPAP  Hx Breast Cancer    Plan:  Continue IV diltiazem and IV heparin gtt  Decrease po metoprolol to 25mg po BID today  Plan for ELENO/Cardioversion with anesthesia today  Will plan for transition to DOAC post procedure     Plan of care discussed with patient, RN.    Jenniffer August, APRN  10/8/2024  7:20 AM  [phone] [facility]  [phone] [facility]        I saw and examined the patient agree the attached findings.  Plan for ELENO/DCCV today with anesthesia.    Aaron Geller MD  [facility] [place]

## 2024-10-09 VITALS
TEMPERATURE: 98 F | HEIGHT: 60 IN | WEIGHT: 186 LBS | RESPIRATION RATE: 19 BRPM | HEART RATE: 80 BPM | OXYGEN SATURATION: 97 % | DIASTOLIC BLOOD PRESSURE: 58 MMHG | SYSTOLIC BLOOD PRESSURE: 123 MMHG | BODY MASS INDEX: 36.52 KG/M2

## 2024-10-09 LAB
APTT PPP: 37.3 SECONDS (ref 23–36)
APTT PPP: 78.4 SECONDS (ref 23–36)
MAGNESIUM SERPL-MCNC: 1.8 MG/DL (ref 1.6–2.6)

## 2024-10-09 PROCEDURE — 99239 HOSP IP/OBS DSCHRG MGMT >30: CPT | Performed by: HOSPITALIST

## 2024-10-09 RX ORDER — METOPROLOL SUCCINATE 50 MG/1
50 TABLET, EXTENDED RELEASE ORAL
Status: DISCONTINUED | OUTPATIENT
Start: 2024-10-10 | End: 2024-10-09

## 2024-10-09 RX ORDER — FUROSEMIDE 20 MG
20 TABLET ORAL DAILY
Qty: 5 TABLET | Refills: 0 | Status: SHIPPED | OUTPATIENT
Start: 2024-10-09

## 2024-10-09 RX ORDER — FUROSEMIDE 20 MG
20 TABLET ORAL ONCE
Status: COMPLETED | OUTPATIENT
Start: 2024-10-09 | End: 2024-10-09

## 2024-10-09 RX ORDER — MAGNESIUM OXIDE 400 MG/1
400 TABLET ORAL ONCE
Status: COMPLETED | OUTPATIENT
Start: 2024-10-09 | End: 2024-10-09

## 2024-10-09 NOTE — DISCHARGE PLANNING
Patient was provided with discharge instructions, education, and follow up information. Patient's niece present for discharge instructions with patient's consent. Prescriptions were already sent electronically to patient's pharmacy. Patient verbalizes understanding of follow up information, specifically understanding of afib, stroke prevention, bleeding precautions, medications prescribed, when to take the next dose of each medication, following up with cardiology, signs and symptoms of when to call MD or EMS. Patient has no questions after reviewing all instructions and was taken down by wheelchair to go home with her neice.     Herminia PRICE, Discharge Leader [phone]

## 2024-10-09 NOTE — PLAN OF CARE
Patient alert & oriented x4. Heparin drip stopped. PO eliquis started. Patient updated on POC and verbalized understanding.    Problem: Patient Centered Care  Goal: Patient preferences are identified and integrated in the patient's plan of care  Description: Interventions:  - What would you like us to know as we care for you? I was recently admitted to [facility] in the PMU  - Provide timely, complete, and accurate information to patient/family  - Incorporate patient and family knowledge, values, beliefs, and cultural backgrounds into the planning and delivery of care  - Encourage patient/family to participate in care and decision-making at the level they choose  - Honor patient and family perspectives and choices  Outcome: Progressing     Problem: Patient/Family Goals  Goal: Patient/Family Long Term Goal  Description: Patient's Long Term Goal: To go home     Interventions:  - Monitor labs and vital signs  - Start PO anticoagulation  - Follow provider recommendations  - See additional Care Plan goals for specific interventions  Outcome: Progressing  Goal: Patient/Family Short Term Goal  Description: Patient's Short Term Goal: Decrease leg swelling    Interventions:   - PO lasix  - Follow provider recommendations  - See additional Care Plan goals for specific interventions  Outcome: Progressing     Problem: PAIN - ADULT  Goal: Verbalizes/displays adequate comfort level or patient's stated pain goal  Description: INTERVENTIONS:  - Encourage pt to monitor pain and request assistance  - Assess pain using appropriate pain scale  - Administer analgesics based on type and severity of pain and evaluate response  - Implement non-pharmacological measures as appropriate and evaluate response  - Consider cultural and social influences on pain and pain management  - Manage/alleviate anxiety  - Utilize distraction and/or relaxation techniques  - Monitor for opioid side effects  - Notify MD/LIP if interventions unsuccessful or  patient reports new pain  - Anticipate increased pain with activity and pre-medicate as appropriate  Outcome: Progressing     Problem: RESPIRATORY - ADULT  Goal: Achieves optimal ventilation and oxygenation  Description: INTERVENTIONS:  - Assess for changes in respiratory status  - Assess for changes in mentation and behavior  - Position to facilitate oxygenation and minimize respiratory effort  - Oxygen supplementation based on oxygen saturation or ABGs  - Provide Smoking Cessation handout, if applicable  - Encourage broncho-pulmonary hygiene including cough, deep breathe, Incentive Spirometry  - Assess the need for suctioning and perform as needed  - Assess and instruct to report SOB or any respiratory difficulty  - Respiratory Therapy support as indicated  - Manage/alleviate anxiety  - Monitor for signs/symptoms of CO2 retention  Outcome: Progressing

## 2024-10-09 NOTE — CM/SW NOTE
Received MDO for [employer] coverage. SHRUTHI confirmed Rx sent via e-script to pt's pharmacy.     SHRUTHI/ASHLEY contacted pt's [facility] via phone #: [phone] and spoke to Addie. Per Addie, pt's OOP cost is $153.08/month.     Addie confirmed pt is currently in the Medicare coverage gap period.    SHRUTHI provided Free 30 Day coupon information to Addie - coupon processed and confirmed $0 copay for first month.    Free 30 day coupon placed on pt's chart to bring w/ her to pharmacy when she picks up her Rx.    ALBERT Guillen and DC RN Herminia updated.      Tamiko, MSW, LSW [phone]

## 2024-10-09 NOTE — PROGRESS NOTES
[facility] Cardiology Progress Note    Annmarie Levin Patient Status:  Inpatient    1945 MRN [id number]   Location [facility] 3W/SW Attending Seema Estrada MD   Hosp Day # 4 PCP LLOYD ANDREWS MD     Subjective:  Denies cp, sob, wood, orthopnea, palpitations     Objective:  /89 (BP Location: Left arm)   Pulse 72   Temp 98.2 °F (36.8 °C) (Axillary)   Resp 19   Ht 152.4 cm (5')   Wt 186 lb (84.4 kg)   LMP 10/18/1991 (Approximate)   SpO2 97%   BMI 36.33 kg/m²     Telemetry: NSR       Intake/Output:    Intake/Output Summary (Last 24 hours) at 10/9/2024 0944  Last data filed at 10/9/2024 0910  Gross per 24 hour   Intake 976 ml   Output 500 ml   Net 476 ml       Last 3 Weights   10/09/24 0553 186 lb (84.4 kg)   10/08/24 0557 185 lb 4.8 oz (84.1 kg)   10/07/24 0500 185 lb 1.6 oz (84 kg)   10/06/24 0500 182 lb 3.2 oz (82.6 kg)   10/05/24 2035 188 lb (85.3 kg)   10/05/24 1835 188 lb 8 oz (85.5 kg)   10/05/24 1638 184 lb 4.9 oz (83.6 kg)   10/05/24 1439 183 lb (83 kg)   24 1406 187 lb (84.8 kg)   24 0630 184 lb 11.9 oz (83.8 kg)   24 0005 189 lb 6.4 oz (85.9 kg)   24 1904 186 lb (84.4 kg)       Labs:  Recent Labs   Lab 10/06/24  0810 10/07/24  0650 10/08/24  0846   * 142* 119*   BUN 8* 11 10   CREATSERUM 0.61 0.71 0.62   EGFRCR 92 88 92   CA 8.8 9.7 9.4    135* 139   K 3.7 4.9 3.9    100 104   CO2 30.0 29.0 31.0     Recent Labs   Lab 10/06/24  0810 10/07/24  0649 10/08/24  0846   RBC 2.83* 3.11* 3.09*   HGB 9.4* 10.2* 10.4*   HCT 28.3* 31.8* 31.6*   .0 102.3* 102.3*   MCH 33.2 32.8 33.7   MCHC 33.2 32.1 32.9   RDW 14.8 14.9 14.9   NEPRELIM 8.67* 7.31 6.41   WBC 11.1* 10.2 9.0   .0 560.0* 558.0*         Recent Labs   Lab 10/05/24  1500   TROPHS 10       Diagnostics:     24 Echo  1. Left ventricle: The cavity size was normal. Wall thickness was mildly      increased. Systolic function was mildly reduced. The estimated  ejection      fraction was 50%, by visual assessment. No diagnostic evidence for      regional wall motion abnormalities. Unable to assess LV diastolic      function due to heart rhythm.   2. Right ventricle: Systolic pressure was at the upper limits of normal.   3. Left atrium: The left atrial volume was normal.   4. Ascending aorta: The ascending aorta was mildly dilated.   5. Pulmonary arteries: Systolic pressure was at the upper limits of normal,      estimated to be 35mm Hg.   Impressions:  No previous study was available for comparison.     Review of Systems   Respiratory: Negative.  Negative for cough and shortness of breath.    Cardiovascular:  Positive for leg swelling. Negative for chest pain, palpitations and orthopnea.     Physical Exam:    General: Alert and oriented x 3. No apparent distress.   HEENT: Normocephalic, anicteric sclera, neck supple, no thyromegaly or adenopathy.  Neck: No JVD, carotids 2+, no bruits.  Cardiac: Regular rate & rhythm. S1, S2 normal. No murmur, pericardial rub, S3, or extra cardiac sounds.  Lungs: Clear without wheezes, rales, rhonchi or dullness.  Normal excursions and effort.  Abdomen: Soft, non-tender. No organosplenomegally, mass or rebound, BS-present.  Extremities: Without clubbing or cyanosis.  Mild BLE edema R > L   Neurologic: Alert and oriented, normal affect. No focal defects  Skin: Warm and dry.       Medications:   metoprolol tartrate  25 mg Oral 2x Daily(Beta Blocker)    fluticasone-salmeterol  1 puff Inhalation BID    [Held by provider] dilTIAZem  30 mg Oral 4 times per day      continuous dose heparin 1,350 Units/hr (10/09/24 0712)       Assessment:    Paroxysmal Atrial Fibrillation, RVR  Had prev episode of PAF postop in 8/2024  Echo w/LVEF 50%, LA volume normal, PASP 35mmHg  TSH WNL  [id number] 3 - on iv heparin   Off dilt gtt. On lopressor 25mg bid   10/8 s/p successful ELENO/DCCV   Recent Cough/Fever, R pleural Effusion  Recently completed course of zpak     Per Pulm ,  apical bulla or ptx actually does not have fluid like before. Chronic in nature and s/p R sided VATS pleurodesis.   Elevated LFT's  Hx Recent Pneumothorax s/p VATS, Bleb Resection 8/2024  Hyperlipidemia -   Hx COPD  NADJA - CPAP  Hx Breast Cancer    Plan:    S/p successful julio / dccv yesterday. Remains in sinus rhythm   Dc heparin gtt. Switch to eliquis 5mg bid   Switch lopressor 25mg bid to toprol xl 50mg daily   Mild LE edema appreciated. Will send her home on lasix 20mg po x 5 days  Ok to discharge from Cardiology standpoint. F/u with Dr. Hatch or AMBER In 2 weeks     AMBER Ibanez  10/9/2024  9:44 AM  Ph [phone] ([place])  Ph [phone] ([place])      L3  Seen and examined bedside. Agree with the present management, will sign off.  S/p successful julio / dccv yesterday. Remains in sinus rhythm   Dc heparin gtt. Switch to eliquis 5mg bid and switch lopressor 25mg bid to toprol xl 50mg daily       Thank you for allowing me to participate in the care of your patient, feel free to contact me if you have any questions.    Gatito Hatch MD  [facility]  Interventional Cardiology.  [phone]

## 2024-10-09 NOTE — DISCHARGE SUMMARY
[facility]  part of [facility]    Discharge Summary    Annmarie Levin Patient Status:  Inpatient    1945 MRN [id number]   Location [facility] 3W/SW Attending Seema Estrada MD   Hosp Day # 4 PCP LLOYD ANDREWS MD     Date of Admission: 10/5/2024      Date of Discharge: 10/09/24      Admitting Diagnosis: Atrial fibrillation with RVR (HCC) [I48.91]    Hospital Discharge Diagnoses:  Afib rvr    Lace+ Score: 77  59-90 High Risk  29-58 Medium Risk  0-28   Low Risk.    TCM Follow-Up Recommendation:  LACE > 58: High Risk of readmission after discharge from the hospital.          Problem List:   Patient Active Problem List   Diagnosis    Malignant neoplasm of upper-outer quadrant of right breast in female, estrogen receptor negative (HCC)    Osteopenia of multiple sites    DJD (degenerative joint disease)    GERD (gastroesophageal reflux disease)    Right carpal tunnel syndrome    Chronic right shoulder pain    Impingement syndrome of right shoulder    Arthritis of shoulder region    Adult BMI 45.0-49.9 kg/sq m (HCC)    Sleep apnea    Soft tissue mass    Status post mastectomy, right    Nontraumatic tear of right rotator cuff    Mixed hyperlipidemia    Fatigue    Acute pain of right knee    Mediastinal mass    Pneumothorax, unspecified type    Atrial fibrillation with RVR (HCC)         Physical Exam:     Gen: No acute distress  Pulm: Lungs clear, normal respiratory effort  CV: Heart with regular rate and rhythm  Abd: Abdomen soft, nontender, nondistended, bowel sounds present  Neuro: No acute focal deficits  MSK: moves extremities  Skin: Warm and dry  Psych: Normal affect  Ext: no c/c/e      History of Present Illness: Per Dr Cain    Annmarie Levin is a 78 year old female with a past medical history of COPD, breast cancer s/p mastectomy/radiation and recent admission for COPD exacerbation and pneumothorax presented to the ER with complaints of SOB and palpitations that  began earlier this afternoon. The patient stated she was previously diagnosed with afib and stated she felt the same way at that time.   Of note, the patient was recently diagnosed with possible PNA and was started on a zpak, the course of which she completed yesterday.   She denied any other complaints at the time of interview.     Hospital Course:     Paroxysmal Afib with RVR  -[id number] 3  -Cardiology on consult  -Started on Cardizem drip - now stopped  -Heparin drip for AC--> transition to doac per cardiology - home on eliquis  -TSH 1.245 on 9/26/24  -post op a. Fib noted in August  -would be interested in watchman device  -was planning ELENO with cardioversion, but had desaturation on 10/7  -now s/p successful ELENO 10/8       SOB likely 2/2 worsening pulmonary edema  Persistent R pleural effusion  -CXR reviewed  -CT chest reviewed - fu as outpt  -pBNP elevated- 1,296  -Recently completed course of Z-percy  -No wheezing on exam, hold off on steroids at this time  -monitor pulse ox  -hx. Of recent pneumothorax and bleb resection  -s/p vats on 8/12  -home on oral lasix per cards     Acute hypoxemic respiratory failure on Chronic respiratory failure  Hx of COPD  -Continue home inhalers  -Saturating well on 3-4L NC, baseline 2L  -Monitor pulse ox, supplement as indicated  -Hold off on steroids at this time     Obesity with NADJA  -BMI 33  -Counseled on making healthy lifestyle and dietary changes     Ch. Anemia  -fu as outpt     Hx. Breast Cancer  Hx. Of Mediastinal mass   -fu as outpt    Discharge Condition: Stable    Discharge Medications:      Discharge Medications        START taking these medications        Instructions Prescription details   apixaban 5 MG Tabs  Commonly known as: Eliquis      Take 1 tablet (5 mg total) by mouth 2 (two) times daily.   Quantity: 60 tablet  Refills: 1     furosemide 20 MG Tabs  Commonly known as: Lasix      Take 1 tablet (20 mg total) by mouth daily.   Quantity: 5 tablet  Refills: 0             CHANGE how you take these medications        Instructions Prescription details   Gemtesa 75 MG Tabs  Generic drug: Vibegron  What changed: Another medication with the same name was removed. Continue taking this medication, and follow the directions you see here.      Take 1 tablet by mouth daily.   Refills: 0     multivitamin Tabs  What changed: Another medication with the same name was removed. Continue taking this medication, and follow the directions you see here.      Take 1 tablet by mouth daily with breakfast.   Refills: 0            CONTINUE taking these medications        Instructions Prescription details   albuterol 108 (90 Base) MCG/ACT Aers  Commonly known as: Ventolin HFA      Inhale 2 puffs into the lungs every 4 (four) hours as needed.   Refills: 0     Calcium Carbonate-Vitamin D 600-200 MG-UNIT Caps      Take  by mouth.   Refills: 0     cyanocobalamin 500 MCG Tabs  Commonly known as: Vitamin B12      Take 1 tablet (500 mcg total) by mouth daily.   Refills: 0     estradiol 0.1 MG/GM Crea  Commonly known as: Estrace      Apply 1/2 gram vaginally 2 times per week. Use at bedtime.   Quantity: 42.5 g  Refills: 3     fluticasone propionate 50 MCG/ACT Susp  Commonly known as: Flonase      2 sprays by Nasal route daily.   Refills: 0     LUVENA PREBIOTIC LUBRICANT VA      Place  vaginally. Indications: One every four days, per pt's medication list   Refills: 0     metoprolol succinate ER 50 MG Tb24  Commonly known as: Toprol XL      Take 1 tablet (50 mg total) by mouth Daily Beta Blocker.   Quantity: 30 tablet  Refills: 1     montelukast 10 MG Tabs  Commonly known as: Singulair      Take by mouth.   Refills: 0     Omeprazole 40 MG Cpdr      Take 1 capsule (40 mg total) by mouth daily.   Refills: 0     PATIENT SUPPLIED MEDICATION      2L of O2 as needed   Refills: 0     Symbicort 160-4.5 MCG/ACT Aero  Generic drug: Budesonide-Formoterol Fumarate      Inhale 2 puffs into the lungs 2 (two) times daily.    Refills: 0     tiotropium 18 MCG Caps  Commonly known as: Spiriva Handihaler      Inhale into the lungs daily.   Refills: 0     Vitamin D 50 MCG (2000 UT) Caps      Take 1 capsule (2,000 Units total) by mouth daily.   Refills: 0            STOP taking these medications      aspirin 325 MG Tabs                  Where to Get Your Medications        These medications were sent to [facility]/pharmacy #[id number] - [place], [place] - [address] [place] [address] AT across from Srav[facility] [address], [phone], [phone]  [address] [place] [address]      Phone: [phone]   apixaban 5 MG Tabs  furosemide 20 MG Tabs             Seema Estrada MD  10/9/2024  2:20 PM    Greater than 30 minutes spent on preparation and coordination of this discharge

## 2024-10-09 NOTE — PLAN OF CARE
No complaints overnight. Remains on heparin gtt. SBA w/ walker. Plan to transition to Eliquis today pending price    Call light within reach, safety precautions in place  Problem: Patient Centered Care  Goal: Patient preferences are identified and integrated in the patient's plan of care  Description: Interventions:  - What would you like us to know as we care for you? I was recently admitted to [facility] in the [facility]  - Provide timely, complete, and accurate information to patient/family  - Incorporate patient and family knowledge, values, beliefs, and cultural backgrounds into the planning and delivery of care  - Encourage patient/family to participate in care and decision-making at the level they choose  - Honor patient and family perspectives and choices  Outcome: Progressing     Problem: PAIN - ADULT  Goal: Verbalizes/displays adequate comfort level or patient's stated pain goal  Description: INTERVENTIONS:  - Encourage pt to monitor pain and request assistance  - Assess pain using appropriate pain scale  - Administer analgesics based on type and severity of pain and evaluate response  - Implement non-pharmacological measures as appropriate and evaluate response  - Consider cultural and social influences on pain and pain management  - Manage/alleviate anxiety  - Utilize distraction and/or relaxation techniques  - Monitor for opioid side effects  - Notify MD/LIP if interventions unsuccessful or patient reports new pain  - Anticipate increased pain with activity and pre-medicate as appropriate  Outcome: Progressing     Problem: RESPIRATORY - ADULT  Goal: Achieves optimal ventilation and oxygenation  Description: INTERVENTIONS:  - Assess for changes in respiratory status  - Assess for changes in mentation and behavior  - Position to facilitate oxygenation and minimize respiratory effort  - Oxygen supplementation based on oxygen saturation or ABGs  - Provide Smoking Cessation handout, if applicable  - Encourage  broncho-pulmonary hygiene including cough, deep breathe, Incentive Spirometry  - Assess the need for suctioning and perform as needed  - Assess and instruct to report SOB or any respiratory difficulty  - Respiratory Therapy support as indicated  - Manage/alleviate anxiety  - Monitor for signs/symptoms of CO2 retention  Outcome: Progressing

## 2024-10-10 ENCOUNTER — APPOINTMENT (OUTPATIENT)
Dept: CARDIAC REHAB | Facility: HOSPITAL | Age: 79
End: 2024-10-10
Attending: INTERNAL MEDICINE
Payer: MEDICARE

## 2024-10-18 DIAGNOSIS — M81.0 POSTMENOPAUSAL OSTEOPOROSIS: Primary | ICD-10-CM

## 2024-10-22 ENCOUNTER — CARDPULM VISIT (OUTPATIENT)
Dept: CARDIAC REHAB | Facility: HOSPITAL | Age: 79
End: 2024-10-22
Attending: INTERNAL MEDICINE
Payer: MEDICARE

## 2024-10-22 ENCOUNTER — HOSPITAL ENCOUNTER (OUTPATIENT)
Dept: BONE DENSITY | Age: 79
Discharge: HOME | End: 2024-10-22
Attending: INTERNAL MEDICINE
Payer: MEDICARE

## 2024-10-22 DIAGNOSIS — M81.0 POSTMENOPAUSAL OSTEOPOROSIS: ICD-10-CM

## 2024-10-22 PROCEDURE — 77080 DXA BONE DENSITY AXIAL: CPT | Performed by: INTERNAL MEDICINE

## 2024-10-22 PROCEDURE — 94625 PHY/QHP OP PULM RHB W/O MNTR: CPT

## 2024-10-24 ENCOUNTER — CARDPULM VISIT (OUTPATIENT)
Dept: CARDIAC REHAB | Facility: HOSPITAL | Age: 79
End: 2024-10-24
Attending: INTERNAL MEDICINE
Payer: MEDICARE

## 2024-10-24 PROCEDURE — 94625 PHY/QHP OP PULM RHB W/O MNTR: CPT

## 2024-10-29 ENCOUNTER — CARDPULM VISIT (OUTPATIENT)
Dept: CARDIAC REHAB | Facility: HOSPITAL | Age: 79
End: 2024-10-29
Attending: INTERNAL MEDICINE
Payer: MEDICARE

## 2024-10-29 PROCEDURE — 94625 PHY/QHP OP PULM RHB W/O MNTR: CPT

## 2024-10-31 ENCOUNTER — APPOINTMENT (OUTPATIENT)
Dept: CARDIAC REHAB | Facility: HOSPITAL | Age: 79
End: 2024-10-31
Attending: INTERNAL MEDICINE
Payer: MEDICARE

## 2024-11-05 ENCOUNTER — CARDPULM VISIT (OUTPATIENT)
Dept: CARDIAC REHAB | Facility: HOSPITAL | Age: 79
End: 2024-11-05
Attending: INTERNAL MEDICINE
Payer: MEDICARE

## 2024-11-07 ENCOUNTER — APPOINTMENT (OUTPATIENT)
Dept: CARDIAC REHAB | Facility: HOSPITAL | Age: 79
End: 2024-11-07
Attending: INTERNAL MEDICINE
Payer: MEDICARE

## 2024-11-12 ENCOUNTER — APPOINTMENT (OUTPATIENT)
Dept: CARDIAC REHAB | Facility: HOSPITAL | Age: 79
End: 2024-11-12
Attending: INTERNAL MEDICINE
Payer: MEDICARE

## 2024-11-14 ENCOUNTER — APPOINTMENT (OUTPATIENT)
Dept: CARDIAC REHAB | Facility: HOSPITAL | Age: 79
End: 2024-11-14
Attending: INTERNAL MEDICINE
Payer: MEDICARE

## 2024-11-19 ENCOUNTER — APPOINTMENT (OUTPATIENT)
Dept: CARDIAC REHAB | Facility: HOSPITAL | Age: 79
End: 2024-11-19
Attending: INTERNAL MEDICINE
Payer: MEDICARE

## 2024-11-21 ENCOUNTER — APPOINTMENT (OUTPATIENT)
Dept: CARDIAC REHAB | Facility: HOSPITAL | Age: 79
End: 2024-11-21
Attending: INTERNAL MEDICINE
Payer: MEDICARE

## 2024-11-26 ENCOUNTER — APPOINTMENT (OUTPATIENT)
Dept: CARDIAC REHAB | Facility: HOSPITAL | Age: 79
End: 2024-11-26
Attending: INTERNAL MEDICINE
Payer: MEDICARE

## 2024-12-03 ENCOUNTER — TELEPHONE (OUTPATIENT)
Dept: UROLOGY | Facility: HOSPITAL | Age: 79
End: 2024-12-03

## 2024-12-03 DIAGNOSIS — N39.41 URGE URINARY INCONTINENCE: Primary | ICD-10-CM

## 2024-12-03 RX ORDER — VIBEGRON 75 MG/1
1 TABLET, FILM COATED ORAL DAILY
Qty: 180 TABLET | Refills: 3 | Status: SHIPPED | OUTPATIENT
Start: 2024-12-03 | End: 2025-03-03

## 2024-12-03 NOTE — TELEPHONE ENCOUNTER
Fax received from [facility] with refill request for Gemtesa.  Spoke to patient to verify that she needs the refill, and to clarify which pharmacy as she has a local pharmacy, and pharmacy in [place] listed.  Per Jojo Rogers's PA-C last note, pt is to continue Gemtesa and follow up in 1 year.  Refill request sent.  Pt encouraged to call us for any further questions or concerns.

## 2025-01-13 ENCOUNTER — TELEPHONE (OUTPATIENT)
Dept: UROLOGY | Facility: HOSPITAL | Age: 80
End: 2025-01-13

## 2025-01-13 DIAGNOSIS — N95.2 POSTMENOPAUSAL ATROPHIC VAGINITIS: Primary | ICD-10-CM

## 2025-01-13 RX ORDER — ESTRADIOL 0.1 MG/G
CREAM VAGINAL
Qty: 42.5 G | Refills: 3 | Status: SHIPPED | OUTPATIENT
Start: 2025-01-13

## 2025-01-13 NOTE — TELEPHONE ENCOUNTER
IC from patient asking for refill of estradiol.  Per providers's last note, pt is to continue using this twice a week.  Pt's preferred pharmacy verified and prescription sent.  Pt transferred to  to make appt, per her request.  Encouraged to call with any further questions or concerns.

## 2025-06-22 DIAGNOSIS — Z12.31 ENCOUNTER FOR SCREENING MAMMOGRAM FOR MALIGNANT NEOPLASM OF BREAST: Primary | ICD-10-CM

## 2025-06-23 ENCOUNTER — CARDPULM VISIT (OUTPATIENT)
Dept: CARDIAC REHAB | Facility: HOSPITAL | Age: 80
End: 2025-06-23
Attending: INTERNAL MEDICINE
Payer: MEDICARE

## 2025-06-27 DIAGNOSIS — M81.0 SENILE OSTEOPOROSIS: Primary | ICD-10-CM

## 2025-06-27 RX ORDER — ZOLEDRONIC ACID 0.05 MG/ML
5 INJECTION, SOLUTION INTRAVENOUS ONCE
OUTPATIENT
Start: 2025-06-27

## 2025-07-03 ENCOUNTER — CARDPULM VISIT (OUTPATIENT)
Dept: CARDIAC REHAB | Facility: HOSPITAL | Age: 80
End: 2025-07-03
Attending: INTERNAL MEDICINE
Payer: MEDICARE

## 2025-07-03 PROCEDURE — 94625 PHY/QHP OP PULM RHB W/O MNTR: CPT

## 2025-07-08 ENCOUNTER — APPOINTMENT (OUTPATIENT)
Dept: CARDIAC REHAB | Facility: HOSPITAL | Age: 80
End: 2025-07-08
Attending: INTERNAL MEDICINE

## 2025-07-09 ENCOUNTER — LAB ENCOUNTER (OUTPATIENT)
Dept: LAB | Facility: HOSPITAL | Age: 80
End: 2025-07-09
Attending: INTERNAL MEDICINE
Payer: MEDICARE

## 2025-07-09 DIAGNOSIS — N30.90 DIVERTICULITIS OF BLADDER: Primary | ICD-10-CM

## 2025-07-09 DIAGNOSIS — E61.1 IRON DEFICIENCY: Primary | ICD-10-CM

## 2025-07-09 DIAGNOSIS — R53.83 OTHER FATIGUE: ICD-10-CM

## 2025-07-09 DIAGNOSIS — M81.0 SENILE OSTEOPOROSIS: ICD-10-CM

## 2025-07-09 LAB
ALBUMIN SERPL-MCNC: 4.4 G/DL (ref 3.2–4.8)
ALBUMIN/GLOB SERPL: 1.6 {RATIO} (ref 1–2)
ALP LIVER SERPL-CCNC: 64 U/L (ref 55–142)
ALT SERPL-CCNC: 14 U/L (ref 10–49)
ANION GAP SERPL CALC-SCNC: 3 MMOL/L (ref 0–18)
AST SERPL-CCNC: 24 U/L (ref ?–34)
BASOPHILS # BLD AUTO: 0.05 X10(3) UL (ref 0–0.2)
BASOPHILS NFR BLD AUTO: 0.8 %
BILIRUB SERPL-MCNC: 0.7 MG/DL (ref 0.2–1.1)
BILIRUB UR QL: NEGATIVE
BUN BLD-MCNC: 12 MG/DL (ref 9–23)
BUN/CREAT SERPL: 14.5 (ref 10–20)
CALCIUM BLD-MCNC: 9.7 MG/DL (ref 8.7–10.4)
CHLORIDE SERPL-SCNC: 96 MMOL/L (ref 98–112)
CLARITY UR: CLEAR
CO2 SERPL-SCNC: 33 MMOL/L (ref 21–32)
COLOR UR: YELLOW
CREAT BLD-MCNC: 0.83 MG/DL (ref 0.55–1.02)
DEPRECATED RDW RBC AUTO: 53.1 FL (ref 35.1–46.3)
EGFRCR SERPLBLD CKD-EPI 2021: 72 ML/MIN/1.73M2 (ref 60–?)
EOSINOPHIL # BLD AUTO: 0.09 X10(3) UL (ref 0–0.7)
EOSINOPHIL NFR BLD AUTO: 1.4 %
ERYTHROCYTE [DISTWIDTH] IN BLOOD BY AUTOMATED COUNT: 14.6 % (ref 11–15)
FASTING STATUS PATIENT QL REPORTED: YES
GLOBULIN PLAS-MCNC: 2.8 G/DL (ref 2–3.5)
GLUCOSE BLD-MCNC: 134 MG/DL (ref 70–99)
GLUCOSE UR-MCNC: NORMAL MG/DL
HCT VFR BLD AUTO: 35.2 % (ref 35–48)
HGB BLD-MCNC: 11.4 G/DL (ref 12–16)
HGB UR QL STRIP.AUTO: NEGATIVE
IMM GRANULOCYTES # BLD AUTO: 0.01 X10(3) UL (ref 0–1)
IMM GRANULOCYTES NFR BLD: 0.2 %
KETONES UR-MCNC: NEGATIVE MG/DL
LEUKOCYTE ESTERASE UR QL STRIP.AUTO: NEGATIVE
LYMPHOCYTES # BLD AUTO: 0.87 X10(3) UL (ref 1–4)
LYMPHOCYTES NFR BLD AUTO: 13.2 %
MCH RBC QN AUTO: 32.4 PG (ref 26–34)
MCHC RBC AUTO-ENTMCNC: 32.4 G/DL (ref 31–37)
MCV RBC AUTO: 100 FL (ref 80–100)
MONOCYTES # BLD AUTO: 1.2 X10(3) UL (ref 0.1–1)
MONOCYTES NFR BLD AUTO: 18.2 %
NEUTROPHILS # BLD AUTO: 4.36 X10 (3) UL (ref 1.5–7.7)
NEUTROPHILS # BLD AUTO: 4.36 X10(3) UL (ref 1.5–7.7)
NEUTROPHILS NFR BLD AUTO: 66.2 %
NITRITE UR QL STRIP.AUTO: NEGATIVE
OSMOLALITY SERPL CALC.SUM OF ELEC: 276 MOSM/KG (ref 275–295)
PH UR: 5.5 [PH] (ref 5–8)
PLATELET # BLD AUTO: 370 10(3)UL (ref 150–450)
POTASSIUM SERPL-SCNC: 4.1 MMOL/L (ref 3.5–5.1)
PROT SERPL-MCNC: 7.2 G/DL (ref 5.7–8.2)
RBC # BLD AUTO: 3.52 X10(6)UL (ref 3.8–5.3)
SODIUM SERPL-SCNC: 132 MMOL/L (ref 136–145)
SP GR UR STRIP: 1.02 (ref 1–1.03)
TSI SER-ACNC: 1.14 UIU/ML (ref 0.55–4.78)
UROBILINOGEN UR STRIP-ACNC: NORMAL
WBC # BLD AUTO: 6.6 X10(3) UL (ref 4–11)

## 2025-07-09 PROCEDURE — 85025 COMPLETE CBC W/AUTO DIFF WBC: CPT

## 2025-07-09 PROCEDURE — 84443 ASSAY THYROID STIM HORMONE: CPT

## 2025-07-09 PROCEDURE — 81003 URINALYSIS AUTO W/O SCOPE: CPT

## 2025-07-09 PROCEDURE — 36415 COLL VENOUS BLD VENIPUNCTURE: CPT

## 2025-07-09 PROCEDURE — 80053 COMPREHEN METABOLIC PANEL: CPT

## 2025-07-10 ENCOUNTER — LAB ENCOUNTER (OUTPATIENT)
Dept: LAB | Facility: HOSPITAL | Age: 80
End: 2025-07-10
Attending: INTERNAL MEDICINE
Payer: MEDICARE

## 2025-07-10 DIAGNOSIS — E61.1 IRON DEFICIENCY: ICD-10-CM

## 2025-07-10 DIAGNOSIS — M81.0 SENILE OSTEOPOROSIS: ICD-10-CM

## 2025-07-10 LAB
ALBUMIN SERPL-MCNC: 4.4 G/DL (ref 3.2–4.8)
CALCIUM BLD-MCNC: 9.6 MG/DL (ref 8.7–10.4)
CREAT BLD-MCNC: 0.8 MG/DL (ref 0.55–1.02)
EGFRCR SERPLBLD CKD-EPI 2021: 75 ML/MIN/1.73M2 (ref 60–?)
IRON SATN MFR SERPL: 16 % (ref 15–50)
IRON SERPL-MCNC: 46 UG/DL (ref 50–170)
TOTAL IRON BINDING CAPACITY: 293 UG/DL (ref 250–425)
TRANSFERRIN SERPL-MCNC: 217 MG/DL (ref 250–380)

## 2025-07-10 PROCEDURE — 82565 ASSAY OF CREATININE: CPT

## 2025-07-10 PROCEDURE — 82040 ASSAY OF SERUM ALBUMIN: CPT

## 2025-07-10 PROCEDURE — 83540 ASSAY OF IRON: CPT

## 2025-07-10 PROCEDURE — 82310 ASSAY OF CALCIUM: CPT

## 2025-07-10 PROCEDURE — 84466 ASSAY OF TRANSFERRIN: CPT

## 2025-07-10 PROCEDURE — 36415 COLL VENOUS BLD VENIPUNCTURE: CPT

## 2025-07-11 ENCOUNTER — APPOINTMENT (OUTPATIENT)
Facility: LOCATION | Age: 80
End: 2025-07-11
Attending: INTERNAL MEDICINE

## 2025-07-14 DIAGNOSIS — Z20.1 EXPOSURE TO TB: ICD-10-CM

## 2025-07-14 DIAGNOSIS — R05.9 COUGH IN ADULT: Primary | ICD-10-CM

## 2025-07-15 ENCOUNTER — LAB ENCOUNTER (OUTPATIENT)
Dept: LAB | Facility: HOSPITAL | Age: 80
End: 2025-07-15
Attending: INTERNAL MEDICINE
Payer: MEDICARE

## 2025-07-15 ENCOUNTER — HOSPITAL ENCOUNTER (OUTPATIENT)
Dept: GENERAL RADIOLOGY | Facility: HOSPITAL | Age: 80
Discharge: HOME | End: 2025-07-15
Attending: INTERNAL MEDICINE
Payer: MEDICARE

## 2025-07-15 ENCOUNTER — CARDPULM VISIT (OUTPATIENT)
Dept: CARDIAC REHAB | Facility: HOSPITAL | Age: 80
End: 2025-07-15
Attending: INTERNAL MEDICINE
Payer: MEDICARE

## 2025-07-15 DIAGNOSIS — Z20.1 EXPOSURE TO TB: ICD-10-CM

## 2025-07-15 DIAGNOSIS — R05.9 COUGH IN ADULT: ICD-10-CM

## 2025-07-15 PROCEDURE — 71046 X-RAY EXAM CHEST 2 VIEWS: CPT | Performed by: INTERNAL MEDICINE

## 2025-07-15 PROCEDURE — 94625 PHY/QHP OP PULM RHB W/O MNTR: CPT

## 2025-07-15 PROCEDURE — 86480 TB TEST CELL IMMUN MEASURE: CPT

## 2025-07-15 PROCEDURE — 36415 COLL VENOUS BLD VENIPUNCTURE: CPT

## 2025-07-17 ENCOUNTER — CARDPULM VISIT (OUTPATIENT)
Dept: CARDIAC REHAB | Facility: HOSPITAL | Age: 80
End: 2025-07-17
Attending: INTERNAL MEDICINE
Payer: MEDICARE

## 2025-07-17 LAB
M TB IFN-G CD4+ T-CELLS BLD-ACNC: 0.1 IU/ML
M TB TUBERC IFN-G BLD QL: NEGATIVE
M TB TUBERC IGNF/MITOGEN IGNF CONTROL: 2.06 IU/ML
QFT TB1 AG MINUS NIL: -0.01 IU/ML
QFT TB2 AG MINUS NIL: -0.02 IU/ML

## 2025-07-17 PROCEDURE — 94625 PHY/QHP OP PULM RHB W/O MNTR: CPT

## 2025-07-22 ENCOUNTER — CARDPULM VISIT (OUTPATIENT)
Dept: CARDIAC REHAB | Facility: HOSPITAL | Age: 80
End: 2025-07-22
Attending: INTERNAL MEDICINE
Payer: MEDICARE

## 2025-07-22 PROCEDURE — 94625 PHY/QHP OP PULM RHB W/O MNTR: CPT

## 2025-07-29 ENCOUNTER — APPOINTMENT (OUTPATIENT)
Dept: CARDIAC REHAB | Facility: HOSPITAL | Age: 80
End: 2025-07-29
Attending: INTERNAL MEDICINE

## 2025-07-29 ENCOUNTER — CARDPULM VISIT (OUTPATIENT)
Dept: CARDIAC REHAB | Facility: HOSPITAL | Age: 80
End: 2025-07-29
Attending: INTERNAL MEDICINE
Payer: MEDICARE

## 2025-07-29 PROCEDURE — 94625 PHY/QHP OP PULM RHB W/O MNTR: CPT

## 2025-07-31 ENCOUNTER — CARDPULM VISIT (OUTPATIENT)
Dept: CARDIAC REHAB | Facility: HOSPITAL | Age: 80
End: 2025-07-31
Attending: INTERNAL MEDICINE
Payer: MEDICARE

## 2025-07-31 ENCOUNTER — HOSPITAL ENCOUNTER (OUTPATIENT)
Dept: MAMMOGRAPHY | Facility: HOSPITAL | Age: 80
Discharge: HOME | End: 2025-07-31
Attending: INTERNAL MEDICINE

## 2025-07-31 DIAGNOSIS — Z12.31 ENCOUNTER FOR SCREENING MAMMOGRAM FOR MALIGNANT NEOPLASM OF BREAST: ICD-10-CM

## 2025-07-31 PROCEDURE — 77063 BREAST TOMOSYNTHESIS BI: CPT | Performed by: INTERNAL MEDICINE

## 2025-07-31 PROCEDURE — 94625 PHY/QHP OP PULM RHB W/O MNTR: CPT

## 2025-07-31 PROCEDURE — 77067 SCR MAMMO BI INCL CAD: CPT | Performed by: INTERNAL MEDICINE

## 2025-08-04 ENCOUNTER — OFFICE VISIT (OUTPATIENT)
Dept: UROLOGY | Facility: HOSPITAL | Age: 80
End: 2025-08-04
Attending: PHYSICIAN ASSISTANT

## 2025-08-04 VITALS — RESPIRATION RATE: 20 BRPM | BODY MASS INDEX: 29.44 KG/M2 | WEIGHT: 160 LBS | HEIGHT: 62 IN

## 2025-08-04 DIAGNOSIS — N89.8 VAGINAL DISCHARGE: Primary | ICD-10-CM

## 2025-08-04 DIAGNOSIS — N39.41 URGE URINARY INCONTINENCE: ICD-10-CM

## 2025-08-04 DIAGNOSIS — N95.2 POSTMENOPAUSAL ATROPHIC VAGINITIS: ICD-10-CM

## 2025-08-04 DIAGNOSIS — R35.1 NOCTURIA: ICD-10-CM

## 2025-08-04 PROCEDURE — 81514 NFCT DS BV&VAGINITIS DNA ALG: CPT | Performed by: PHYSICIAN ASSISTANT

## 2025-08-04 PROCEDURE — 99212 OFFICE O/P EST SF 10 MIN: CPT

## 2025-08-04 RX ORDER — VIBEGRON 75 MG/1
1 TABLET, FILM COATED ORAL DAILY
Qty: 30 TABLET | Refills: 11 | Status: SHIPPED | OUTPATIENT
Start: 2025-08-04

## 2025-08-04 RX ORDER — ESTRADIOL 0.1 MG/G
CREAM VAGINAL
Qty: 42.5 G | Refills: 3 | Status: SHIPPED | OUTPATIENT
Start: 2025-08-04

## 2025-08-05 ENCOUNTER — CARDPULM VISIT (OUTPATIENT)
Dept: CARDIAC REHAB | Facility: HOSPITAL | Age: 80
End: 2025-08-05
Attending: INTERNAL MEDICINE

## 2025-08-05 ENCOUNTER — RESULTS FOLLOW-UP (OUTPATIENT)
Dept: UROLOGY | Facility: HOSPITAL | Age: 80
End: 2025-08-05

## 2025-08-05 LAB
BV BACTERIA DNA VAG QL NAA+PROBE: NEGATIVE
C GLABRATA DNA VAG QL NAA+PROBE: NEGATIVE
C KRUSEI DNA VAG QL NAA+PROBE: NEGATIVE
CANDIDA DNA VAG QL NAA+PROBE: NEGATIVE
T VAGINALIS DNA VAG QL NAA+PROBE: NEGATIVE

## 2025-08-05 PROCEDURE — 94625 PHY/QHP OP PULM RHB W/O MNTR: CPT

## 2025-08-07 ENCOUNTER — APPOINTMENT (OUTPATIENT)
Dept: CARDIAC REHAB | Facility: HOSPITAL | Age: 80
End: 2025-08-07
Attending: INTERNAL MEDICINE

## 2025-08-16 ENCOUNTER — HOSPITAL ENCOUNTER (OUTPATIENT)
Dept: CT IMAGING | Facility: HOSPITAL | Age: 80
Discharge: HOME | End: 2025-08-16
Attending: INTERNAL MEDICINE

## 2025-08-16 DIAGNOSIS — R09.02 HYPOXIA: ICD-10-CM

## 2025-08-16 DIAGNOSIS — R06.02 SOB (SHORTNESS OF BREATH): ICD-10-CM

## 2025-08-16 LAB
CREAT BLD-MCNC: 0.9 MG/DL (ref 0.55–1.02)
EGFRCR SERPLBLD CKD-EPI 2021: 65 ML/MIN/1.73M2 (ref 60–?)

## 2025-08-16 PROCEDURE — 82565 ASSAY OF CREATININE: CPT

## 2025-08-16 PROCEDURE — 71260 CT THORAX DX C+: CPT | Performed by: INTERNAL MEDICINE

## 2025-08-19 ENCOUNTER — APPOINTMENT (OUTPATIENT)
Dept: CARDIAC REHAB | Facility: HOSPITAL | Age: 80
End: 2025-08-19
Attending: INTERNAL MEDICINE

## 2025-08-21 ENCOUNTER — LAB ENCOUNTER (OUTPATIENT)
Dept: LAB | Facility: HOSPITAL | Age: 80
End: 2025-08-21
Attending: INTERNAL MEDICINE

## 2025-08-21 ENCOUNTER — APPOINTMENT (OUTPATIENT)
Dept: CARDIAC REHAB | Facility: HOSPITAL | Age: 80
End: 2025-08-21
Attending: INTERNAL MEDICINE

## 2025-08-21 DIAGNOSIS — N18.31 CHRONIC KIDNEY DISEASE (CKD) STAGE G3A/A1, MODERATELY DECREASED GLOMERULAR FILTRATION RATE (GFR) BETWEEN 45-59 ML/MIN/1.73 SQUARE METER AND ALBUMINURIA CREATININE RATIO LESS THAN 30 MG/G (HCC): Primary | ICD-10-CM

## 2025-08-21 LAB
CREAT BLD-MCNC: 0.77 MG/DL (ref 0.55–1.02)
EGFRCR SERPLBLD CKD-EPI 2021: 79 ML/MIN/1.73M2 (ref 60–?)

## 2025-08-21 PROCEDURE — 36415 COLL VENOUS BLD VENIPUNCTURE: CPT

## 2025-08-21 PROCEDURE — 82565 ASSAY OF CREATININE: CPT

## 2025-08-22 ENCOUNTER — OFFICE VISIT (OUTPATIENT)
Facility: LOCATION | Age: 80
End: 2025-08-22
Attending: INTERNAL MEDICINE

## 2025-08-22 VITALS
RESPIRATION RATE: 18 BRPM | DIASTOLIC BLOOD PRESSURE: 68 MMHG | HEART RATE: 90 BPM | OXYGEN SATURATION: 98 % | WEIGHT: 160.5 LBS | BODY MASS INDEX: 29 KG/M2 | TEMPERATURE: 99 F | SYSTOLIC BLOOD PRESSURE: 121 MMHG

## 2025-08-22 DIAGNOSIS — M81.0 SENILE OSTEOPOROSIS: Primary | ICD-10-CM

## 2025-08-22 RX ORDER — ZOLEDRONIC ACID 0.05 MG/ML
5 INJECTION, SOLUTION INTRAVENOUS ONCE
Status: CANCELLED | OUTPATIENT
Start: 2025-08-22

## 2025-08-22 RX ORDER — ZOLEDRONIC ACID 0.05 MG/ML
5 INJECTION, SOLUTION INTRAVENOUS ONCE
Status: COMPLETED | OUTPATIENT
Start: 2025-08-22 | End: 2025-08-22

## 2025-08-22 RX ORDER — ZOLEDRONIC ACID 0.05 MG/ML
INJECTION, SOLUTION INTRAVENOUS
Status: COMPLETED
Start: 2025-08-22 | End: 2025-08-22

## 2025-08-22 RX ADMIN — ZOLEDRONIC ACID 5 MG: 0.05 INJECTION, SOLUTION INTRAVENOUS at 13:14:00

## 2025-08-26 ENCOUNTER — CARDPULM VISIT (OUTPATIENT)
Dept: CARDIAC REHAB | Facility: HOSPITAL | Age: 80
End: 2025-08-26
Attending: INTERNAL MEDICINE

## 2025-08-26 PROCEDURE — 94625 PHY/QHP OP PULM RHB W/O MNTR: CPT

## 2025-08-28 ENCOUNTER — CARDPULM VISIT (OUTPATIENT)
Dept: CARDIAC REHAB | Facility: HOSPITAL | Age: 80
End: 2025-08-28
Attending: INTERNAL MEDICINE

## 2025-08-28 PROCEDURE — 94625 PHY/QHP OP PULM RHB W/O MNTR: CPT

## (undated) DEVICE — CAST TAPE SYNTH 2

## (undated) DEVICE — CATHETER THOR 24FR L22IN BLU RADPQ STRP THRM

## (undated) DEVICE — THORACIC: Brand: MEDLINE INDUSTRIES, INC.

## (undated) DEVICE — SUT COAT VCRL 2-0 27IN ABSRB VLT 36MM CT-1

## (undated) DEVICE — SUCTION CANISTER, 3000CC,SAFELINER: Brand: DEROYAL

## (undated) DEVICE — Device

## (undated) DEVICE — COVER SGL STRL LGHT HNDL BLU

## (undated) DEVICE — SOLUTION IRRIG 1000ML 0.9% NACL USP BTL

## (undated) DEVICE — REM POLYHESIVE ADULT PATIENT RETURN ELECTRODE: Brand: VALLEYLAB

## (undated) DEVICE — WEBRIL COTTON UNDERCAST PADDING: Brand: WEBRIL

## (undated) DEVICE — TIES TBNG 0.25IN BLK POLYPR SLF LOK STRP

## (undated) DEVICE — COTTON UNDERCAST PADDING,REGULAR FINISH: Brand: WEBRIL

## (undated) DEVICE — SUTURE ETHILON 4-0 662G

## (undated) DEVICE — STERILE TETRA-FLEX CF, ELASTIC BANDAGE, 2" X 5.5YD: Brand: TETRA-FLEX™CF

## (undated) DEVICE — ADHESIVE SKIN TOP FOR WND CLSR DERMBND ADV

## (undated) DEVICE — PETROLATUM GAUZE CISION DRESSING: Brand: VASELINE

## (undated) DEVICE — ABSORBABLE HEMOSTAT (OXIDIZED REGENERATED CELLULOSE): Brand: SURGICEL

## (undated) DEVICE — UPPER EXTREMITY: Brand: MEDLINE INDUSTRIES, INC.

## (undated) DEVICE — SUTURE ETHILON 3-0 PS-2

## (undated) DEVICE — WRIST SPLINT: Brand: DEROYAL

## (undated) DEVICE — SOL  .9 1000ML BTL

## (undated) DEVICE — CUFF TRNQT CYL 24X4IN 2 PORT 2

## (undated) DEVICE — SUTURE ETHILON 3-0 669H

## (undated) DEVICE — TRAY SRGPRP PVP IOD WT SCRB SM

## (undated) DEVICE — ENCORE® LATEX MICRO SIZE 6, STERILE LATEX POWDER-FREE SURGICAL GLOVE: Brand: ENCORE

## (undated) DEVICE — DRAIN CHST SGL COLL 1 PT TB FOR ATS BG CMPTBL

## (undated) DEVICE — CONTAINER,SPECIMEN,OR STERILE,4OZ: Brand: MEDLINE

## (undated) DEVICE — TRAY CATH 16FR F INCL BARDX IC COMPLT CARE

## (undated) DEVICE — SUT MCRYL 4-0 27IN ABSRB UD L24MM PS-1

## (undated) DEVICE — STRETCH BANDAGE: Brand: CURITY

## (undated) DEVICE — SOLUTION IRRIG 1000ML ST H2O AQUALITE PLAS

## (undated) DEVICE — DRAPE SHEET LG

## (undated) DEVICE — THE ECHELON, ECHELON ENDOPATH™ AND ECHELON FLEX™ FAMILIES OF ENDOSCOPIC LINEAR CUTTERS AND RELOADS ARE STERILE, SINGLE PATIENT USE INSTRUMENTS THAT SIMULTANEOUSLY CUT AND STAPLE TISSUE. THERE ARE SIX STAGGERED ROWS OF STAPLES, THREE ON EITHER SIDE OF THE CUT LINE. THE 45 MM INSTRUMENTS HAVE A STAPLE LINE THATIS APPROXIMATELY 45 MM LONG AND A CUT LINE THAT IS APPROXIMATELY 42 MM LONG. THE SHAFT CAN ROTATE FREELY IN BOTH DIRECTIONS AND AN ARTICULATION MECHANISM ON ARTICULATING INSTRUMENTS ENABLES BENDING THE DISTAL PORTIONOF THE SHAFT TO FACILITATE LATERAL ACCESS OF THE OPERATIVE SITE.THE INSTRUMENTS ARE SHIPPED WITHOUT A RELOAD AND MUST BE LOADED PRIOR TO USE. A STAPLE RETAINING CAP ON THE RELOAD PROTECTS THE STAPLE LEG POINTS DURING SHIPPING AND TRANSPORTATION. THE INSTRUMENTS’ LOCK-OUT FEATURE IS DESIGNED TO PREVENT A USED RELOAD FROM BEING REFIRED.: Brand: ECHELON ENDOPATH

## (undated) DEVICE — BRONCHOSCOPE VIDEO OD3.8MM

## (undated) DEVICE — WOUND RETRACTOR AND PROTECTOR: Brand: ALEXIS WOUND PROTECTOR-RETRACTOR

## (undated) DEVICE — ECHELON 3000 45 STANDARD: Brand: ECHELON

## (undated) DEVICE — STERILE LATEX POWDER-FREE SURGICAL GLOVESWITH NITRILE COATING: Brand: PROTEXIS

## (undated) DEVICE — DRESSING PETRO 18X3IN ABS NADH

## (undated) DEVICE — ZIMMER® STERILE DISPOSABLE TOURNIQUET CUFF WITH PLC, DUAL PORT, DUAL BLADDER, 18 IN. (46 CM)

## (undated) DEVICE — SUT PERMA- 0 30IN FSL NABSRB BLK 30MM 3/8

## (undated) DEVICE — ELECTRODE LAP L33CM DIA5MM PTFE L WRE JAW

## (undated) DEVICE — CAST PADDING SYNTH 3

## (undated) DEVICE — ENCORE® LATEX MICRO SIZE 7.5, STERILE LATEX POWDER-FREE SURGICAL GLOVE: Brand: ENCORE

## (undated) NOTE — LETTER
[place] [address]  Authorization for Invasive Procedures  Date: 08/11/2024           Time: 1411    I hereby authorize Brian Orona M.D., my physician and his/her assistants (if applicable), which may include medical students, residents, and/or fellows, to perform the following surgical operation/ procedure and administer such anesthesia as may be determined necessary by my physician: Flexible bronchoscopy, Right Video Assisted Thoracoscopic Bleb resection and pleurodesis  on Annmarie Levin  2.   I recognize that during the surgical operation/procedure, unforeseen conditions may necessitate additional or different procedures than those listed above.  I, therefore, further authorize and request that the above-named surgeon, assistants, or designees perform such procedures as are, in their judgment, necessary and desirable.    3.   My surgeon/physician has discussed prior to my surgery the potential benefits, risks and side effects of this procedure; the likelihood of achieving goals; and potential problems that might occur during recuperation.  They also discussed reasonable alternatives to the procedure, including risks, benefits, and side effects related to the alternatives and risks related to not receiving this procedure.  I have had all my questions answered and I acknowledge that no guarantee has been made as to the result that may be obtained.    4.   Should the need arise during my operation/procedure, which includes change of level of care prior to discharge, I also consent to the administration of blood and/or blood products.  Further, I understand that despite careful testing and screening of blood or blood products by collecting agencies, I may still be subject to ill effects as a result of receiving a blood transfusion and/or blood products.  The following are some, but not all, of the potential risks that can occur: fever and allergic reactions, hemolytic reactions,  transmission of diseases such as Hepatitis, AIDS and Cytomegalovirus (CMV) and fluid overload.  In the event that I wish to have an autologous transfusion of my own blood, or a directed donor transfusion, I will discuss this with my physician.   Check only if Refusing Blood or Blood Products  I understand refusal of blood or blood products as deemed necessary by my physician may have serious consequences to my condition to include possible death. I hereby assume responsibility for my refusal and release the hospital, its personnel, and my physicians from any responsibility for the consequences of my refusal.         o  Refuse         5.   I authorize the use of any specimen, organs, tissues, body parts or foreign objects that may be removed from my body during the operation/procedure for diagnosis, research or teaching purposes and their subsequent disposal by hospital authorities.  I also authorize the release of specimen test results and/or written reports to my treating physician on the hospital medical staff or other referring or consulting physicians involved in my care, at the discretion of the Pathologist or my treating physician.    6.   I consent to the photographing or videotaping of the operations or procedures to be performed, including appropriate portions of my body for medical, scientific, or educational purposes, provided my identity is not revealed by the pictures or by descriptive texts accompanying them.  If the procedure has been photographed/videotaped, the surgeon will obtain the original picture, image, videotape or CD.  The hospital will not be responsible for storage, release or maintenance of the picture, image, tape or CD.    7.   I consent to the presence of a  or observers in the operating room as deemed necessary by my physician or their designees.    8.   I recognize that in the event my procedure results in extended X-Ray/fluoroscopy time, I may develop a skin  reaction.    9. If I have a Do Not Attempt Resuscitation (DNAR) order in place, that status will be suspended while in the operating room, procedural suite, and during the recovery period unless otherwise explicitly stated by me (or a person authorized to consent on my behalf). The surgeon or my attending physician will determine when the applicable recovery period ends for purposes of reinstating the DNAR order.  10. Patients having a sterilization procedure: I understand that if the procedure is successful the results will be permanent and it will therefore be impossible for me to inseminate, conceive, or bear children.  I also understand that the procedure is intended to result in sterility, although the result has not been guaranteed.   11. I acknowledge that my physician has explained sedation/analgesia administration to me including the risk and benefits I consent to the administration of sedation/analgesia as may be necessary or desirable in the judgment of my physician.    I CERTIFY THAT I HAVE READ AND FULLY UNDERSTAND THE ABOVE CONSENT TO OPERATION and/or OTHER PROCEDURE.        ____________________________________       _________________________________      ______________________________  Signature of Patient         Signature of Responsible Person        Printed Name of Responsible Person        ____________________________________      _________________________________      ______________________________       Signature of Witness          Relationship to Patient                       Date                                       Time  Patient Name: Annmarie Levin  : 1945    Reviewed: 2024   Printed: 2024  Medical Record #: [id number] Page 1 of 2             STATEMENT OF PHYSICIAN My signature below affirms that prior to the time of the procedure; I have explained to the patient and/or his/her legal representative, the risks and benefits involved in the proposed treatment and  any reasonable alternative to the proposed treatment. I have also explained the risks and benefits involved in refusal of the proposed treatment and alternatives to the proposed treatment and have answered the patient's questions. If I have a significant financial interest in a co-management agreement or a significant financial interest in any product or implant, or other significant relationship used in this procedure/surgery, I have disclosed this and had a discussion with my patient.     _______________________________________________________________ _____________________________  (Signature of Physician)                                                                                         (Date)                                   (Time)  Patient Name: Annmarie Levin  : 1945    Reviewed: 2024   Printed: 2024  Medical Record #: [id number] Page 2 of 2

## (undated) NOTE — LETTER
[address] [facility]  [address], [address]  [address] [place] [address] [phone]        Dear Elvis Nettles,      I had the pleasure of seeing your patient, Laci Yoder on 9/8/2017.      Below please find a summary of our vis

## (undated) NOTE — LETTER
[facility]  [address], [place], [place]    Authorization for Surgical Operation and Procedure                               I hereby authorize Dr Montoya/Dr Hernandez , my physician and his/her assistants (if applicable), which may include medical students, residents, and/or fellows, to perform the following surgical operation/ procedure and administer such anesthesia as may be determined necessary by my physician: Chest tube insertion on Annmarie Levin   2.   I recognize that during the surgical operation/procedure, unforeseen conditions may necessitate additional or different procedures than those listed above.  I, therefore, further authorize and request that the above-named surgeon, assistants, or designees perform such procedures as are, in their judgment, necessary and desirable.    3.   My surgeon/physician has discussed prior to my surgery the potential benefits, risks and side effects of this procedure; the likelihood of achieving goals; and potential problems that might occur during recuperation.  They also discussed reasonable alternatives to the procedure, including risks, benefits, and side effects related to the alternatives and risks related to not receiving this procedure.  I have had all my questions answered and I acknowledge that no guarantee has been made as to the result that may be obtained.    4.   Should the need arise during my operation/procedure, which includes change of level of care prior to discharge, I also consent to the administration of blood and/or blood products.  Further, I understand that despite careful testing and screening of blood or blood products by collecting agencies, I may still be subject to ill effects as a result of receiving a blood transfusion and/or blood products.  The following are some, but not all, of the potential risks that can occur: fever and allergic reactions, hemolytic reactions, transmission of diseases such as Hepatitis, AIDS and  Cytomegalovirus (CMV) and fluid overload.  In the event that I wish to have an autologous transfusion of my own blood, or a directed donor transfusion, I will discuss this with my physician.  Check only if Refusing Blood or Blood Products  I understand refusal of blood or blood products as deemed necessary by my physician may have serious consequences to my condition to include possible death. I hereby assume responsibility for my refusal and release the hospital, its personnel, and my physicians from any responsibility for the consequences of my refusal.    o  Refuse   5.   I authorize the use of any specimen, organs, tissues, body parts or foreign objects that may be removed from my body during the operation/procedure for diagnosis, research or teaching purposes and their subsequent disposal by hospital authorities.  I also authorize the release of specimen test results and/or written reports to my treating physician on the hospital medical staff or other referring or consulting physicians involved in my care, at the discretion of the Pathologist or my treating physician.    6.   I consent to the photographing or videotaping of the operations or procedures to be performed, including appropriate portions of my body for medical, scientific, or educational purposes, provided my identity is not revealed by the pictures or by descriptive texts accompanying them.  If the procedure has been photographed/videotaped, the surgeon will obtain the original picture, image, videotape or CD.  The hospital will not be responsible for storage, release or maintenance of the picture, image, tape or CD.    7.   I consent to the presence of a  or observers in the operating room as deemed necessary by my physician or their designees.    8.   I recognize that in the event my procedure results in extended X-Ray/fluoroscopy time, I may develop a skin reaction.    9. If I have a Do Not Attempt Resuscitation (DNAR) order  in place, that status will be suspended while in the operating room, procedural suite, and during the recovery period unless otherwise explicitly stated by me (or a person authorized to consent on my behalf). The surgeon or my attending physician will determine when the applicable recovery period ends for purposes of reinstating the DNAR order.  10. Patients having a sterilization procedure: I understand that if the procedure is successful the results will be permanent and it will therefore be impossible for me to inseminate, conceive, or bear children.  I also understand that the procedure is intended to result in sterility, although the result has not been guaranteed.   11. I acknowledge that my physician has explained sedation/analgesia administration to me including the risk and benefits I consent to the administration of sedation/analgesia as may be necessary or desirable in the judgment of my physician.    I CERTIFY THAT I HAVE READ AND FULLY UNDERSTAND THE ABOVE CONSENT TO OPERATION and/or OTHER PROCEDURE.     ____________________________________  _________________________________        ______________________________  Signature of Patient    Signature of Responsible Person                Printed Name of Responsible Person                                      ____________________________________  _____________________________                ________________________________  Signature of Witness        Date  Time         Relationship to Patient    STATEMENT OF PHYSICIAN My signature below affirms that prior to the time of the procedure; I have explained to the patient and/or his/her legal representative, the risks and benefits involved in the proposed treatment and any reasonable alternative to the proposed treatment. I have also explained the risks and benefits involved in refusal of the proposed treatment and alternatives to the proposed treatment and have answered the patient's questions. If I have a  significant financial interest in a co-management agreement or a significant financial interest in any product or implant, or other significant relationship used in this procedure/surgery, I have disclosed this and had a discussion with my patient.     _____________________________________________________              _____________________________  (Signature of Physician)                                                                                         (Date)                                   (Time)  Patient Name: Annmarie Levin      : 1945      Printed: 2024     Medical Record #: [id number]                                      Page 1 of 1

## (undated) NOTE — LETTER
[place] [address]  Authorization for Invasive Procedures  Date: 10/8/2024           Time: 0800    I hereby authorize Dr. Marino Garcia, my physician and his/her assistants (if applicable), which may include medical students, residents, and/or fellows, to perform the following surgical operation/ procedure and administer such anesthesia as may be determined necessary by my physician: Cardioversion with Transesophageal Echocardiogram on Annmarie Levin  2.   I recognize that during the surgical operation/procedure, unforeseen conditions may necessitate additional or different procedures than those listed above.  I, therefore, further authorize and request that the above-named surgeon, assistants, or designees perform such procedures as are, in their judgment, necessary and desirable.    3.   My surgeon/physician has discussed prior to my surgery the potential benefits, risks and side effects of this procedure; the likelihood of achieving goals; and potential problems that might occur during recuperation.  They also discussed reasonable alternatives to the procedure, including risks, benefits, and side effects related to the alternatives and risks related to not receiving this procedure.  I have had all my questions answered and I acknowledge that no guarantee has been made as to the result that may be obtained.    4.   Should the need arise during my operation/procedure, which includes change of level of care prior to discharge, I also consent to the administration of blood and/or blood products.  Further, I understand that despite careful testing and screening of blood or blood products by collecting agencies, I may still be subject to ill effects as a result of receiving a blood transfusion and/or blood products.  The following are some, but not all, of the potential risks that can occur: fever and allergic reactions, hemolytic reactions, transmission of diseases such as Hepatitis, AIDS  and Cytomegalovirus (CMV) and fluid overload.  In the event that I wish to have an autologous transfusion of my own blood, or a directed donor transfusion, I will discuss this with my physician.   Check only if Refusing Blood or Blood Products  I understand refusal of blood or blood products as deemed necessary by my physician may have serious consequences to my condition to include possible death. I hereby assume responsibility for my refusal and release the hospital, its personnel, and my physicians from any responsibility for the consequences of my refusal.         o  Refuse         5.   I authorize the use of any specimen, organs, tissues, body parts or foreign objects that may be removed from my body during the operation/procedure for diagnosis, research or teaching purposes and their subsequent disposal by hospital authorities.  I also authorize the release of specimen test results and/or written reports to my treating physician on the hospital medical staff or other referring or consulting physicians involved in my care, at the discretion of the Pathologist or my treating physician.    6.   I consent to the photographing or videotaping of the operations or procedures to be performed, including appropriate portions of my body for medical, scientific, or educational purposes, provided my identity is not revealed by the pictures or by descriptive texts accompanying them.  If the procedure has been photographed/videotaped, the surgeon will obtain the original picture, image, videotape or CD.  The hospital will not be responsible for storage, release or maintenance of the picture, image, tape or CD.    7.   I consent to the presence of a  or observers in the operating room as deemed necessary by my physician or their designees.    8.   I recognize that in the event my procedure results in extended X-Ray/fluoroscopy time, I may develop a skin reaction.    9. If I have a Do Not Attempt  Resuscitation (DNAR) order in place, that status will be suspended while in the operating room, procedural suite, and during the recovery period unless otherwise explicitly stated by me (or a person authorized to consent on my behalf). The surgeon or my attending physician will determine when the applicable recovery period ends for purposes of reinstating the DNAR order.  10. Patients having a sterilization procedure: I understand that if the procedure is successful the results will be permanent and it will therefore be impossible for me to inseminate, conceive, or bear children.  I also understand that the procedure is intended to result in sterility, although the result has not been guaranteed.   11. I acknowledge that my physician has explained sedation/analgesia administration to me including the risk and benefits I consent to the administration of sedation/analgesia as may be necessary or desirable in the judgment of my physician.    I CERTIFY THAT I HAVE READ AND FULLY UNDERSTAND THE ABOVE CONSENT TO OPERATION and/or OTHER PROCEDURE.        ____________________________________       _________________________________      ______________________________  Signature of Patient         Signature of Responsible Person        Printed Name of Responsible Person        ____________________________________      _________________________________      ______________________________       Signature of Witness          Relationship to Patient                       Date                                       Time  Patient Name: Annmarie Levin  : 1945    Reviewed: 2024   Printed: 2024  Medical Record #: [id number] Page 1 of 2             STATEMENT OF PHYSICIAN My signature below affirms that prior to the time of the procedure; I have explained to the patient and/or his/her legal representative, the risks and benefits involved in the proposed treatment and any reasonable alternative to the proposed  treatment. I have also explained the risks and benefits involved in refusal of the proposed treatment and alternatives to the proposed treatment and have answered the patient's questions. If I have a significant financial interest in a co-management agreement or a significant financial interest in any product or implant, or other significant relationship used in this procedure/surgery, I have disclosed this and had a discussion with my patient.     _______________________________________________________________ _____________________________  (Signature of Physician)                                                                                         (Date)                                   (Time)  Patient Name: Annmarie Levin  : 1945    Reviewed: 2024   Printed: 2024  Medical Record #: [id number] Page 2 of 2

## (undated) NOTE — Clinical Note
Ms. Levin is doing well.  Please see attached note for an update on her operation and pathology. Please feel free to call me with any questions at [phone].  Thank you,  [facility]

## (undated) NOTE — Clinical Note
I saw your patient, Ms. Lilliana Rutledge. Please see attached note for my assessment and plans moving forward. Thank you for involving her care.   Please feel free to call me with any questions at [place] [address] [facility]

## (undated) NOTE — LETTER
Hospital Discharge Documentation  Please phone to schedule a hospital follow up appointment.    From: [facility] Hospitalist's Office  Phone: [phone]    Patient discharged time/date: 10/9/2024  3:51 PM  Patient discharge disposition:  Home or Self Care       Discharge Summary - D/C Summary        Discharge Summary signed by Seema Estrada MD at 10/9/2024  2:25 PM  Version 1 of 1      Author: Seema Estrada MD Service: Hospitalist Author Type: Physician    Filed: 10/9/2024  2:25 PM Date of Service: 10/9/2024  2:20 PM Status: Signed    : Seema Estrada MD (Physician)           [facility]  part of [facility]    Discharge Summary    Annmarie Levin Patient Status:  Inpatient    1945 MRN [id number]   Location [facility] 3W/SW Attending Seema Estrada MD   Hosp Day # 4 PCP LLOYD ANDREWS MD     Date of Admission: 10/5/2024      Date of Discharge: 10/09/24      Admitting Diagnosis: Atrial fibrillation with RVR ([facility]) [I48.91]    Hospital Discharge Diagnoses:  Afib rvr    Lace+ Score: 77  59-90 High Risk  29-58 Medium Risk  0-28   Low Risk.    TCM Follow-Up Recommendation:  LACE > 58: High Risk of readmission after discharge from the hospital.          Problem List:   Patient Active Problem List   Diagnosis    Malignant neoplasm of upper-outer quadrant of right breast in female, estrogen receptor negative (HCC)    Osteopenia of multiple sites    DJD (degenerative joint disease)    GERD (gastroesophageal reflux disease)    Right carpal tunnel syndrome    Chronic right shoulder pain    Impingement syndrome of right shoulder    Arthritis of shoulder region    Adult BMI 45.0-49.9 kg/sq m (HCC)    Sleep apnea    Soft tissue mass    Status post mastectomy, right    Nontraumatic tear of right rotator cuff    Mixed hyperlipidemia    Fatigue    Acute pain of right knee    Mediastinal mass    Pneumothorax, unspecified type    Atrial fibrillation with  RVR ([facility])         Physical Exam:     Gen: No acute distress  Pulm: Lungs clear, normal respiratory effort  CV: Heart with regular rate and rhythm  Abd: Abdomen soft, nontender, nondistended, bowel sounds present  Neuro: No acute focal deficits  MSK: moves extremities  Skin: Warm and dry  Psych: Normal affect  Ext: no c/c/e      History of Present Illness: Per Dr Cain    Annmarie Levin is a 78 year old female with a past medical history of COPD, breast cancer s/p mastectomy/radiation and recent admission for COPD exacerbation and pneumothorax presented to the ER with complaints of SOB and palpitations that began earlier this afternoon. The patient stated she was previously diagnosed with afib and stated she felt the same way at that time.   Of note, the patient was recently diagnosed with possible PNA and was started on a zpak, the course of which she completed yesterday.   She denied any other complaints at the time of interview.     Hospital Course:     Paroxysmal Afib with RVR  -[id number] 3  -Cardiology on consult  -Started on Cardizem drip - now stopped  -Heparin drip for AC--> transition to doac per cardiology - home on eliquis  -TSH 1.245 on 9/26/24  -post op a. Fib noted in August  -would be interested in watchman device  -was planning ELENO with cardioversion, but had desaturation on 10/7  -now s/p successful ELENO 10/8       SOB likely 2/2 worsening pulmonary edema  Persistent R pleural effusion  -CXR reviewed  -CT chest reviewed - fu as outpt  -pBNP elevated- 1,296  -Recently completed course of Z-percy  -No wheezing on exam, hold off on steroids at this time  -monitor pulse ox  -hx. Of recent pneumothorax and bleb resection  -s/p vats on 8/12  -home on oral lasix per cards     Acute hypoxemic respiratory failure on Chronic respiratory failure  Hx of COPD  -Continue home inhalers  -Saturating well on 3-4L NC, baseline 2L  -Monitor pulse ox, supplement as indicated  -Hold off on steroids at this time      Obesity with NADJA  -BMI 33  -Counseled on making healthy lifestyle and dietary changes     Ch. Anemia  -fu as outpt     Hx. Breast Cancer  Hx. Of Mediastinal mass   -fu as outpt    Discharge Condition: Stable    Discharge Medications:      Discharge Medications        START taking these medications        Instructions Prescription details   apixaban 5 MG Tabs  Commonly known as: Eliquis      Take 1 tablet (5 mg total) by mouth 2 (two) times daily.   Quantity: 60 tablet  Refills: 1     furosemide 20 MG Tabs  Commonly known as: Lasix      Take 1 tablet (20 mg total) by mouth daily.   Quantity: 5 tablet  Refills: 0            CHANGE how you take these medications        Instructions Prescription details   Gemtesa 75 MG Tabs  Generic drug: Vibegron  What changed: Another medication with the same name was removed. Continue taking this medication, and follow the directions you see here.      Take 1 tablet by mouth daily.   Refills: 0     multivitamin Tabs  What changed: Another medication with the same name was removed. Continue taking this medication, and follow the directions you see here.      Take 1 tablet by mouth daily with breakfast.   Refills: 0            CONTINUE taking these medications        Instructions Prescription details   albuterol 108 (90 Base) MCG/ACT Aers  Commonly known as: Ventolin HFA      Inhale 2 puffs into the lungs every 4 (four) hours as needed.   Refills: 0     Calcium Carbonate-Vitamin D 600-200 MG-UNIT Caps      Take  by mouth.   Refills: 0     cyanocobalamin 500 MCG Tabs  Commonly known as: Vitamin B12      Take 1 tablet (500 mcg total) by mouth daily.   Refills: 0     estradiol 0.1 MG/GM Crea  Commonly known as: Estrace      Apply 1/2 gram vaginally 2 times per week. Use at bedtime.   Quantity: 42.5 g  Refills: 3     fluticasone propionate 50 MCG/ACT Susp  Commonly known as: Flonase      2 sprays by Nasal route daily.   Refills: 0     LUVENA PREBIOTIC LUBRICANT VA      Place  vaginally.  Indications: One every four days, per pt's medication list   Refills: 0     metoprolol succinate ER 50 MG Tb24  Commonly known as: Toprol XL      Take 1 tablet (50 mg total) by mouth Daily Beta Blocker.   Quantity: 30 tablet  Refills: 1     montelukast 10 MG Tabs  Commonly known as: Singulair      Take by mouth.   Refills: 0     Omeprazole 40 MG Cpdr      Take 1 capsule (40 mg total) by mouth daily.   Refills: 0     PATIENT SUPPLIED MEDICATION      2L of O2 as needed   Refills: 0     Symbicort 160-4.5 MCG/ACT Aero  Generic drug: Budesonide-Formoterol Fumarate      Inhale 2 puffs into the lungs 2 (two) times daily.   Refills: 0     tiotropium 18 MCG Caps  Commonly known as: Spiriva Handihaler      Inhale into the lungs daily.   Refills: 0     Vitamin D 50 MCG (2000 UT) Caps      Take 1 capsule (2,000 Units total) by mouth daily.   Refills: 0            STOP taking these medications      aspirin 325 MG Tabs                  Where to Get Your Medications        These medications were sent to [facility]/pharmacy #[id number] - [address] AT [facility] from [facility], [phone], [phone]  [address] [place] [address]      Phone: [phone]   apixaban 5 MG Tabs  furosemide 20 MG Tabs             Seema Estrada MD  10/9/2024  2:20 PM    Greater than 30 minutes spent on preparation and coordination of this discharge    Electronically signed by Seema Estrada MD on 10/9/2024  2:25 PM

## (undated) NOTE — LETTER
[facility]  Administration of Anesthesia  IAnnmarie agree to be cared for by a physician anesthesiologist alone and/or with a nurse anesthetist, who is specially trained to monitor me and give me medicine to put me to sleep or keep me comfortable during my procedure    I understand that my anesthesiologist and/or anesthetist is not an employee or agent of [facility] or [employer]. He or she works for [employer]    As the patient asking for anesthesia services, I agree to:  Allow the anesthesiologist (anesthesia doctor) to give me medicine and do additional procedures as necessary. Some examples are: Starting or using an “IV” to give me medicine, fluids or blood during my procedure, and having a breathing tube placed to help me breathe when I’m asleep (intubation). In the event that my heart stops working properly, I understand that my anesthesiologist will make every effort to sustain my life, unless otherwise directed by [facility] Do Not Resuscitate documents.  Tell my anesthesia doctor before my procedure:  If I am pregnant.  The last time that I ate or drank.  iii. All of the medicines I take (including prescriptions, herbal supplements, and pills I can buy without a prescription (including street drugs/illegal medications). Failure to inform my anesthesiologist about these medicines may increase my risk of anesthetic complications.  iv.If I am allergic to anything or have had a reaction to anesthesia before.  I understand how the anesthesia medicine will help me (benefits).  I understand that with any type of anesthesia medicine there are risks:  The most common risks are: nausea, vomiting, sore throat, muscle soreness, damage to my eyes, mouth, or teeth (from breathing tube placement).  Rare risks include: remembering what happened during my procedure, allergic reactions to medications, injury to my airway, heart, lungs, vision, nerves, or  muscles and in extremely rare instances death.  My doctor has explained to me other choices available to me for my care (alternatives).  Pregnant Patients (“epidural”):  I understand that the risks of having an epidural (medicine given into my back to help control pain during labor), include itching, low blood pressure, difficulty urinating, headache or slowing of the baby’s heart. Very rare risks include infection, bleeding, seizure, irregular heart rhythms and nerve injury.  Regional Anesthesia (“spinal”, “epidural”, & “nerve blocks”):  I understand that rare but potential complications include headache, bleeding, infection, seizure, irregular heart rhythms, and nerve injury.    _____________________________________________________________________________  Patient (or Representative) Signature/Relationship to Patient  Date   Time    _____________________________________________________________________________   Name (if used)    Language/Organization   Time    _____________________________________________________________________________  Nurse Anesthetist Signature     Date   Time  _____________________________________________________________________________  Anesthesiologist Signature     Date   Time  I have discussed the procedure and information above with the patient (or patient’s representative) and answered their questions. The patient or their representative has agreed to have anesthesia services.    _____________________________________________________________________________  Witness        Date   Time  I have verified that the signature is that of the patient or patient’s representative, and that it was signed before the procedure  Patient Name: Annmarie Levin     : 1945                 Printed: 10/8/2024 at 10:01 AM    Medical Record #: [id number]                                            Page 1 of 1  ----------ANESTHESIA CONSENT----------

## (undated) NOTE — LETTER
[place] [address]  Authorization for Invasive Procedures  Date: 08/09/2024           Time: 1156      I hereby authorize Dr. Montoya, my physician and his/her assistants (if applicable), which may include medical students, residents, and/or fellows, to perform the following surgical operation/ procedure and administer such anesthesia as may be determined necessary by my physician: chest tube insertion on Annmarie Levin  2.   I recognize that during the surgical operation/procedure, unforeseen conditions may necessitate additional or different procedures than those listed above.  I, therefore, further authorize and request that the above-named surgeon, assistants, or designees perform such procedures as are, in their judgment, necessary and desirable.    3.   My surgeon/physician has discussed prior to my surgery the potential benefits, risks and side effects of this procedure; the likelihood of achieving goals; and potential problems that might occur during recuperation.  They also discussed reasonable alternatives to the procedure, including risks, benefits, and side effects related to the alternatives and risks related to not receiving this procedure.  I have had all my questions answered and I acknowledge that no guarantee has been made as to the result that may be obtained.    4.   Should the need arise during my operation/procedure, which includes change of level of care prior to discharge, I also consent to the administration of blood and/or blood products.  Further, I understand that despite careful testing and screening of blood or blood products by collecting agencies, I may still be subject to ill effects as a result of receiving a blood transfusion and/or blood products.  The following are some, but not all, of the potential risks that can occur: fever and allergic reactions, hemolytic reactions, transmission of diseases such as Hepatitis, AIDS and Cytomegalovirus (CMV) and  fluid overload.  In the event that I wish to have an autologous transfusion of my own blood, or a directed donor transfusion, I will discuss this with my physician.   Check only if Refusing Blood or Blood Products  I understand refusal of blood or blood products as deemed necessary by my physician may have serious consequences to my condition to include possible death. I hereby assume responsibility for my refusal and release the hospital, its personnel, and my physicians from any responsibility for the consequences of my refusal.         o  Refuse         5.   I authorize the use of any specimen, organs, tissues, body parts or foreign objects that may be removed from my body during the operation/procedure for diagnosis, research or teaching purposes and their subsequent disposal by hospital authorities.  I also authorize the release of specimen test results and/or written reports to my treating physician on the hospital medical staff or other referring or consulting physicians involved in my care, at the discretion of the Pathologist or my treating physician.    6.   I consent to the photographing or videotaping of the operations or procedures to be performed, including appropriate portions of my body for medical, scientific, or educational purposes, provided my identity is not revealed by the pictures or by descriptive texts accompanying them.  If the procedure has been photographed/videotaped, the surgeon will obtain the original picture, image, videotape or CD.  The hospital will not be responsible for storage, release or maintenance of the picture, image, tape or CD.    7.   I consent to the presence of a  or observers in the operating room as deemed necessary by my physician or their designees.    8.   I recognize that in the event my procedure results in extended X-Ray/fluoroscopy time, I may develop a skin reaction.    9. If I have a Do Not Attempt Resuscitation (DNAR) order in place, that  status will be suspended while in the operating room, procedural suite, and during the recovery period unless otherwise explicitly stated by me (or a person authorized to consent on my behalf). The surgeon or my attending physician will determine when the applicable recovery period ends for purposes of reinstating the DNAR order.  10. Patients having a sterilization procedure: I understand that if the procedure is successful the results will be permanent and it will therefore be impossible for me to inseminate, conceive, or bear children.  I also understand that the procedure is intended to result in sterility, although the result has not been guaranteed.   11. I acknowledge that my physician has explained sedation/analgesia administration to me including the risk and benefits I consent to the administration of sedation/analgesia as may be necessary or desirable in the judgment of my physician.    I CERTIFY THAT I HAVE READ AND FULLY UNDERSTAND THE ABOVE CONSENT TO OPERATION and/or OTHER PROCEDURE.        ____________________________________       _________________________________      ______________________________  Signature of Patient         Signature of Responsible Person        Printed Name of Responsible Person        ____________________________________      _________________________________      ______________________________       Signature of Witness          Relationship to Patient                       Date                                       Time  Patient Name: Annmarie Levin  : 1945    Reviewed: 2024   Printed: 2024  Medical Record #: [id number] Page 1 of 2             STATEMENT OF PHYSICIAN My signature below affirms that prior to the time of the procedure; I have explained to the patient and/or his/her legal representative, the risks and benefits involved in the proposed treatment and any reasonable alternative to the proposed treatment. I have also explained the risks  and benefits involved in refusal of the proposed treatment and alternatives to the proposed treatment and have answered the patient's questions. If I have a significant financial interest in a co-management agreement or a significant financial interest in any product or implant, or other significant relationship used in this procedure/surgery, I have disclosed this and had a discussion with my patient.     _______________________________________________________________ _____________________________  (Signature of Physician)                                                                                         (Date)                                   (Time)  Patient Name: Annmarie Levin  : 1945    Reviewed: 2024   Printed: 2024  Medical Record #: [id number] Page 2 of 2

## (undated) NOTE — LETTER
[place] [address]  Authorization for Invasive Procedures  Date: 10/7/2024           Time: 0800    I hereby authorize Dr. Wayne aMy, my physician and his/her assistants (if applicable), which may include medical students, residents, and/or fellows, to perform the following surgical operation/ procedure and administer such anesthesia as may be determined necessary by my physician: Cardioversion with Transesophageal Echocardiogram  on Annmarie Levin  2.   I recognize that during the surgical operation/procedure, unforeseen conditions may necessitate additional or different procedures than those listed above.  I, therefore, further authorize and request that the above-named surgeon, assistants, or designees perform such procedures as are, in their judgment, necessary and desirable.    3.   My surgeon/physician has discussed prior to my surgery the potential benefits, risks and side effects of this procedure; the likelihood of achieving goals; and potential problems that might occur during recuperation.  They also discussed reasonable alternatives to the procedure, including risks, benefits, and side effects related to the alternatives and risks related to not receiving this procedure.  I have had all my questions answered and I acknowledge that no guarantee has been made as to the result that may be obtained.    4.   Should the need arise during my operation/procedure, which includes change of level of care prior to discharge, I also consent to the administration of blood and/or blood products.  Further, I understand that despite careful testing and screening of blood or blood products by collecting agencies, I may still be subject to ill effects as a result of receiving a blood transfusion and/or blood products.  The following are some, but not all, of the potential risks that can occur: fever and allergic reactions, hemolytic reactions, transmission of diseases such as Hepatitis, AIDS  and Cytomegalovirus (CMV) and fluid overload.  In the event that I wish to have an autologous transfusion of my own blood, or a directed donor transfusion, I will discuss this with my physician.   Check only if Refusing Blood or Blood Products  I understand refusal of blood or blood products as deemed necessary by my physician may have serious consequences to my condition to include possible death. I hereby assume responsibility for my refusal and release the hospital, its personnel, and my physicians from any responsibility for the consequences of my refusal.         o  Refuse         5.   I authorize the use of any specimen, organs, tissues, body parts or foreign objects that may be removed from my body during the operation/procedure for diagnosis, research or teaching purposes and their subsequent disposal by hospital authorities.  I also authorize the release of specimen test results and/or written reports to my treating physician on the hospital medical staff or other referring or consulting physicians involved in my care, at the discretion of the Pathologist or my treating physician.    6.   I consent to the photographing or videotaping of the operations or procedures to be performed, including appropriate portions of my body for medical, scientific, or educational purposes, provided my identity is not revealed by the pictures or by descriptive texts accompanying them.  If the procedure has been photographed/videotaped, the surgeon will obtain the original picture, image, videotape or CD.  The hospital will not be responsible for storage, release or maintenance of the picture, image, tape or CD.    7.   I consent to the presence of a  or observers in the operating room as deemed necessary by my physician or their designees.    8.   I recognize that in the event my procedure results in extended X-Ray/fluoroscopy time, I may develop a skin reaction.    9. If I have a Do Not Attempt  Resuscitation (DNAR) order in place, that status will be suspended while in the operating room, procedural suite, and during the recovery period unless otherwise explicitly stated by me (or a person authorized to consent on my behalf). The surgeon or my attending physician will determine when the applicable recovery period ends for purposes of reinstating the DNAR order.  10. Patients having a sterilization procedure: I understand that if the procedure is successful the results will be permanent and it will therefore be impossible for me to inseminate, conceive, or bear children.  I also understand that the procedure is intended to result in sterility, although the result has not been guaranteed.   11. I acknowledge that my physician has explained sedation/analgesia administration to me including the risk and benefits I consent to the administration of sedation/analgesia as may be necessary or desirable in the judgment of my physician.    I CERTIFY THAT I HAVE READ AND FULLY UNDERSTAND THE ABOVE CONSENT TO OPERATION and/or OTHER PROCEDURE.        ____________________________________       _________________________________      ______________________________  Signature of Patient         Signature of Responsible Person        Printed Name of Responsible Person        ____________________________________      _________________________________      ______________________________       Signature of Witness          Relationship to Patient                       Date                                       Time  Patient Name: Annmarie Levin  : 1945    Reviewed: 2024   Printed: 2024  Medical Record #: [id number] Page 1 of 2             STATEMENT OF PHYSICIAN My signature below affirms that prior to the time of the procedure; I have explained to the patient and/or his/her legal representative, the risks and benefits involved in the proposed treatment and any reasonable alternative to the proposed  treatment. I have also explained the risks and benefits involved in refusal of the proposed treatment and alternatives to the proposed treatment and have answered the patient's questions. If I have a significant financial interest in a co-management agreement or a significant financial interest in any product or implant, or other significant relationship used in this procedure/surgery, I have disclosed this and had a discussion with my patient.     _______________________________________________________________ _____________________________  (Signature of Physician)                                                                                         (Date)                                   (Time)  Patient Name: Annmarie Levin  : 1945    Reviewed: 2024   Printed: 2024  Medical Record #: [id number] Page 2 of 2

## (undated) NOTE — LETTER
[place] [address]  Authorization for Invasive Procedures  Date: October 8, 2024         Time: 1002    I hereby authorize Dr. Aaron Geller , my physician and his/her assistants (if applicable), which may include medical students, residents, and/or fellows, to perform the following surgical operation/ procedure and administer such anesthesia as may be determined necessary by my physician: Transesophageal Echocardiogram with Possible Cardioversion  on Annmarie Levin  2.   I recognize that during the surgical operation/procedure, unforeseen conditions may necessitate additional or different procedures than those listed above.  I, therefore, further authorize and request that the above-named surgeon, assistants, or designees perform such procedures as are, in their judgment, necessary and desirable.    3.   My surgeon/physician has discussed prior to my surgery the potential benefits, risks and side effects of this procedure; the likelihood of achieving goals; and potential problems that might occur during recuperation.  They also discussed reasonable alternatives to the procedure, including risks, benefits, and side effects related to the alternatives and risks related to not receiving this procedure.  I have had all my questions answered and I acknowledge that no guarantee has been made as to the result that may be obtained.    4.   Should the need arise during my operation/procedure, which includes change of level of care prior to discharge, I also consent to the administration of blood and/or blood products.  Further, I understand that despite careful testing and screening of blood or blood products by collecting agencies, I may still be subject to ill effects as a result of receiving a blood transfusion and/or blood products.  The following are some, but not all, of the potential risks that can occur: fever and allergic reactions, hemolytic reactions, transmission of diseases such  as Hepatitis, AIDS and Cytomegalovirus (CMV) and fluid overload.  In the event that I wish to have an autologous transfusion of my own blood, or a directed donor transfusion, I will discuss this with my physician.   Check only if Refusing Blood or Blood Products  I understand refusal of blood or blood products as deemed necessary by my physician may have serious consequences to my condition to include possible death. I hereby assume responsibility for my refusal and release the hospital, its personnel, and my physicians from any responsibility for the consequences of my refusal.         o  Refuse         5.   I authorize the use of any specimen, organs, tissues, body parts or foreign objects that may be removed from my body during the operation/procedure for diagnosis, research or teaching purposes and their subsequent disposal by hospital authorities.  I also authorize the release of specimen test results and/or written reports to my treating physician on the hospital medical staff or other referring or consulting physicians involved in my care, at the discretion of the Pathologist or my treating physician.    6.   I consent to the photographing or videotaping of the operations or procedures to be performed, including appropriate portions of my body for medical, scientific, or educational purposes, provided my identity is not revealed by the pictures or by descriptive texts accompanying them.  If the procedure has been photographed/videotaped, the surgeon will obtain the original picture, image, videotape or CD.  The hospital will not be responsible for storage, release or maintenance of the picture, image, tape or CD.    7.   I consent to the presence of a  or observers in the operating room as deemed necessary by my physician or their designees.    8.   I recognize that in the event my procedure results in extended X-Ray/fluoroscopy time, I may develop a skin reaction.    9. If I have a Do Not  Attempt Resuscitation (DNAR) order in place, that status will be suspended while in the operating room, procedural suite, and during the recovery period unless otherwise explicitly stated by me (or a person authorized to consent on my behalf). The surgeon or my attending physician will determine when the applicable recovery period ends for purposes of reinstating the DNAR order.  10. Patients having a sterilization procedure: I understand that if the procedure is successful the results will be permanent and it will therefore be impossible for me to inseminate, conceive, or bear children.  I also understand that the procedure is intended to result in sterility, although the result has not been guaranteed.   11. I acknowledge that my physician has explained sedation/analgesia administration to me including the risk and benefits I consent to the administration of sedation/analgesia as may be necessary or desirable in the judgment of my physician.    I CERTIFY THAT I HAVE READ AND FULLY UNDERSTAND THE ABOVE CONSENT TO OPERATION and/or OTHER PROCEDURE.        ____________________________________       _________________________________      ______________________________  Signature of Patient         Signature of Responsible Person        Printed Name of Responsible Person        ____________________________________      _________________________________      ______________________________       Signature of Witness          Relationship to Patient                       Date                                       Time  Patient Name: Annmarie Levin  : 1945    Reviewed: 2024   Printed: 2024  Medical Record #: [id number] Page 1 of 2             STATEMENT OF PHYSICIAN My signature below affirms that prior to the time of the procedure; I have explained to the patient and/or his/her legal representative, the risks and benefits involved in the proposed treatment and any reasonable alternative to the  proposed treatment. I have also explained the risks and benefits involved in refusal of the proposed treatment and alternatives to the proposed treatment and have answered the patient's questions. If I have a significant financial interest in a co-management agreement or a significant financial interest in any product or implant, or other significant relationship used in this procedure/surgery, I have disclosed this and had a discussion with my patient.     _______________________________________________________________ _____________________________  (Signature of Physician)                                                                                         (Date)                                   (Time)  Patient Name: Annmarie Levin  : 1945    Reviewed: 2024   Printed: 2024  Medical Record #: [id number] Page 2 of 2

## (undated) NOTE — LETTER
[place] [address]  Authorization for Invasive Procedures  Date: 10/06/2024           Time: 1721    I hereby authorize ***, my physician and his/her assistants (if applicable), which may include medical students, residents, and/or fellows, to perform the following surgical operation/ procedure and administer such anesthesia as may be determined necessary by my physician: *** on Annmarie Ollie  2.   I recognize that during the surgical operation/procedure, unforeseen conditions may necessitate additional or different procedures than those listed above.  I, therefore, further authorize and request that the above-named surgeon, assistants, or designees perform such procedures as are, in their judgment, necessary and desirable.    3.   My surgeon/physician has discussed prior to my surgery the potential benefits, risks and side effects of this procedure; the likelihood of achieving goals; and potential problems that might occur during recuperation.  They also discussed reasonable alternatives to the procedure, including risks, benefits, and side effects related to the alternatives and risks related to not receiving this procedure.  I have had all my questions answered and I acknowledge that no guarantee has been made as to the result that may be obtained.    4.   Should the need arise during my operation/procedure, which includes change of level of care prior to discharge, I also consent to the administration of blood and/or blood products.  Further, I understand that despite careful testing and screening of blood or blood products by collecting agencies, I may still be subject to ill effects as a result of receiving a blood transfusion and/or blood products.  The following are some, but not all, of the potential risks that can occur: fever and allergic reactions, hemolytic reactions, transmission of diseases such as Hepatitis, AIDS and Cytomegalovirus (CMV) and fluid overload.  In the  event that I wish to have an autologous transfusion of my own blood, or a directed donor transfusion, I will discuss this with my physician.   Check only if Refusing Blood or Blood Products  I understand refusal of blood or blood products as deemed necessary by my physician may have serious consequences to my condition to include possible death. I hereby assume responsibility for my refusal and release the hospital, its personnel, and my physicians from any responsibility for the consequences of my refusal.         o  Refuse         5.   I authorize the use of any specimen, organs, tissues, body parts or foreign objects that may be removed from my body during the operation/procedure for diagnosis, research or teaching purposes and their subsequent disposal by hospital authorities.  I also authorize the release of specimen test results and/or written reports to my treating physician on the hospital medical staff or other referring or consulting physicians involved in my care, at the discretion of the Pathologist or my treating physician.    6.   I consent to the photographing or videotaping of the operations or procedures to be performed, including appropriate portions of my body for medical, scientific, or educational purposes, provided my identity is not revealed by the pictures or by descriptive texts accompanying them.  If the procedure has been photographed/videotaped, the surgeon will obtain the original picture, image, videotape or CD.  The hospital will not be responsible for storage, release or maintenance of the picture, image, tape or CD.    7.   I consent to the presence of a  or observers in the operating room as deemed necessary by my physician or their designees.    8.   I recognize that in the event my procedure results in extended X-Ray/fluoroscopy time, I may develop a skin reaction.    9. If I have a Do Not Attempt Resuscitation (DNAR) order in place, that status will be suspended  while in the operating room, procedural suite, and during the recovery period unless otherwise explicitly stated by me (or a person authorized to consent on my behalf). The surgeon or my attending physician will determine when the applicable recovery period ends for purposes of reinstating the DNAR order.  10. Patients having a sterilization procedure: I understand that if the procedure is successful the results will be permanent and it will therefore be impossible for me to inseminate, conceive, or bear children.  I also understand that the procedure is intended to result in sterility, although the result has not been guaranteed.   11. I acknowledge that my physician has explained sedation/analgesia administration to me including the risk and benefits I consent to the administration of sedation/analgesia as may be necessary or desirable in the judgment of my physician.    I CERTIFY THAT I HAVE READ AND FULLY UNDERSTAND THE ABOVE CONSENT TO OPERATION and/or OTHER PROCEDURE.        ____________________________________       _________________________________      ______________________________  Signature of Patient         Signature of Responsible Person        Printed Name of Responsible Person        ____________________________________      _________________________________      ______________________________       Signature of Witness          Relationship to Patient                       Date                                       Time  Patient Name: Annmarie Levin  : 1945    Reviewed: 2024   Printed: 2024  Medical Record #: [id number] Page 1 of 2             STATEMENT OF PHYSICIAN My signature below affirms that prior to the time of the procedure; I have explained to the patient and/or his/her legal representative, the risks and benefits involved in the proposed treatment and any reasonable alternative to the proposed treatment. I have also explained the risks and benefits involved in  refusal of the proposed treatment and alternatives to the proposed treatment and have answered the patient's questions. If I have a significant financial interest in a co-management agreement or a significant financial interest in any product or implant, or other significant relationship used in this procedure/surgery, I have disclosed this and had a discussion with my patient.     _______________________________________________________________ _____________________________  (Signature of Physician)                                                                                         (Date)                                   (Time)  Patient Name: Annmarie Levin  : 1945    Reviewed: 2024   Printed: 2024  Medical Record #: [id number] Page 2 of 2

## (undated) NOTE — Clinical Note
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 I've recommended myrbetriq & vag estrogen. I will work to manage her sx. I appreciate the opportunity to participate in her care.  Thanks, Joon Fiore

## (undated) NOTE — LETTER
[facility]  [address] [facility] [address]  [phone]    Blood Transfusion Consent    In the course of your treatment, it may become necessary to administer a transfusion of blood or blood components. This form provides basic information concerning this procedure and, if signed by you, authorizes its administration. By signing this form, you agree that all of your questions about the administration of blood or blood products have been answered by the ordering medical professional or designee.    Description of Procedure  Blood is introduced into one of your veins, commonly in the arm, using a sterilized disposable needle. The amount of blood transfused, and whether the transfusion will be of blood or blood components is a judgement the physician will make based on your particular needs.    Risks  The transfusion is a common procedure of low risk.  MINOR AND TEMPORARY REACTIONS ARE NOT UNCOMMON, including a slight bruise, swelling or local reaction in the area where the needle pierces your skin, or a nonserious reaction to the transfused material itself, including headache, fever or mild skin reaction, such as rash.  Serious reactions are possible, though very unlikely, and include severe allergic reaction (shock) and destruction (hemolysis) of transfused blood cells.  Infectious diseases which are known to be transmitted by blood transfusion include certain types of viral Hepatitis(liver infection from a virus), Human Immunodeficiency Virus (HIV-1,2) infection, a viral infection known to cause Acquired Immunodeficiency Syndrome (AIDS), as well as certain other bacterial, viral, and parasitic diseases. While a minimal risk of acquiring an infectious disease from transfused blood exists, in accordance with the Federal and State law, all due care has been taken in donor selection and testing to avoid transmission of disease.    Alternatives  If loss of blood poses serious threats during your  treatment, THERE IS NO EFFECTIVE ALTERNATIVE TO BLOOD TRANSFUSION. However, if you have any further questions on this matter, your provider will fully explain the alternatives to you if it has not already been done.    I, ______________________________, have read/had read to me the above. I understand the matters bearing on the decision whether or not to authorize a transfusion of blood or blood components. I have no questions which have not been answered to my full satisfaction. I hereby consent to such transfusion as my physician may deem necessary or advisable in the course of my treatment.    ______________________________________________                    ___________________________  (Signature of Patient or Responsible party in case of minor,                 (Printed Name of Patient or incompetent, or unconscious patient)              Responsible Party)    ___________________________               _____________________  (Relationship to Patient if not self)                                    (Date and Time)    __________________________                                                           ______________________              (Signature of Witness)               (Printed Name of Witness)     Language line ()    Telephone/Verbal/Video Consent    __________________________                     ____________________  (Signature of 2nd Witness           (Printed Name of 2nd  Telephone/Verbal/Video Consent)           Witness)    Patient Name: Annmarie Levin     : 1945                 Printed: 2024     Medical Record #: [id number]      Rev: 2023

## (undated) NOTE — LETTER
Hospital Discharge Documentation  Please phone to schedule a hospital follow up appointment.    From: [facility] Hospitalist's Office  Phone: [phone]    Patient discharged time/date: 8/15/2024 12:08 PM  Patient discharge disposition:  Home Health Care Services, home with [facility]       Discharge Summary - D/C Summary        Discharge Summary signed by Chay Adams MD at 8/15/2024  8:54 PM  Version 1 of 1      Author: Chay Adams MD Service: Internal Medicine Author Type: Physician    Filed: 8/15/2024  8:54 PM Date of Service: 8/15/2024 10:28 AM Status: Signed    : Chay Adams MD (Physician)         [facility]  part of [facility]    Discharge Summary    Annmarie Levin Patient Status:  Inpatient    1945 MRN [id number]   Location [facility] Attending Chay Adams MD   Hosp Day # 10 PCP LLOYD ANDREWS MD     Date of Admission: 2024     Date of Discharge: 08/15/24      Lace+ Score: 69  59-90 High Risk  29-58 Medium Risk  0-28   Low Risk.    TCM Follow-Up Recommendation:  LACE > 58: High Risk of readmission after discharge from the hospital.    DISCHARGE DX: Principal Problem:    Pneumothorax, unspecified type       The patient was seen and examined on day of discharge and this discharge summary is in conjunction with any daily progress note from day of discharge.    HPI per admitting physician: \"Annmarie Levin is a(n) 78 year old female, history significant for COPD and breast cancer s/p/mastectomy/radiation presents with a complaint of acute onset chest pain and shortness of breath earlier in the day.  Claims of late she has been coughing more than usual attributed to the weather, pain somewhat improved however started to become more short of breath, started using her O2 despite that there were times when she was desaturating into the upper 70s prompting her to go to urgent care for evaluation.  She was advised to  come to the ER.  X-rays done in ER indicated presence of a right pneumothorax.  Currently pain increase status post chest tube placement\"    Hospital Course:     Spontaneous pneumothorax  Nonhealing  -Chest x-ray 8/8 reviewed.  Noted enlarging of right upper lobe pneumothorax.  Also described malposition of chest tube.    -Status post IR replacement of chest tube on 8/8.  -Chest x-ray 8/9 reviewed.  Noted an enlarging right upper and new moderate size right basilar pneumothorax.  Also describing extensive emphysema in the subcutaneous tissues.  -Chest x-ray 810 reviewed.  Noted no significant change.  - Status post removal and replacement of chest tube,  third chest tube.  -Continue supplemental O2, wean as able  -Pain control as needed  -Pulmonology on consult  -CV surgery consulted.    -S/p Right VATS with bleb resection and talc pleurodesis procedure on 8/12.    -CT removed per CV surgery. Cleared for DC. Follow up as outpt     New onset atrial fibrillation  With episode of RVR overnight  -Initially started on diltiazem gtt., weaned off   -echocardiogram reviewed  -Continue lopressor  -Converted to SR  -Cardiology consulted, rec no need for AC. MCT on dc and follow up as outpt.       COPD with possible exacerbation  -Completed course of steroids.  -Continue home inhalers.  -Pulmonology on consult     Obesity with obstructive sleep apnea  -BMI 34.    -CPAP deferred due to PTX     Chronic respiratory failure  -Patient on intermittent supplemental O2 at home  -Mostly with exertion  -Continue supplemental O2        Physical Exam:    Vitals:    08/15/24 0013 08/15/24 0559 08/15/24 0815 08/15/24 0817   BP: 126/80 128/55     BP Location: Left arm Left arm     Pulse: 84 87 70    Resp: 20 20 18    Temp: 98 °F (36.7 °C) 97.8 °F (36.6 °C) 98 °F (36.7 °C)    TempSrc: Oral Oral Oral    SpO2: 96% 92% 95% 93%   Weight:       Height:         Patient Weight for the past 72 hrs:   Weight   08/13/24 0630 184 lb 11.9 oz (83.8 kg)        Intake/Output Summary (Last 24 hours) at 8/15/2024 1028  Last data filed at 8/15/2024 0741  Gross per 24 hour   Intake --   Output 790 ml   Net -790 ml       GENERAL:  Awake and alert, in no acute distress.  HEART:  Regular rhythm, regular rate  LUNGS:  Air entry was minimally decreased, worse over the right.  No increased work of breathing or wheezes   ABDOMEN: Soft and non-tender.    PSYCHIATRIC: Normal mood    CULTURE:   No results found for this visit on 08/04/24.    IMAGING STUDIES: SOME MAY NEED FOLLOW UP WITH PCP   XR CHEST AP PORTABLE  (CPT=71045)    Result Date: 8/15/2024  CONCLUSION:   Mild improvement in the small right pneumothorax.  The right chest tube has been removed.  Multiple additional findings are not significantly changed.     Dictated by (CST): Paco Mathur MD on 8/15/2024 at 8:01 AM     Finalized by (CST): Paco Mathur MD on 8/15/2024 at 8:07 AM          XR CHEST AP PORTABLE  (CPT=71045)    Result Date: 8/14/2024  CONCLUSION:  1. Cardiomegaly tortuous thoracic aorta. 2. Chronic appearing pulmonary interstitial fibrosis.  Chronic right apical pleural parenchymal abnormality with traction bronchiectasis.  Persistent right apical pneumothorax measuring 10-15% with slight progression measures 27 mm.  Partial atelectatic changes in the right apex 3. Chronic bronchopleural fistula cannot be excluded.    Dictated by (CST): Earl Barber MD on 8/14/2024 at 8:16 AM     Finalized by (CST): Earl Barber MD on 8/14/2024 at 8:20 AM          XR CHEST AP PORTABLE  (CPT=71045)    Result Date: 8/13/2024  CONCLUSION:  1. Cardiomegaly.  Tortuous thoracic aorta. 2. Emphysematous changes with chronic pulmonary interstitium. 3. Persistent small right apical pneumothorax measuring 10-15% similar prior exam.  Pigtail catheter drain has been replaced with the chest tube in the upper right hemithorax.  Chronic right apical pleural parenchymal scarring with chronic right apical traction  bronchiectasis.  Chronic bronchopleural fistula cannot be excluded.    Dictated by (CST): Earl Barber MD on 8/13/2024 at 12:16 PM     Finalized by (CST): Earl Barber MD on 8/13/2024 at 12:23 PM          XR CHEST AP PORTABLE  (CPT=71045)    Result Date: 8/12/2024  CONCLUSION:   The right apical pneumothorax is slightly decreased in size compared to the prior exam.  No new abnormality.    Dictated by (CST): Wagner Matthews MD on 8/12/2024 at 7:49 AM     Finalized by (CST): Wagner Matthews MD on 8/12/2024 at 7:52 AM          XR CHEST AP PORTABLE  (CPT=71045)    Result Date: 8/11/2024  CONCLUSION:  1. Persistent right-sided apical pneumothorax; a right-sided pigtail chest tube remains in place.  2. Extensive subcutaneous emphysema throughout the chest wall, right worse than left.    Dictated by (CST): Nicho Lynch MD on 8/11/2024 at 9:01 AM     Finalized by (CST): Nicho Lynch MD on 8/11/2024 at 9:05 AM          XR CHEST AP PORTABLE  (CPT=71045)    Result Date: 8/10/2024  CONCLUSION:  1. Pigtail right chest tube projects at the right perihilar region.  Stable moderate approximate 2.9 cm thickness right apical pneumothorax.  Stable extensive subcutaneous emphysema throughout the right greater than left chest wall and lower neck. 2. Lesser incidental findings as above.   A preliminary report was issued by the [facility] teleradiology service. There are no major discrepancies.  elm-remote  Dictated by (CST): Mario Pino MD on 8/10/2024 at 10:23 AM     Finalized by (CST): Mario Pino MD on 8/10/2024 at 10:25 AM          IR CHEST TUBE    Addendum Date: 8/9/2024    CORRECTION Corrected on: 8/09/2024;   PROCEDURE: IR CHEST TUBE EXCHANGE  INDICATIONS:  Spontaneous pneumothorax post chest tube insertion in the ED on 08/04/2024.  Subsequent dislodgement with subcutaneous emphysema, post removal and new chest tube insertion by IR on 08/08/2024.  Now with worsening pneumothorax.  Here for check and  exchange.  (S): Melissa  ANESTHESIA/SEDATION: Level of anesthesia/sedation: Moderate sedation (conscious sedation) Anesthesia/sedation administered by: Nurse or other independent trained observer who has no other duties with continuous monitoring of the patient's level of consciousness and physiologic status, under the personal supervision of the attending physician. Total intra-service sedation time: 10 min  FLUOROSCOPY TIME:  0.8 min AIR KERMA:  2.5 mGy  ESTIMATED BLOOD LOSS: Less than 5 mL  COMPLICATIONS: None  FINDINGS:  Informed consent was obtained.  The right chest and existing tube were sterilely prepped and draped.   fluoroscopy demonstrated a large right pneumothorax.  The existing chest tube was severely kinked and retracted to the chest wall.  Local lidocaine was administered.  The tube was cut.  Under fluoroscopic guidance, a wire was advanced through the tube into the right chest.  The tube was removed over the wire.  A new 12 Greek drainage catheter was advanced over the wire and formed in the apex of the chest.  There was immediate return of air from the catheter.  The catheter was secured to the skin with sutures and attached to a collection chamber.  CONCLUSION:  Existing right chest tube is kinked and retracted to chest wall.  Exchanged for new 12 Greek chest tube and repositioned into apex.  Maintain chest tube on water seal.  Daily chest x-rays.     Dictated by (CST): Wayne Torres MD on 8/09/2024 at 6:11 PM     Finalized by (CST): Wayne Torres MD on 8/09/2024 at 6:17 PM    Dictated by (CST): Wayne Torres MD on 8/09/2024 at 7:35 PM     Finalized by (CST): Wayne Torres MD on 8/09/2024 at 7:35 PM              Result Date: 8/9/2024  CONCLUSION:  Existing right chest tube is kinked and retracted to chest wall.  Exchanged for new 12 Greek chest tube and repositioned into apex.  Maintain chest tube on water seal.  Daily chest x-rays.     Dictated by (CST):  Wayne Torres MD on 8/09/2024 at 6:11 PM     Finalized by (CST): Wayne Torres MD on 8/09/2024 at 6:17 PM          XR CHEST AP PORTABLE  (CPT=71045)    Result Date: 8/9/2024  CONCLUSION:  1. Enlarging right upper and new moderate sized right basal pneumothorax as described above.  Repositioning of the right-sided pigtail chest tube now located in the right mid chest.  Extensive emphysema in the subcutaneous tissues of the right neck and right lateral chest wall has increased significantly since previous study.  Correlate clinically.  2. Results regarding the enlarging right-sided pneumothorax were called the nursing floor by technologist Camilla.    Dictated by (CST): Buck Jimenez MD on 8/09/2024 at 8:39 AM     Finalized by (CST): Buck Jimenez MD on 8/09/2024 at 8:43 AM          IR CHEST TUBE    Result Date: 8/9/2024  CONCLUSION:  1. Fluoroscopic guided placement of small bore chest tube for evacuation of recurrent right-sided pneumothorax.    Dictated by (CST): Robert Montoya MD on 8/09/2024 at 8:15 AM     Finalized by (CST): Robert Montoya MD on 8/09/2024 at 8:17 AM          CT CHEST(CONTRAST ONLY) (CPT=71260)    Result Date: 8/8/2024  CONCLUSION:   The pigtail catheter is malpositioned in the right chest wall subcutaneous tissues  Small right pneumothorax.  No effusion.  No midline shift  Mild peripheral fibrosis and traction bronchiectasis in the right lung, with confluent apical fibrosis that contains multiple small blebs, the likely source of the PTX  Thin walled complex probable cyst in the anterior mediastinum containing tiny amounts of layering milk of calcium, slowly growing since 2006. 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 Interval increase in a moderate-sized now 2.7 cm in height right apical pneumothorax.  The right pigtail chest tube has been pulled back in the sideholes are now outside of the pleural space in the right lower chest.  Correlate clinically as to intended location.  Slight interval increase in right-sided subcutaneous emphysema.  Follow-up study advised.    Dictated by (CST): Buck Jimenez MD on 8/08/2024 at 8:50 AM     Finalized by (CST): Buck Jimenez MD on 8/08/2024 at 8:54 AM          XR CHEST AP PORTABLE  (CPT=71045)    Result Date: 8/7/2024  CONCLUSION:  1. Cardiomegaly.  Tortuous thoracic aorta. 2. Diffuse chronic pulmonary interstitial prominence.  Chronic right apical pleural parenchymal abnormality with known traction bronchiectasis in the right apex. 3. Persistent small right apical pneumothorax measuring 17 mm with slight progression.  Percutaneous pigtail catheter drain in place.  Follow-up to resolution to exclude bronchopleural fistula.    Dictated by (CST): Earl Barber MD on 8/07/2024 at 10:15 AM     Finalized by (CST): Earl Barber MD on 8/07/2024 at 10:18 AM          XR CHEST AP PORTABLE  (CPT=71045)    Result Date: 8/6/2024  CONCLUSION:  1. Cardiomegaly.  Tortuous thoracic aorta. 2. Diffuse chronic pulmonary interstitial prominence.  Chronic right apical pleural parenchymal scarring with known traction bronchiectasis in the right apex.  Persistent small right apical pneumothorax measuring 14 mm.  Follow-up to resolution to exclude bronchopleural fistula. 3. Right apical percutaneous catheter remains in place.    Dictated by (CST): Earl Barber MD on 8/06/2024 at 9:42 AM     Finalized by (CST): Earl Barber MD on 8/06/2024 at 9:47 AM          XR CHEST AP PORTABLE  (CPT=71045)    Result Date: 8/5/2024  CONCLUSION:  1. Cardiomegaly.  Tortuous thoracic aorta.  Mediastinal configuration unchanged 2. Diffuse chronic pulmonary interstitial prominence  redemonstrated. 3. Small right apical pneumothorax measuring less than 10% with right apical percutaneous pigtail catheter in the pleural space.  Chronic right apical pleural parenchymal scarring with traction bronchiectasis noted on prior chest CT from 9/22/2023.  4. Recommend follow-up to resolution to exclude bronchopleural fistula.    Dictated by (CST): Earl Barber MD on 8/05/2024 at 11:38 AM     Finalized by (CST): Earl Barber MD on 8/05/2024 at 11:45 AM          XR CHEST AP PORTABLE  (CPT=71045)    Result Date: 8/5/2024  CONCLUSION:  1. Right-sided pigtail chest tube in place with re-expansion of right lung. 2. Chronically elevated right hemidiaphragm. 3. Right apical pleural parenchymal scarring. 4. Atherosclerotic calcification aorta. 5. Right mastectomy and axillary dissection.  1.   Dictated by (CST): Roland Wallace MD on 8/05/2024 at 11:39 AM     Finalized by (CST): Roland Wallace MD on 8/05/2024 at 11:43 AM          XR CHEST AP PORTABLE  (CPT=71045)    Result Date: 8/5/2024  CONCLUSION:  1. Large right pneumothorax.  No major discrepancy with preliminary [facility] report.  Dictated by (CST): Roland Wallace MD on 8/05/2024 at 10:41 AM     Finalized by (CST): Roland Wallace MD on 8/05/2024 at 10:43 AM           LABS :     Lab Results   Component Value Date    WBC 6.5 08/15/2024    HGB 10.9 (L) 08/15/2024    HCT 32.4 (L) 08/15/2024    .0 08/15/2024    CREATSERUM 0.67 08/15/2024    BUN 13 08/15/2024     08/15/2024    K 4.6 08/15/2024     08/15/2024    CO2 34.0 (H) 08/15/2024     (H) 08/15/2024    CA 8.9 08/15/2024    ALB 3.6 08/21/2023    ALKPHO 67 08/21/2023    BILT 0.5 08/21/2023    TP 6.7 08/21/2023    AST 21 08/21/2023    ALT 18 08/21/2023    TSH 2.796 08/14/2024    DDIMER 0.81 (H) 08/04/2024    MG 1.8 08/15/2024    PHOS 3.5 08/07/2024    B12 1,120 (H) 07/25/2022       Recent Labs   Lab 08/13/24  0450 08/14/24  0333 08/15/24  0540   RBC 3.18* 3.53* 3.17*    HGB 11.1* 11.9* 10.9*   HCT 32.4* 36.6 32.4*   .9* 103.7* 102.2*   MCH 34.9* 33.7 34.4*   MCHC 34.3 32.5 33.6   RDW 15.8* 15.9* 15.5*   NEPRELIM 6.82 7.01 4.28   WBC 8.9 9.8 6.5   .0 263.0 220.0     Recent Labs   Lab 08/13/24  0451 08/14/24  0333 08/15/24  0540   * 112* 112*   BUN 12 14 13   CREATSERUM 0.74 0.92 0.67   CA 8.8 9.1 8.9    138 139   K 4.4 5.0 4.6    102 104   CO2 32.0 34.0* 34.0*     No results found for: \"PT\", \"INR\"    Disposition: Discharge to Home    Condition at Discharge: Stable     Discharge Medications:      Discharge Medications        START taking these medications        Instructions Prescription details   metoprolol succinate ER 50 MG Tb24  Commonly known as: Toprol XL      Take 1 tablet (50 mg total) by mouth Daily Beta Blocker.   Quantity: 30 tablet  Refills: 1            CONTINUE taking these medications        Instructions Prescription details   albuterol 108 (90 Base) MCG/ACT Aers  Commonly known as: Ventolin HFA      Inhale 2 puffs into the lungs every 4 (four) hours as needed.   Refills: 0     aspirin 325 MG Tabs      Take  by mouth.   Refills: 0     Calcium Carbonate-Vitamin D 600-200 MG-UNIT Caps      Take  by mouth.   Refills: 0     cyanocobalamin 500 MCG Tabs  Commonly known as: Vitamin B12      Take 1 tablet (500 mcg total) by mouth daily.   Refills: 0     estradiol 0.1 MG/GM Crea  Commonly known as: Estrace      Apply 1/2 gram vaginally 2 times per week. Use at bedtime.   Quantity: 42.5 g  Refills: 3     fluticasone propionate 50 MCG/ACT Susp  Commonly known as: Flonase      2 sprays by Nasal route daily.   Refills: 0     Gemtesa 75 MG Tabs  Generic drug: Vibegron      Take 1 tablet by mouth daily.   Refills: 0     LUVENA PREBIOTIC LUBRICANT VA      Place  vaginally. Indications: One every four days, per pt's medication list   Refills: 0     montelukast 10 MG Tabs  Commonly known as: Singulair      Take by mouth.   Refills: 0     Multi For Her  50+ Tabs      Take  by mouth daily.   Refills: 0     multivitamin Tabs      Take 1 tablet by mouth daily with breakfast.   Refills: 0     Omeprazole 40 MG Cpdr      Take 1 capsule (40 mg total) by mouth daily.   Refills: 0     PATIENT SUPPLIED MEDICATION      2L of O2 as needed   Refills: 0     Symbicort 160-4.5 MCG/ACT Aero  Generic drug: Budesonide-Formoterol Fumarate      Inhale 2 puffs into the lungs 2 (two) times daily.   Refills: 0     tiotropium 18 MCG Caps  Commonly known as: Spiriva Handihaler      Inhale into the lungs daily.   Refills: 0     Vitamin D 50 MCG (2000 UT) Caps      Take 1 capsule (2,000 Units total) by mouth daily.   Refills: 0            STOP taking these medications      azithromycin 250 MG Tabs  Commonly known as: Zithromax        Myrbetriq 8 MG/ML Srer  Generic drug: Mirabegron ER                  Where to Get Your Medications        These medications were sent to [facility]/pharmacy #[id number] - [place], [place] - [address] [place] [address] AT across from Sravan Drummond, [phone], [phone]  [address] [place] [id number]      Phone: [phone]   metoprolol succinate ER 50 MG Tb24         Follow up Visits  Gatito Hatch MD  [address] [facility]  [address]  [facility] [id number] [phone]    Follow up in 1 month(s)  Office will call to schedule    LLOYD ANDREWS MD    Consultants         Provider   Role Specialty     Rancho Sutton MD      Consulting Physician Interventional, Cardiology     Adwoa Shanks, Brian CASTELLON MD      Consulting Physician SURGERY, GENERAL     Erasmo Balderrama MD      Consulting Physician PULMONARY DISEASES              Other Discharge Instructions:       Discharge Instructions         Sometimes managing your health at home requires assistance.  The [facility] team has recognized your preference to use [facility].  They can be reached by phone at [phone].  The fax number for your reference is [phone]..  A representative from the home health agency  will contact you or your family to schedule your first visit.       Thoracic Surgery Post-Operative Appointment     Follow up appointment scheduled: with Dr. Orona on August 22nd at 2:15pm located at the [facility].  Thank you.      Thoracic Surgery Discharge Instructions     Pain Management  You will be sent home with a prescription for pain medicine.  This will likely be a narcotic pain medicine such as Oxycodone or Norco (hydrocodone/acetaminophen).  If no contraindications, start with extra strength acetaminophen (Tylenol) scheduled, and use narcotics for breakthrough pain. Ice packs can be helpful as well for surface level, skin incision pain.      - Take extra strength acetaminophen (Tylenol) 500 mg every 4 to 6 hours, as long as you have no known liver conditions.   - **Max dose for acetaminophen (Tylenol) is 4000 mg per day. If taking Norco, please note that each tab contains 325 mg of acetaminophen (Tylenol).  - Do NOT take any NSAIDS such as Ibuprofen (Advil).     Restrictions  Upon discharge home, do not get in an airplane or soak in a tub/pool until after your first follow up to see me in the office. You may shower, washing incisions gently with soap and water.    No dietary restrictions. If taking prescription pain medications you may become constipated. Fluids, fiber and over the counter stool softeners are helpful for this.    Other than that, no activity restrictions. You should attempt to be as active as possible.  What ever you feel up to doing, you can do. Walking is strongly encouraged. You may drive (not within 4 hours of taking prescription pain medications).     Exercises  Do range of motion exercises twice a day with the arm on the side of your operation. The easiest is to \"walk\" your hand up the wall with your fingers. Eventually you should be able to get your arm straight up over your head.    You will be sent home with your breathing \"toys\" -- like your incentive spirometer.  Use this frequently at home. 10 times per hour while awake, if possible. If watching TV, use it at every commercial break.    Follow-up Appointment  My office will reach out to you regarding a follow up appointment, if not already scheduled. Appointment will be approximately 1-2 weeks after discharge.     If you are experiencing any of these symptoms, please call our office at [phone]. Office hours are Monday through Friday, 8 am to 4:30 pm.  If you are calling the office after hours, please call the Thoracic Surgery On-Call number [phone], and state that you are a patient from [facility].      - Persistent fevers of 101.5 or greater  - Worsening pain  - Worsening shortness of breath  - Signs of infection in your wound such as drainage, worsening of redness, swelling or     pain.  - Anything else that concerns you.            ----------------------------------------------------  38 MIN SPENT ON THIS DC   Chay Adams MD    8/15/2024      Electronically signed by Chay Adams MD on 8/15/2024  8:54 PM

## (undated) NOTE — Clinical Note
I saw your patient, Ms. Mehnaz Smalls. Please see attached note for my assessment and plans moving forward. Thank you for involving her care.   Please feel free to call me with any questions at [place] [id number] [facility]